# Patient Record
Sex: FEMALE | Race: WHITE | NOT HISPANIC OR LATINO | Employment: OTHER | URBAN - METROPOLITAN AREA
[De-identification: names, ages, dates, MRNs, and addresses within clinical notes are randomized per-mention and may not be internally consistent; named-entity substitution may affect disease eponyms.]

---

## 2017-02-09 ENCOUNTER — TELEPHONE (OUTPATIENT)
Dept: FAMILY MEDICINE CLINIC | Facility: CLINIC | Age: 77
End: 2017-02-09

## 2017-02-09 NOTE — TELEPHONE ENCOUNTER
Told the patient her vitamin D was 23 she needs to be on vitamin D told to go to the drugstore and  some vitamin D 2000 units take 1 a day repeat this vitamin D and about 6 months

## 2017-02-16 ENCOUNTER — TELEPHONE (OUTPATIENT)
Dept: FAMILY MEDICINE CLINIC | Facility: CLINIC | Age: 77
End: 2017-02-16

## 2017-02-16 NOTE — TELEPHONE ENCOUNTER
Selina,     Please call patient she states it's regarding something & did not say.    Contact #[phone].    Thank you.

## 2017-04-25 ENCOUNTER — OFFICE VISIT (OUTPATIENT)
Dept: INTERNAL MEDICINE | Facility: CLINIC | Age: 77
End: 2017-04-25

## 2017-04-25 VITALS
HEIGHT: 63 IN | HEART RATE: 68 BPM | SYSTOLIC BLOOD PRESSURE: 142 MMHG | WEIGHT: 191 LBS | TEMPERATURE: 98.8 F | BODY MASS INDEX: 33.84 KG/M2 | RESPIRATION RATE: 16 BRPM | DIASTOLIC BLOOD PRESSURE: 90 MMHG

## 2017-04-25 DIAGNOSIS — M51.36 DEGENERATION OF INTERVERTEBRAL DISC OF LUMBAR REGION: ICD-10-CM

## 2017-04-25 DIAGNOSIS — L40.9 PSORIASIS OF SCALP: ICD-10-CM

## 2017-04-25 DIAGNOSIS — E55.9 VITAMIN D DEFICIENCY: ICD-10-CM

## 2017-04-25 DIAGNOSIS — R41.3 MEMORY LOSS: ICD-10-CM

## 2017-04-25 DIAGNOSIS — E78.2 MIXED HYPERLIPIDEMIA: Primary | ICD-10-CM

## 2017-04-25 DIAGNOSIS — I10 ESSENTIAL HYPERTENSION: ICD-10-CM

## 2017-04-25 PROCEDURE — 99214 OFFICE O/P EST MOD 30 MIN: CPT | Performed by: FAMILY MEDICINE

## 2017-04-25 RX ORDER — TRAZODONE HYDROCHLORIDE 50 MG/1
50 TABLET ORAL NIGHTLY
Qty: 90 TABLET | Refills: 3 | Status: SHIPPED | OUTPATIENT
Start: 2017-04-25 | End: 2017-07-06

## 2017-04-25 RX ORDER — CALCIUM CARBONATE 200(500)MG
2 TABLET,CHEWABLE ORAL DAILY
COMMUNITY
End: 2017-07-06 | Stop reason: SDUPTHER

## 2017-04-25 RX ORDER — ACETAMINOPHEN 160 MG
2000 TABLET,DISINTEGRATING ORAL
COMMUNITY
End: 2017-07-06

## 2017-04-25 NOTE — PROGRESS NOTES
Subjective   Vikki Robledo is a 76 y.o. female.     Chief Complaint   Patient presents with   • Pain   • Leg Pain   • Vitamin D Deficiency   • Hyperlipidemia   • Memory Loss         History of Present Illness   Patient's transfer patient Dr. Meléndez who is retired.  She has a history of vitamin D deficiency was pretty severe.  She has been going through the trial is taking care of her mother who is .  She is .  She doesn't have children.  Otherwise she has 2 cats.  She has had some scalp psoriasis and sees dermatology.  She's had a weight problem.  Reviewed history of left leg pain and she was thought to have had a side effect from Zetia and she got off that from Dr. Haines cardiology.    We discussed at length memory loss and multiple first-degree relatives with dementia Alzheimer's type.  She didn't sleep very well.      The following portions of the patient's history were reviewed and updated as appropriate: allergies, current medications, past social history and problem list.    Review of Systems   Constitutional: Positive for fatigue.   HENT: Negative.    Eyes: Negative.    Respiratory: Negative.    Cardiovascular: Negative.    Gastrointestinal: Negative.    Endocrine: Negative.    Genitourinary: Negative.    Musculoskeletal: Positive for back pain and myalgias.   Skin: Negative.    Allergic/Immunologic: Negative.    Neurological: Negative.    Hematological: Negative.    Psychiatric/Behavioral: Positive for sleep disturbance.       Objective   Vitals:    17 1417   BP: 142/90   Pulse: 68   Resp: 16   Temp: 98.8 °F (37.1 °C)     Physical Exam   Constitutional: She is oriented to person, place, and time. She appears well-developed and well-nourished.   HENT:   Head: Normocephalic and atraumatic.   Right Ear: Tympanic membrane and external ear normal.   Left Ear: Tympanic membrane and external ear normal.   Nose: Nose normal.   Mouth/Throat: Oropharynx is clear and moist.   Eyes:  Conjunctivae and EOM are normal. Pupils are equal, round, and reactive to light.   Neck: Normal range of motion. Neck supple. No JVD present. No thyromegaly present.   Cardiovascular: Normal rate, regular rhythm, normal heart sounds and intact distal pulses.    Pulmonary/Chest: Effort normal and breath sounds normal.   Abdominal: Soft. Bowel sounds are normal.   Musculoskeletal:        Lumbar back: She exhibits decreased range of motion.   Lymphadenopathy:     She has no cervical adenopathy.   Neurological: She is alert and oriented to person, place, and time. No cranial nerve deficit. Coordination normal.   Skin: Skin is warm and dry. No rash noted.   Psychiatric: She has a normal mood and affect. Her behavior is normal. Judgment and thought content normal.   Vitals reviewed.      Assessment/Plan   Problem List Items Addressed This Visit        Cardiovascular and Mediastinum    Hyperlipidemia - Primary    Relevant Orders    Comprehensive Metabolic Panel    CBC & Differential    Lipid Panel With / Chol / HDL Ratio    Sedimentation Rate    Vitamin D 25 Hydroxy    Vitamin B12    Hypertension    Relevant Orders    Comprehensive Metabolic Panel    CBC & Differential    Lipid Panel With / Chol / HDL Ratio    Sedimentation Rate    Vitamin D 25 Hydroxy    Vitamin B12       Musculoskeletal and Integument    Degeneration of intervertebral disc of lumbar region    Relevant Orders    Comprehensive Metabolic Panel    CBC & Differential    Lipid Panel With / Chol / HDL Ratio    Sedimentation Rate    Vitamin D 25 Hydroxy    Vitamin B12      Other Visit Diagnoses     Psoriasis of scalp        Relevant Orders    Comprehensive Metabolic Panel    CBC & Differential    Lipid Panel With / Chol / HDL Ratio    Sedimentation Rate    Vitamin D 25 Hydroxy    Vitamin B12    Vitamin D deficiency        Relevant Orders    Comprehensive Metabolic Panel    CBC & Differential    Lipid Panel With / Chol / HDL Ratio    Sedimentation Rate    Vitamin  D 25 Hydroxy    Vitamin B12    Memory loss        Relevant Orders    Vitamin B12      Plan: Screening labs recheck vitamin D level.  Trial of trazodone 50 mg at bedtime #30 refill ×2.  Recheck in about 6 weeks.

## 2017-04-26 ENCOUNTER — TELEPHONE (OUTPATIENT)
Dept: INTERNAL MEDICINE | Facility: CLINIC | Age: 77
End: 2017-04-26

## 2017-04-26 LAB
25(OH)D3+25(OH)D2 SERPL-MCNC: 28.8 NG/ML (ref 30–100)
ALBUMIN SERPL-MCNC: 4.2 G/DL (ref 3.5–5.2)
ALBUMIN/GLOB SERPL: 1.7 G/DL
ALP SERPL-CCNC: 63 U/L (ref 39–117)
ALT SERPL-CCNC: 16 U/L (ref 1–33)
AST SERPL-CCNC: 22 U/L (ref 1–32)
BASOPHILS # BLD AUTO: 0.03 10*3/MM3 (ref 0–0.2)
BASOPHILS NFR BLD AUTO: 0.5 % (ref 0–1.5)
BILIRUB SERPL-MCNC: 0.9 MG/DL (ref 0.1–1.2)
BUN SERPL-MCNC: 18 MG/DL (ref 8–23)
BUN/CREAT SERPL: 23.7 (ref 7–25)
CALCIUM SERPL-MCNC: 9.5 MG/DL (ref 8.6–10.5)
CHLORIDE SERPL-SCNC: 99 MMOL/L (ref 98–107)
CHOLEST SERPL-MCNC: 227 MG/DL (ref 0–200)
CHOLEST/HDLC SERPL: 4.37 {RATIO}
CO2 SERPL-SCNC: 32.4 MMOL/L (ref 22–29)
CREAT SERPL-MCNC: 0.76 MG/DL (ref 0.57–1)
EOSINOPHIL # BLD AUTO: 0.04 10*3/MM3 (ref 0–0.7)
EOSINOPHIL NFR BLD AUTO: 0.6 % (ref 0.3–6.2)
ERYTHROCYTE [DISTWIDTH] IN BLOOD BY AUTOMATED COUNT: 13.8 % (ref 11.7–13)
ERYTHROCYTE [SEDIMENTATION RATE] IN BLOOD BY WESTERGREN METHOD: 5 MM/HR (ref 0–30)
GLOBULIN SER CALC-MCNC: 2.5 GM/DL
GLUCOSE SERPL-MCNC: 92 MG/DL (ref 65–99)
HCT VFR BLD AUTO: 47 % (ref 35.6–45.5)
HDLC SERPL-MCNC: 52 MG/DL (ref 40–60)
HGB BLD-MCNC: 16.2 G/DL (ref 11.9–15.5)
IMM GRANULOCYTES # BLD: 0 10*3/MM3 (ref 0–0.03)
IMM GRANULOCYTES NFR BLD: 0 % (ref 0–0.5)
LDLC SERPL CALC-MCNC: 136 MG/DL (ref 0–100)
LYMPHOCYTES # BLD AUTO: 2.51 10*3/MM3 (ref 0.9–4.8)
LYMPHOCYTES NFR BLD AUTO: 38 % (ref 19.6–45.3)
MCH RBC QN AUTO: 30.7 PG (ref 26.9–32)
MCHC RBC AUTO-ENTMCNC: 34.5 G/DL (ref 32.4–36.3)
MCV RBC AUTO: 89 FL (ref 80.5–98.2)
MONOCYTES # BLD AUTO: 0.57 10*3/MM3 (ref 0.2–1.2)
MONOCYTES NFR BLD AUTO: 8.6 % (ref 5–12)
NEUTROPHILS # BLD AUTO: 3.46 10*3/MM3 (ref 1.9–8.1)
NEUTROPHILS NFR BLD AUTO: 52.3 % (ref 42.7–76)
PLATELET # BLD AUTO: 158 10*3/MM3 (ref 140–500)
POTASSIUM SERPL-SCNC: 4 MMOL/L (ref 3.5–5.2)
PROT SERPL-MCNC: 6.7 G/DL (ref 6–8.5)
RBC # BLD AUTO: 5.28 10*6/MM3 (ref 3.9–5.2)
SODIUM SERPL-SCNC: 143 MMOL/L (ref 136–145)
TRIGL SERPL-MCNC: 195 MG/DL (ref 0–150)
VLDLC SERPL CALC-MCNC: 39 MG/DL (ref 5–40)
WBC # BLD AUTO: 6.61 10*3/MM3 (ref 4.5–10.7)

## 2017-07-06 ENCOUNTER — OFFICE VISIT (OUTPATIENT)
Dept: INTERNAL MEDICINE | Facility: CLINIC | Age: 77
End: 2017-07-06

## 2017-07-06 VITALS
OXYGEN SATURATION: 98 % | SYSTOLIC BLOOD PRESSURE: 138 MMHG | WEIGHT: 181 LBS | BODY MASS INDEX: 32.07 KG/M2 | HEART RATE: 56 BPM | DIASTOLIC BLOOD PRESSURE: 84 MMHG | HEIGHT: 63 IN

## 2017-07-06 DIAGNOSIS — E55.9 VITAMIN D DEFICIENCY: ICD-10-CM

## 2017-07-06 DIAGNOSIS — I10 ESSENTIAL HYPERTENSION: ICD-10-CM

## 2017-07-06 DIAGNOSIS — E78.2 MIXED HYPERLIPIDEMIA: Primary | ICD-10-CM

## 2017-07-06 DIAGNOSIS — F51.02 TRANSIENT INSOMNIA: ICD-10-CM

## 2017-07-06 PROCEDURE — 99214 OFFICE O/P EST MOD 30 MIN: CPT | Performed by: FAMILY MEDICINE

## 2017-07-06 RX ORDER — METHENAMINE, SODIUM PHOSPHATE, MONOBASIC, MONOHYDRATE, PHENYL SALICYLATE, METHYLENE BLUE, AND HYOSCYAMINE SULFATE 120; 40.8; 36; 10; .12 MG/1; MG/1; MG/1; MG/1; MG/1
CAPSULE ORAL
Refills: 0 | COMMUNITY
Start: 2017-06-04 | End: 2018-07-10 | Stop reason: SDUPTHER

## 2017-07-06 RX ORDER — CALCIUM CARBONATE 200(500)MG
1 TABLET,CHEWABLE ORAL
COMMUNITY
End: 2018-07-10

## 2017-07-06 RX ORDER — VALSARTAN AND HYDROCHLOROTHIAZIDE 160; 25 MG/1; MG/1
1 TABLET ORAL DAILY
Qty: 90 TABLET | Refills: 3 | Status: SHIPPED | OUTPATIENT
Start: 2017-07-06 | End: 2018-07-14 | Stop reason: SDUPTHER

## 2017-07-06 RX ORDER — MULTIVITAMIN
TABLET ORAL
COMMUNITY
End: 2019-08-22

## 2017-07-06 NOTE — PROGRESS NOTES
Subjective   Vikki Robledo is a 77 y.o. female.     Chief Complaint   Patient presents with   • Follow-up   Patient is here for follow-up of history of hyperlipidemia as well as psoriasis on the scalp hypertension transient insomnia and concern about family history of Alzheimer's disease as well as vitamin D deficiency.  History of Present Illness   Patient is here and is done well as far as losing weight and is improvement in her cholesterol numbers from testing done at [facility].  Cholesterol is 163 HDL 39 Aren shows 209 she's lost 61 pounds since she was in her 60s.  Her mom had Alzheimer's disease history.    She is treated for chronic scalp psoriasis as well.  She sees Dr. Costello dermatology.    The patient takes Zetia now about 3 or 4 times a week.  Otherwise Dee Dee of hypertension is reviewed.      The following portions of the patient's history were reviewed and updated as appropriate: allergies, current medications, past social history and problem list.    Review of Systems   Constitutional: Negative.    HENT: Negative.    Eyes: Negative.    Respiratory: Negative.    Cardiovascular: Negative.    Gastrointestinal: Negative.    Endocrine: Negative.    Genitourinary: Negative.    Musculoskeletal: Negative.    Skin: Negative.    Allergic/Immunologic: Negative.    Neurological: Negative.    Hematological: Negative.    Psychiatric/Behavioral: Negative.        Objective   Vitals:    07/06/17 1522   BP: 138/84   Pulse: 56   SpO2: 98%     Physical Exam   Constitutional: She is oriented to person, place, and time. She appears well-developed and well-nourished.   HENT:   Head: Normocephalic and atraumatic.   Right Ear: Tympanic membrane and external ear normal.   Left Ear: Tympanic membrane and external ear normal.   Nose: Nose normal.   Mouth/Throat: Oropharynx is clear and moist.   Eyes: Conjunctivae and EOM are normal. Pupils are equal, round, and reactive to light.   Neck: Normal range  of motion. Neck supple. No JVD present. No thyromegaly present.   Cardiovascular: Normal rate, regular rhythm, normal heart sounds and intact distal pulses.    Pulmonary/Chest: Effort normal and breath sounds normal.   Abdominal: Soft. Bowel sounds are normal.   Musculoskeletal:        Lumbar back: She exhibits decreased range of motion.   Lymphadenopathy:     She has no cervical adenopathy.   Neurological: She is alert and oriented to person, place, and time. No cranial nerve deficit. Coordination normal.   Skin: Skin is warm and dry. No rash noted.   Psychiatric: She has a normal mood and affect. Her behavior is normal. Judgment and thought content normal.   Vitals reviewed.      Assessment/Plan   Problem List Items Addressed This Visit        Cardiovascular and Mediastinum    Hyperlipidemia - Primary    Hypertension       Other    Transient insomnia      Other Visit Diagnoses     Vitamin D deficiency          Plan: Return visit in about 4 months with recheck of labs at that time including vitamin D.  Otherwise continue current treatment of hypertension.  She has some trazodone 50 mg at bedtime when necessary which she has not taken that she wants to research the medicine further.  I will advise probiotic in reference to her immune system and psoriasis

## 2017-12-10 ENCOUNTER — APPOINTMENT (OUTPATIENT)
Dept: CT IMAGING | Facility: HOSPITAL | Age: 77
End: 2017-12-10

## 2017-12-10 ENCOUNTER — HOSPITAL ENCOUNTER (EMERGENCY)
Facility: HOSPITAL | Age: 77
Discharge: HOME | End: 2017-12-10
Attending: EMERGENCY MEDICINE | Admitting: EMERGENCY MEDICINE

## 2017-12-10 VITALS
HEIGHT: 63 IN | RESPIRATION RATE: 18 BRPM | OXYGEN SATURATION: 98 % | WEIGHT: 180 LBS | HEART RATE: 65 BPM | SYSTOLIC BLOOD PRESSURE: 177 MMHG | DIASTOLIC BLOOD PRESSURE: 71 MMHG | BODY MASS INDEX: 31.89 KG/M2 | TEMPERATURE: 98.2 F

## 2017-12-10 DIAGNOSIS — R42 DIZZINESS: Primary | ICD-10-CM

## 2017-12-10 LAB
ALBUMIN SERPL-MCNC: 3.6 G/DL (ref 3.5–5.2)
ALBUMIN/GLOB SERPL: 1.1 G/DL
ALP SERPL-CCNC: 62 U/L (ref 39–117)
ALT SERPL W P-5'-P-CCNC: 16 U/L (ref 1–33)
ANION GAP SERPL CALCULATED.3IONS-SCNC: 9 MMOL/L
AST SERPL-CCNC: 22 U/L (ref 1–32)
BASOPHILS # BLD AUTO: 0.03 10*3/MM3 (ref 0–0.2)
BASOPHILS NFR BLD AUTO: 0.6 % (ref 0–1.5)
BILIRUB SERPL-MCNC: 0.8 MG/DL (ref 0.1–1.2)
BUN BLD-MCNC: 12 MG/DL (ref 8–23)
BUN/CREAT SERPL: 15.6 (ref 7–25)
CALCIUM SPEC-SCNC: 9.1 MG/DL (ref 8.6–10.5)
CHLORIDE SERPL-SCNC: 104 MMOL/L (ref 98–107)
CO2 SERPL-SCNC: 31 MMOL/L (ref 22–29)
CREAT BLD-MCNC: 0.77 MG/DL (ref 0.57–1)
DEPRECATED RDW RBC AUTO: 43.3 FL (ref 37–54)
EOSINOPHIL # BLD AUTO: 0.02 10*3/MM3 (ref 0–0.7)
EOSINOPHIL NFR BLD AUTO: 0.4 % (ref 0.3–6.2)
ERYTHROCYTE [DISTWIDTH] IN BLOOD BY AUTOMATED COUNT: 13.1 % (ref 11.7–13)
GFR SERPL CREATININE-BSD FRML MDRD: 73 ML/MIN/1.73
GLOBULIN UR ELPH-MCNC: 3.2 GM/DL
GLUCOSE BLD-MCNC: 100 MG/DL (ref 65–99)
HCT VFR BLD AUTO: 46.8 % (ref 35.6–45.5)
HGB BLD-MCNC: 15.3 G/DL (ref 11.9–15.5)
HOLD SPECIMEN: NORMAL
IMM GRANULOCYTES # BLD: 0 10*3/MM3 (ref 0–0.03)
IMM GRANULOCYTES NFR BLD: 0 % (ref 0–0.5)
LYMPHOCYTES # BLD AUTO: 1.65 10*3/MM3 (ref 0.9–4.8)
LYMPHOCYTES NFR BLD AUTO: 30.4 % (ref 19.6–45.3)
MCH RBC QN AUTO: 29.8 PG (ref 26.9–32)
MCHC RBC AUTO-ENTMCNC: 32.7 G/DL (ref 32.4–36.3)
MCV RBC AUTO: 91.1 FL (ref 80.5–98.2)
MONOCYTES # BLD AUTO: 0.34 10*3/MM3 (ref 0.2–1.2)
MONOCYTES NFR BLD AUTO: 6.3 % (ref 5–12)
NEUTROPHILS # BLD AUTO: 3.38 10*3/MM3 (ref 1.9–8.1)
NEUTROPHILS NFR BLD AUTO: 62.3 % (ref 42.7–76)
PLATELET # BLD AUTO: 135 10*3/MM3 (ref 140–500)
PMV BLD AUTO: 11.3 FL (ref 6–12)
POTASSIUM BLD-SCNC: 3.8 MMOL/L (ref 3.5–5.2)
PROT SERPL-MCNC: 6.8 G/DL (ref 6–8.5)
RBC # BLD AUTO: 5.14 10*6/MM3 (ref 3.9–5.2)
SODIUM BLD-SCNC: 144 MMOL/L (ref 136–145)
WBC NRBC COR # BLD: 5.42 10*3/MM3 (ref 4.5–10.7)
WHOLE BLOOD HOLD SPECIMEN: NORMAL

## 2017-12-10 PROCEDURE — 93010 ELECTROCARDIOGRAM REPORT: CPT | Performed by: INTERNAL MEDICINE

## 2017-12-10 PROCEDURE — 85025 COMPLETE CBC W/AUTO DIFF WBC: CPT | Performed by: EMERGENCY MEDICINE

## 2017-12-10 PROCEDURE — 80053 COMPREHEN METABOLIC PANEL: CPT | Performed by: EMERGENCY MEDICINE

## 2017-12-10 PROCEDURE — 99284 EMERGENCY DEPT VISIT MOD MDM: CPT

## 2017-12-10 PROCEDURE — 70450 CT HEAD/BRAIN W/O DYE: CPT

## 2017-12-10 PROCEDURE — 93005 ELECTROCARDIOGRAM TRACING: CPT | Performed by: EMERGENCY MEDICINE

## 2017-12-10 RX ORDER — SODIUM CHLORIDE 0.9 % (FLUSH) 0.9 %
10 SYRINGE (ML) INJECTION AS NEEDED
Status: DISCONTINUED | OUTPATIENT
Start: 2017-12-10 | End: 2017-12-10 | Stop reason: HOSPADM

## 2017-12-10 NOTE — ED PROVIDER NOTES
EMERGENCY DEPARTMENT ENCOUNTER    CHIEF COMPLAINT  Chief Complaint: generalized weakness    History given by: Pt  History limited by: N/A  Room Number: 14/14  PMD: Jewel Arteaga Jr., MD      HPI:  Pt is a 77 y.o. female who presents via EMS for generalized weakness with an unsteady gait that began last night. She began to feel lightheaded, and experienced difficulty ambulating around the house yesterday night due to her unsteadiness. Pt woke up this morning and was still unsteady on her feet, and decided to come to the ED. No LOC, a headache, nausea, vomiting, diarrhea, urinary symptoms, palpitations, or any numbness or tingling. Pt states she called (PCP) this morning, who told her to come to the ED to get checked out.     Duration:  Since last night   Onset: gradual  Timing: constant  Intensity/Severity: moderate  Progression: unchanged   Associated Symptoms: lightheadedness   Aggravating Factors: none  Alleviating Factors: none  Previous Episodes: No history of a similar episode.   Treatment before arrival: No treatment PTA.     PAST MEDICAL HISTORY  Active Ambulatory Problems     Diagnosis Date Noted   • Degeneration of intervertebral disc of lumbar region 03/24/2016   • Hyperlipidemia 03/24/2016   • Hypertension 03/24/2016   • Stenosis of carotid artery 03/24/2016   • Pain in joint 09/23/2016   • Carotid stenosis 09/23/2016   • Muscle spasm 09/23/2016   • Transient insomnia 09/23/2016     Resolved Ambulatory Problems     Diagnosis Date Noted   • No Resolved Ambulatory Problems     Past Medical History:   Diagnosis Date   • History of breast surgery    • Hypertension        PAST SURGICAL HISTORY  Past Surgical History:   Procedure Laterality Date   • BREAST SURGERY      aspriaton of cyst x3   • CHOLECYSTECTOMY  2007   • HERNIA REPAIR      x2   • TONSILLECTOMY     • UTERINE FIBROID EMBOLIZATION  1970       FAMILY HISTORY  Family History   Problem Relation Age of Onset   • Alzheimer's disease Other    •  Pancreatic cancer Other        SOCIAL HISTORY  Social History     Social History   • Marital status:      Spouse name: N/A   • Number of children: N/A   • Years of education: N/A     Occupational History   • Retired/teacher       Social History Main Topics   • Smoking status: Former Smoker     Types: Cigarettes   • Smokeless tobacco: Not on file   • Alcohol use Yes      Comment: One or more times monthly    • Drug use: Not on file   • Sexual activity: Not on file     Other Topics Concern   • Not on file     Social History Narrative       ALLERGIES  Amoxicillin; Penicillins; and Statins    REVIEW OF SYSTEMS  Review of Systems   Constitutional: Negative for fever.   HENT: Negative for sore throat.    Eyes: Negative.    Respiratory: Negative for cough and shortness of breath.    Cardiovascular: Negative for chest pain.   Gastrointestinal: Negative for abdominal pain, diarrhea and vomiting.   Genitourinary: Negative for dysuria.   Musculoskeletal: Positive for gait problem. Negative for neck pain.   Skin: Negative for rash.   Allergic/Immunologic: Negative.    Neurological: Positive for weakness (generalized ) and light-headedness. Negative for numbness and headaches.   Hematological: Negative.    Psychiatric/Behavioral: Negative.    All other systems reviewed and are negative.      PHYSICAL EXAM  ED Triage Vitals   Temp Heart Rate Resp BP SpO2   12/10/17 1120 12/10/17 1120 12/10/17 1120 12/10/17 1120 12/10/17 1120   98.2 °F (36.8 °C) 60 16 132/67 98 %      Temp src Heart Rate Source Patient Position BP Location FiO2 (%)   12/10/17 1120 12/10/17 1120 12/10/17 1120 12/10/17 1120 --   Tympanic Monitor Lying Right arm        Physical Exam   Constitutional: She is oriented to person, place, and time and well-developed, well-nourished, and in no distress. No distress.   HENT:   Head: Normocephalic and atraumatic.   Eyes: EOM are normal. Pupils are equal, round, and reactive to light.   Neck: Normal range of motion.  Neck supple.   Cardiovascular: Normal rate, regular rhythm and normal heart sounds.    Pulmonary/Chest: Effort normal and breath sounds normal. No respiratory distress.   Abdominal: Soft. There is no tenderness. There is no rebound and no guarding.   Musculoskeletal: Normal range of motion. She exhibits no edema.   Neurological: She is alert and oriented to person, place, and time. She has normal sensation and normal strength.   Skin: Skin is warm and dry. No rash noted.   Psychiatric: Mood and affect normal.   Nursing note and vitals reviewed.      LAB RESULTS  Lab Results (last 24 hours)     Procedure Component Value Units Date/Time    CBC & Differential [[id number]] Collected:  12/10/17 1210    Specimen:  Blood Updated:  12/10/17 1226    Narrative:       The following orders were created for panel order CBC & Differential.  Procedure                               Abnormality         Status                     ---------                               -----------         ------                     CBC Auto Differential[[id number]]        Abnormal            Final result                 Please view results for these tests on the individual orders.    Comprehensive Metabolic Panel [[id number]]  (Abnormal) Collected:  12/10/17 1210    Specimen:  Blood Updated:  12/10/17 1248     Glucose 100 (H) mg/dL      BUN 12 mg/dL      Creatinine 0.77 mg/dL      Sodium 144 mmol/L      Potassium 3.8 mmol/L      Chloride 104 mmol/L      CO2 31.0 (H) mmol/L      Calcium 9.1 mg/dL      Total Protein 6.8 g/dL      Albumin 3.60 g/dL      ALT (SGPT) 16 U/L      AST (SGOT) 22 U/L      Alkaline Phosphatase 62 U/L      Total Bilirubin 0.8 mg/dL      eGFR Non African Amer 73 mL/min/1.73      Globulin 3.2 gm/dL      A/G Ratio 1.1 g/dL      BUN/Creatinine Ratio 15.6     Anion Gap 9.0 mmol/L     Narrative:       The MDRD GFR formula is only valid for adults with stable renal function between ages 18 and 70.    CBC Auto Differential [[id number]]   (Abnormal) Collected:  12/10/17 1210    Specimen:  Blood Updated:  12/10/17 1226     WBC 5.42 10*3/mm3      RBC 5.14 10*6/mm3      Hemoglobin 15.3 g/dL      Hematocrit 46.8 (H) %      MCV 91.1 fL      MCH 29.8 pg      MCHC 32.7 g/dL      RDW 13.1 (H) %      RDW-SD 43.3 fl      MPV 11.3 fL      Platelets 135 (L) 10*3/mm3      Neutrophil % 62.3 %      Lymphocyte % 30.4 %      Monocyte % 6.3 %      Eosinophil % 0.4 %      Basophil % 0.6 %      Immature Grans % 0.0 %      Neutrophils, Absolute 3.38 10*3/mm3      Lymphocytes, Absolute 1.65 10*3/mm3      Monocytes, Absolute 0.34 10*3/mm3      Eosinophils, Absolute 0.02 10*3/mm3      Basophils, Absolute 0.03 10*3/mm3      Immature Grans, Absolute 0.00 10*3/mm3           I ordered the above labs and reviewed the results    RADIOLOGY  CT Head Without Contrast   Preliminary Result   Very minimal small vessel disease in the periventricular   white matter of the cerebral hemispheres. Otherwise normal head CT and   the etiology of the dizziness, off balance sensation, and weakness is   not established on this exam.       Radiation dose reduction techniques were utilized, including automated   exposure control and exposure modulation based on body size.                   I ordered the above noted radiological studies. Interpreted by radiologist. Reviewed by me in PACS.       PROCEDURES  Procedures  EKG           EKG time: 1210  Rhythm/Rate: NSR, 60 BPM   P waves and MI: normal  QRS, axis: normal   ST and T waves: normal     Interpreted Contemporaneously by me, independently viewed  Unchanged compared to prior 12/10/16      PROGRESS AND CONSULTS  ED Course     1154 Ordered blood work including CBC and CMP, EKG, and CT head for further evaluation. Ordered IVF for hydration.     1417 Rechecked pt who is resting comfortably. Advised pt the CT head and lab work up was negative, and plan to discharge home at this time. Pt understands and agrees with plan. All questions addressed.      MEDICAL DECISION MAKING  Results were reviewed/discussed with the patient and they were also made aware of online access. Pt also made aware that some labs, such as cultures, will not be resulted during ER visit and follow up with PMD is necessary.     MDM  Number of Diagnoses or Management Options     Amount and/or Complexity of Data Reviewed  Clinical lab tests: ordered and reviewed (Lab work up is negative. )  Tests in the radiology section of CPT®: reviewed and ordered (CT head- negative. )  Tests in the medicine section of CPT®: ordered and reviewed (See note. )  Independent visualization of images, tracings, or specimens: yes    Patient Progress  Patient progress: stable         DIAGNOSIS  Final diagnoses:   Dizziness       DISPOSITION  DISCHARGE    Patient discharged in stable condition.    Reviewed implications of results, diagnosis, meds, responsibility to follow up, warning signs and symptoms of possible worsening, potential complications and reasons to return to ER.     Patient/Family voiced understanding of above instructions.    Discussed plan for discharge, as there is no emergent indication for admission.  Pt/family is agreeable and understands need for follow up and repeat testing.  Pt is aware that discharge does not mean that nothing is wrong but it indicates no emergency is present that requires admission and they must continue care with follow-up as given below or physician of their choice.     FOLLOW-UP  Jewel Arteaga Jr., MD  [address] [address] [phone]    Schedule an appointment as soon as possible for a visit      [facility] Emergency Department  [address] [facility] [address] [phone]    If symptoms worsen         Medication List      Notice     No changes were made to your prescriptions during this visit.            Latest Documented Vital Signs:  As of 2:19 PM  BP- 177/71 HR- 58 Temp- 98.2 °F (36.8 °C) (Tympanic) O2  sat- 100%    --  Documentation assistance provided by asha Buitrago for Dr.William Brayden MD.  Information recorded by the scribe was done at my direction and has been verified and validated by me.     Joana Coon  12/10/17 1235          Joana Coon  12/10/17 1420       Yonas Owen MD  12/10/17 [id number]

## 2017-12-12 ENCOUNTER — TELEPHONE (OUTPATIENT)
Dept: SOCIAL WORK | Facility: HOSPITAL | Age: 77
End: 2017-12-12

## 2017-12-12 NOTE — TELEPHONE ENCOUNTER
ED f/u phone call: states that she feels better, and has f/u mike't w/ PCP tomorrow. No questions/concerns

## 2017-12-13 ENCOUNTER — OFFICE VISIT (OUTPATIENT)
Dept: INTERNAL MEDICINE | Facility: CLINIC | Age: 77
End: 2017-12-13

## 2017-12-13 VITALS
DIASTOLIC BLOOD PRESSURE: 72 MMHG | SYSTOLIC BLOOD PRESSURE: 136 MMHG | OXYGEN SATURATION: 94 % | TEMPERATURE: 97.8 F | BODY MASS INDEX: 32.59 KG/M2 | HEART RATE: 92 BPM | WEIGHT: 184 LBS

## 2017-12-13 DIAGNOSIS — L40.9 PSORIASIS OF SCALP: ICD-10-CM

## 2017-12-13 DIAGNOSIS — I10 ESSENTIAL HYPERTENSION: ICD-10-CM

## 2017-12-13 DIAGNOSIS — R42 VERTIGO: Primary | ICD-10-CM

## 2017-12-13 DIAGNOSIS — F51.01 PRIMARY INSOMNIA: ICD-10-CM

## 2017-12-13 DIAGNOSIS — E55.9 VITAMIN D DEFICIENCY: ICD-10-CM

## 2017-12-13 DIAGNOSIS — R68.2 DRY MOUTH: ICD-10-CM

## 2017-12-13 PROCEDURE — 99214 OFFICE O/P EST MOD 30 MIN: CPT | Performed by: FAMILY MEDICINE

## 2017-12-13 RX ORDER — ERGOCALCIFEROL 1.25 MG/1
50000 CAPSULE ORAL
Qty: 8 CAPSULE | Refills: 1 | Status: SHIPPED | OUTPATIENT
Start: 2017-12-13 | End: 2018-07-10

## 2017-12-13 RX ORDER — MECLIZINE HCL 12.5 MG/1
12.5 TABLET ORAL 3 TIMES DAILY PRN
Qty: 30 TABLET | Refills: 1 | Status: SHIPPED | OUTPATIENT
Start: 2017-12-13 | End: 2018-01-04 | Stop reason: SDUPTHER

## 2017-12-13 NOTE — PROGRESS NOTES
Subjective   Vikki Robledo is a 77 y.o. female.     Chief Complaint   Patient presents with   • Rash   • Dizziness   Emergency room follow-up.      History of Present Illness   Patient is here with the for ER follow-up with a history of transient vertigo which resolved.  CT of the head is within normal limits in labs look pretty normal as well.  She has had a history of vitamin D deficiency is reviewed with her.    Otherwise she will hyperlipidemia is reviewed as far as also monitoring blood pressure.  She has been hesitant to take medications.  She wants to do natural products.    She describes possibly using melatonin I told her that would be okay to drink some coffee in the daytime and a moderate level and take melatonin 3 mg at night if she desires.    She's had an intermittent dry mouth.    She has scalp psoriasis and would like to have a second opinion.  We'll get her set up for another dermatologist Dr. Ca.      The following portions of the patient's history were reviewed and updated as appropriate: allergies, current medications, past social history and problem list.    Review of Systems   Constitutional: Negative.    HENT: Negative.    Eyes: Negative.    Respiratory: Negative.    Cardiovascular: Negative.    Gastrointestinal: Negative.    Endocrine: Negative.    Genitourinary: Negative.    Musculoskeletal: Negative.    Skin: Positive for rash.   Allergic/Immunologic: Negative.    Neurological: Positive for dizziness.   Hematological: Negative.    Psychiatric/Behavioral: Negative.        Objective   Vitals:    12/13/17 0949   BP: 136/72   Pulse: 92   Temp: 97.8 °F (36.6 °C)   SpO2: 94%     Physical Exam   Constitutional: She is oriented to person, place, and time. She appears well-developed and well-nourished.   HENT:   Head: Normocephalic and atraumatic.   Right Ear: Tympanic membrane and external ear normal.   Left Ear: Tympanic membrane and external ear normal.   Nose: Nose normal.    Mouth/Throat: Oropharynx is clear and moist.   Eyes: Conjunctivae and EOM are normal. Pupils are equal, round, and reactive to light.   Neck: Normal range of motion. Neck supple. No JVD present. No thyromegaly present.   Cardiovascular: Normal rate, regular rhythm, normal heart sounds and intact distal pulses.    Pulmonary/Chest: Effort normal and breath sounds normal.   Abdominal: Soft. Bowel sounds are normal.   Musculoskeletal: Normal range of motion.   Lymphadenopathy:     She has no cervical adenopathy.   Neurological: She is alert and oriented to person, place, and time. No cranial nerve deficit. Coordination normal.   Skin: Skin is warm and dry. No rash noted.        Psychiatric: She has a normal mood and affect. Her behavior is normal. Judgment and thought content normal.   Vitals reviewed.      Assessment/Plan   Problem List Items Addressed This Visit        Cardiovascular and Mediastinum    Hypertension      Other Visit Diagnoses     Vertigo    -  Primary    Psoriasis of scalp        Relevant Orders    Ambulatory Referral to Dermatology    Primary insomnia        Dry mouth        Vitamin D deficiency          Plan: Return for wellness visit in May.  Referral to Dr. Bethel Ca for dermatology for the scalp.  Meclizine 2.5 3 times a day when necessary onset of dizziness.  Further workup for vertigo.  Returns.  Vitamin D 50,000 units weekly ×8 weeks.

## 2018-01-04 RX ORDER — MECLIZINE HCL 12.5 MG/1
TABLET ORAL
Qty: 30 TABLET | Refills: 1 | Status: SHIPPED | OUTPATIENT
Start: 2018-01-04 | End: 2019-09-09 | Stop reason: SDUPTHER

## 2018-03-02 ENCOUNTER — OFFICE VISIT (OUTPATIENT)
Dept: INTERNAL MEDICINE | Facility: CLINIC | Age: 78
End: 2018-03-02

## 2018-03-02 VITALS
BODY MASS INDEX: 32.24 KG/M2 | DIASTOLIC BLOOD PRESSURE: 64 MMHG | OXYGEN SATURATION: 98 % | TEMPERATURE: 98.4 F | SYSTOLIC BLOOD PRESSURE: 112 MMHG | HEART RATE: 92 BPM | WEIGHT: 182 LBS

## 2018-03-02 DIAGNOSIS — M79.605 BILATERAL LEG PAIN: ICD-10-CM

## 2018-03-02 DIAGNOSIS — M79.604 BILATERAL LEG PAIN: ICD-10-CM

## 2018-03-02 DIAGNOSIS — R10.31 RIGHT LOWER QUADRANT PAIN: Primary | ICD-10-CM

## 2018-03-02 DIAGNOSIS — I10 ESSENTIAL HYPERTENSION: ICD-10-CM

## 2018-03-02 DIAGNOSIS — R42 DIZZINESS: ICD-10-CM

## 2018-03-02 PROCEDURE — 99214 OFFICE O/P EST MOD 30 MIN: CPT | Performed by: FAMILY MEDICINE

## 2018-03-02 NOTE — PROGRESS NOTES
Subjective   Vikki Robledo is a 77 y.o. female.     Chief Complaint   Patient presents with   • Abdominal Pain   • GI Problem   • Hypertension     Dizziness    History of Present Illness   Vikki comes in with a number of issues.  She has periodic positional dizziness which is been going on for greater than 2 months now.  At one point she also had both legs aching after taking a nap this is preceded by eating a good deal of candy and chocolates.  She states she doesn't usually do this.  Her previous labs are reviewed.    Otherwise she's had a recurrent positional right side pain and has had a prior abdominal hernia repair.      The following portions of the patient's history were reviewed and updated as appropriate: allergies, current medications, past social history and problem list.    Review of Systems   Constitutional: Negative.    HENT: Negative.    Eyes: Negative.    Respiratory: Negative.    Cardiovascular: Negative.    Gastrointestinal: Positive for abdominal pain.   Endocrine: Negative.    Genitourinary: Negative.    Musculoskeletal: Negative.    Skin: Negative.    Allergic/Immunologic: Negative.    Neurological: Positive for dizziness.   Hematological: Negative.    Psychiatric/Behavioral: Negative.        Objective   Vitals:    03/02/18 1448   BP: 112/64   Pulse: 92   Temp: 98.4 °F (36.9 °C)   SpO2: 98%     Physical Exam   Constitutional: She is oriented to person, place, and time. She appears well-developed and well-nourished.   HENT:   Head: Normocephalic and atraumatic.   Right Ear: Tympanic membrane and external ear normal.   Left Ear: Tympanic membrane and external ear normal.   Nose: Nose normal.   Mouth/Throat: Oropharynx is clear and moist.   Eyes: Conjunctivae and EOM are normal. Pupils are equal, round, and reactive to light.   Neck: Normal range of motion. Neck supple. No JVD present. No thyromegaly present.   Cardiovascular: Normal rate, regular rhythm, normal heart sounds and intact  distal pulses.    Pulmonary/Chest: Effort normal and breath sounds normal.   Abdominal: Soft. Bowel sounds are normal.       Musculoskeletal: Normal range of motion.   Lymphadenopathy:     She has no cervical adenopathy.   Neurological: She is alert and oriented to person, place, and time. No cranial nerve deficit. Coordination normal.   Skin: Skin is warm and dry. No rash noted.   Psychiatric: She has a normal mood and affect. Her behavior is normal. Judgment and thought content normal.   Vitals reviewed.      Assessment/Plan   Problem List Items Addressed This Visit        Cardiovascular and Mediastinum    Hypertension      Other Visit Diagnoses     Right lower quadrant pain    -  Primary    Relevant Orders    Ambulatory Referral to General Surgery    Dizziness        Relevant Orders    Ambulatory Referral to ENT (Otolaryngology)    Bilateral leg pain            Plan: Referral back to general surgery Dr. Gonsalez and also referral to ENT advanced allergy Dr. Rios for vertigo evaluation.  We'll see her back in May.  Continue current treatment for hypertension.

## 2018-04-30 ENCOUNTER — OFFICE VISIT (OUTPATIENT)
Dept: INTERNAL MEDICINE | Facility: CLINIC | Age: 78
End: 2018-04-30

## 2018-04-30 VITALS
DIASTOLIC BLOOD PRESSURE: 64 MMHG | BODY MASS INDEX: 32.77 KG/M2 | TEMPERATURE: 98.5 F | OXYGEN SATURATION: 98 % | WEIGHT: 185 LBS | SYSTOLIC BLOOD PRESSURE: 118 MMHG | HEART RATE: 73 BPM

## 2018-04-30 DIAGNOSIS — T31.10 BURN (ANY DEGREE) INVOLVING 10-19% OF BODY SURFACE: Primary | ICD-10-CM

## 2018-04-30 DIAGNOSIS — E78.2 MIXED HYPERLIPIDEMIA: ICD-10-CM

## 2018-04-30 DIAGNOSIS — I10 ESSENTIAL HYPERTENSION: ICD-10-CM

## 2018-04-30 PROCEDURE — 99213 OFFICE O/P EST LOW 20 MIN: CPT | Performed by: FAMILY MEDICINE
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
## 2018-07-10 ENCOUNTER — OFFICE VISIT (OUTPATIENT)
Dept: INTERNAL MEDICINE | Facility: CLINIC | Age: 78
End: 2018-07-10

## 2018-07-10 VITALS
OXYGEN SATURATION: 97 % | BODY MASS INDEX: 32.24 KG/M2 | TEMPERATURE: 97.8 F | WEIGHT: 182 LBS | SYSTOLIC BLOOD PRESSURE: 132 MMHG | DIASTOLIC BLOOD PRESSURE: 64 MMHG | HEART RATE: 85 BPM

## 2018-07-10 DIAGNOSIS — E55.9 VITAMIN D DEFICIENCY: ICD-10-CM

## 2018-07-10 DIAGNOSIS — L40.9 PSORIASIS: ICD-10-CM

## 2018-07-10 DIAGNOSIS — M15.2 BOUCHARD'S NODE: ICD-10-CM

## 2018-07-10 DIAGNOSIS — L40.50 PSORIATIC ARTHRITIS (HCC): ICD-10-CM

## 2018-07-10 DIAGNOSIS — E78.2 MIXED HYPERLIPIDEMIA: ICD-10-CM

## 2018-07-10 DIAGNOSIS — Z00.00 MEDICARE ANNUAL WELLNESS VISIT, SUBSEQUENT: Primary | ICD-10-CM

## 2018-07-10 DIAGNOSIS — I10 ESSENTIAL HYPERTENSION: ICD-10-CM

## 2018-07-10 PROCEDURE — 99214 OFFICE O/P EST MOD 30 MIN: CPT | Performed by: FAMILY MEDICINE

## 2018-07-10 PROCEDURE — G0439 PPPS, SUBSEQ VISIT: HCPCS | Performed by: FAMILY MEDICINE

## 2018-07-10 RX ORDER — TRIAMCINOLONE ACETONIDE 1 MG/G
CREAM TOPICAL
COMMUNITY
Start: 2018-06-29 | End: 2019-02-11

## 2018-07-10 RX ORDER — TRIAMCINOLONE ACETONIDE 1 MG/G
CREAM TOPICAL
Refills: 0 | COMMUNITY
Start: 2018-06-29 | End: 2018-07-10 | Stop reason: SDUPTHER

## 2018-07-10 RX ORDER — CICLOPIROX 1 G/100ML
SHAMPOO TOPICAL
Refills: 0 | COMMUNITY
Start: 2018-07-06

## 2018-07-10 NOTE — PROGRESS NOTES
Subjective   Vikki Robledo is a 78 y.o. female.     Chief Complaint   Patient presents with   • Annual Exam   • Osteoarthritis   • Hyperlipidemia   • Hypertension         History of Present Illness   Delightful lady here with history of diagnosed psoriasis treated by Dr. Ca dermatology and also Medicare wellness visit.  She goes to the cholesterol clinic as noted been pretty stable in cholesterol.  She has developed arthritis in her hands and she has a Ludy's node fourth finger right hand the question is whether or not she has psoriatic arthritis.  We discussed seeing a rheumatologist.    Previously she had right upper quadrant discomfort and saw Dr. Perry Gonsalez surgery who did a CT of the abdomen could not find an exact reason for this.    She has had vitamin D deficiency in the past.      The following portions of the patient's history were reviewed and updated as appropriate: allergies, current medications, past social history and problem list.    Review of Systems   Constitutional: Negative.    HENT: Negative.    Eyes: Negative.    Respiratory: Negative.    Cardiovascular: Negative.    Gastrointestinal: Negative.    Endocrine: Negative.    Genitourinary: Negative.    Musculoskeletal: Positive for arthralgias.   Skin: Positive for rash.   Allergic/Immunologic: Negative.    Neurological: Negative.    Hematological: Negative.    Psychiatric/Behavioral: Negative.        Objective   Vitals:    07/10/18 1341   BP: 132/64   Pulse: 85   Temp: 97.8 °F (36.6 °C)   SpO2: 97%     Physical Exam   Constitutional: She is oriented to person, place, and time. She appears well-developed.   HENT:   Head: Normocephalic.   Right Ear: External ear normal.   Left Ear: External ear normal.   Mouth/Throat: Oropharynx is clear and moist.   Eyes: Pupils are equal, round, and reactive to light.   Neck: Normal range of motion. Neck supple.   Cardiovascular: Normal rate, regular rhythm and normal heart sounds.     Pulmonary/Chest: Effort normal and breath sounds normal.   Abdominal: Soft. Bowel sounds are normal.   Musculoskeletal: Normal range of motion.        Hands:  Neurological: She is alert and oriented to person, place, and time.   Psychiatric: She has a normal mood and affect.   Vitals reviewed.      Assessment/Plan   Problem List Items Addressed This Visit        Cardiovascular and Mediastinum    Hyperlipidemia    Relevant Orders    CBC & Differential    Comprehensive Metabolic Panel    Vitamin D 25 Hydroxy    Lipid Panel With / Chol / HDL Ratio    T4, Free    TSH    T3, Free    Sedimentation Rate    Rheumatoid Factor    ANTHONY    Hypertension    Relevant Orders    CBC & Differential    Comprehensive Metabolic Panel    Vitamin D 25 Hydroxy    Lipid Panel With / Chol / HDL Ratio    T4, Free    TSH    T3, Free    Sedimentation Rate    Rheumatoid Factor    ANTHONY       Musculoskeletal and Integument    Psoriasis    Relevant Medications    ciclopirox (LOPROX) 1 % shampoo    triamcinolone (KENALOG) 0.1 % cream    Other Relevant Orders    Ambulatory Referral to Rheumatology    CBC & Differential    Comprehensive Metabolic Panel    Vitamin D 25 Hydroxy    Lipid Panel With / Chol / HDL Ratio    T4, Free    TSH    T3, Free    Sedimentation Rate    Rheumatoid Factor    ANTHONY    Ludy's node    Relevant Orders    CBC & Differential    Comprehensive Metabolic Panel    Vitamin D 25 Hydroxy    Lipid Panel With / Chol / HDL Ratio    T4, Free    TSH    T3, Free    Sedimentation Rate    Rheumatoid Factor    ANTHONY       Other    Psoriatic arthritis (CMS/HCC)    Relevant Orders    Ambulatory Referral to Rheumatology    CBC & Differential    Comprehensive Metabolic Panel    Vitamin D 25 Hydroxy    Lipid Panel With / Chol / HDL Ratio    T4, Free    TSH    T3, Free    Sedimentation Rate    Rheumatoid Factor    ANTHONY    Medicare annual wellness visit, subsequent - Primary      Other Visit Diagnoses     Vitamin D deficiency        Relevant Orders     CBC & Differential    Comprehensive Metabolic Panel    Vitamin D 25 Hydroxy    Lipid Panel With / Chol / HDL Ratio    T4, Free    TSH    T3, Free    Sedimentation Rate    Rheumatoid Factor    ANTHONY      Plan: Screening labs return in 6 months referral to rheumatology Dr. Williamson.  Meds remain the same.

## 2018-07-10 NOTE — PATIENT INSTRUCTIONS
Medicare Wellness  Personal Prevention Plan of Service     Date of Office Visit:  07/10/2018  Encounter Provider:  Jewel Arteaga Jr., MD  Place of Service:  [facility] INTERNAL MED[facility]INE  Patient Name: Vikki Robledo  :  1940    As part of the Medicare Wellness portion of your visit today, we are providing you with this personalized preventive plan of services (PPPS). This plan is based upon recommendations of the [place] Preventive Services Task Force (USPSTF) and the Advisory Committee on Immunization Practices (ACIP).    This lists the preventive care services that should be considered, and provides dates of when you are due. Items listed as completed are up-to-date and do not require any further intervention.    Health Maintenance   Topic Date Due   • ZOSTER VACCINE (1 of 2) 1990   • MEDICARE ANNUAL WELLNESS  2016   • LIPID PANEL  2018   • PNEUMOCOCCAL VACCINES (65+ LOW/MEDIUM RISK) (1 of 2 - PCV13) 2018 (Originally 2005)   • INFLUENZA VACCINE  2018   • TDAP/TD VACCINES (2 - Td) 2028       No orders of the defined types were placed in this encounter.      No Follow-up on file.

## 2018-07-10 NOTE — PROGRESS NOTES
QUICK REFERENCE INFORMATION:  The ABCs of the Annual Wellness Visit    Subsequent Medicare Wellness Visit    HEALTH RISK ASSESSMENT    1940    Recent Hospitalizations:  No hospitalization(s) within the last year..        Current Medical Providers:  Patient Care Team:  Jewel Arteaga Jr., MD as PCP - General (Family Medicine)  Kirsten Reynoso MD as Consulting Physician (Dermatology)        Smoking Status:  History   Smoking Status   • Former Smoker   • Types: Cigarettes   Smokeless Tobacco   • Not on file       Alcohol Consumption:  History   Alcohol Use   • Yes     Comment: One or more times monthly        Depression Screen:   PHQ-2/PHQ-9 Depression Screening 7/10/2018   Little interest or pleasure in doing things 0   Feeling down, depressed, or hopeless 0   Trouble falling or staying asleep, or sleeping too much -   Feeling tired or having little energy -   Poor appetite or overeating -   Feeling bad about yourself - or that you are a failure or have let yourself or your family down -   Trouble concentrating on things, such as reading the newspaper or watching television -   Moving or speaking so slowly that other people could have noticed. Or the opposite - being so fidgety or restless that you have been moving around a lot more than usual -   Thoughts that you would be better off dead, or of hurting yourself in some way -   Total Score 0   If you checked off any problems, how difficult have these problems made it for you to do your work, take care of things at home, or get along with other people? -       Health Habits and Functional and Cognitive Screening:  Functional & Cognitive Status 7/10/2018   Do you have difficulty preparing food and eating? No   Do you have difficulty bathing yourself, getting dressed or grooming yourself? No   Do you have difficulty using the toilet? No   Do you have difficulty moving around from place to place? No   Do you have trouble with steps or getting out of a bed or a chair?  No   In the past year have you fallen or experienced a near fall? Yes   Current Diet Well Balanced Diet   Dental Exam Up to date   Eye Exam Up to date   Exercise (times per week) 6 times per week   Current Exercise Activities Include Walking   Do you need help using the phone?  No   Are you deaf or do you have serious difficulty hearing?  No   Do you need help with transportation? No   Do you need help shopping? No   Do you need help preparing meals?  No   Do you need help with housework?  No   Do you need help with laundry? No   Do you need help taking your medications? No   Do you need help managing money? No   Do you ever drive or ride in a car without wearing a seat belt? No   Have you felt unusual stress, anger or loneliness in the last month? No   Who do you live with? Alone   If you need help, do you have trouble finding someone available to you? No   Have you been bothered in the last four weeks by sexual problems? No   Do you have difficulty concentrating, remembering or making decisions? No           Does the patient have evidence of cognitive impairment? No    Aspirin use counseling: Does not need ASA (and currently is not on it)      Recent Lab Results:  CMP:  Lab Results   Component Value Date    GLU 92 04/25/2017    BUN 12 12/10/2017    CREATININE 0.77 12/10/2017    EGFRIFNONA 73 12/10/2017    EGFRIFAFRI 90 04/25/2017    BCR 15.6 12/10/2017     12/10/2017    K 3.8 12/10/2017    CO2 31.0 (H) 12/10/2017    CALCIUM 9.1 12/10/2017    PROTENTOTREF 6.7 04/25/2017    ALBUMIN 3.60 12/10/2017    LABGLOBREF 2.5 04/25/2017    LABIL2 1.1 12/10/2017    BILITOT 0.8 12/10/2017    ALKPHOS 62 12/10/2017    AST 22 12/10/2017    ALT 16 12/10/2017     Lipid Panel:  Lab Results   Component Value Date    TRIG 195 (H) 04/25/2017    HDL 52 04/25/2017    VLDL 39 04/25/2017    LDLHDL 2.41 09/26/2016     HbA1c:       Visual Acuity:  No exam data present    Age-appropriate Screening Schedule:  Refer to the list below for  future screening recommendations based on patient's age, sex and/or medical conditions. Orders for these recommended tests are listed in the plan section. The patient has been provided with a written plan.    Health Maintenance   Topic Date Due   • ZOSTER VACCINE (1 of 2) 06/11/1990   • LIPID PANEL  04/25/2018   • PNEUMOCOCCAL VACCINES (65+ LOW/MEDIUM RISK) (1 of 2 - PCV13) 09/12/2018 (Originally 6/11/2005)   • INFLUENZA VACCINE  08/01/2018   • TDAP/TD VACCINES (2 - Td) 04/22/2028        Subjective   History of Present Illness    Vikki Robledo is a 78 y.o. female who presents for an Subsequent Wellness Visit.    The following portions of the patient's history were reviewed and updated as appropriate: allergies, current medications, past family history, past medical history, past social history, past surgical history and problem list.    Outpatient Medications Prior to Visit   Medication Sig Dispense Refill   • clobetasol (TEMOVATE) 0.05 % external solution APPLY TO SCALP ONCE TO TWICE A DAY IF NEEDED  0   • valsartan-hydrochlorothiazide (DIOVAN-HCT) 160-25 MG per tablet Take 1 tablet by mouth Daily. 90 tablet 3   • ZETIA 10 MG tablet Take 10 mg by mouth. 1 tablet 3 x's a week  0   • calcium carbonate (TUMS) 500 MG chewable tablet Chew 1 tablet.     • meclizine (ANTIVERT) 12.5 MG tablet take 1 tablet by mouth three times a day if needed for dizziness 30 tablet 1   • Multiple Vitamin (MULTI VITAMIN DAILY) tablet Take  by mouth.     • silver sulfadiazine (SILVADENE, SSD) 1 % cream Apply  topically 2 (Two) Times a Day. 400 g 5   • uribel (URO-MP) 118 MG capsule capsule   0   • vitamin D (ERGOCALCIFEROL) 12301 units capsule capsule Take 1 capsule by mouth Every 7 (Seven) Days. 8 capsule 1     No facility-administered medications prior to visit.        Patient Active Problem List   Diagnosis   • Degeneration of intervertebral disc of lumbar region   • Hyperlipidemia   • Hypertension   • Stenosis of carotid  artery   • Pain in joint   • Carotid stenosis   • Muscle spasm   • Transient insomnia       Advance Care Planning:  has NO advance directive - information provided to the patient today    Identification of Risk Factors:  Risk factors include: cardiovascular risk and inadequate social support.    Review of Systems    Compared to one year ago, the patient feels her physical health is the same.  Compared to one year ago, the patient feels her mental health is the same.    Objective     Physical Exam    Vitals:    07/10/18 1341   BP: 132/64   BP Location: Left arm   Patient Position: Sitting   Cuff Size: Large Adult   Pulse: 85   Temp: 97.8 °F (36.6 °C)   TempSrc: Tympanic   SpO2: 97%   Weight: 82.6 kg (182 lb)   PainSc: 0-No pain       Patient's Body mass index is 32.24 kg/m². BMI is above normal parameters. Recommendations include: no follow-up required.      Assessment/Plan   Patient Self-Management and Personalized Health Advice  The patient has been provided with information about: prevention of cardiac or vascular disease and designing advance directives and preventive services including:   · Counseling for cardiovascular disease risk reduction.    Visit Diagnoses:  No diagnosis found.    No orders of the defined types were placed in this encounter.      Outpatient Encounter Prescriptions as of 7/10/2018   Medication Sig Dispense Refill   • ciclopirox (LOPROX) 1 % shampoo Shampoo into scalp and let sit on for 5 minutes 3 times per week.  0   • clobetasol (TEMOVATE) 0.05 % external solution APPLY TO SCALP ONCE TO TWICE A DAY IF NEEDED  0   • triamcinolone (KENALOG) 0.1 % cream Apply  topically.     • valsartan-hydrochlorothiazide (DIOVAN-HCT) 160-25 MG per tablet Take 1 tablet by mouth Daily. 90 tablet 3   • ZETIA 10 MG tablet Take 10 mg by mouth. 1 tablet 3 x's a week  0   • [DISCONTINUED] calcium carbonate (TUMS) 500 MG chewable tablet Chew 1 tablet.     • [DISCONTINUED] triamcinolone (KENALOG) 0.1 % cream apply  to affected area twice a day  0   • meclizine (ANTIVERT) 12.5 MG tablet take 1 tablet by mouth three times a day if needed for dizziness 30 tablet 1   • Multiple Vitamin (MULTI VITAMIN DAILY) tablet Take  by mouth.     • [DISCONTINUED] silver sulfadiazine (SILVADENE, SSD) 1 % cream Apply  topically 2 (Two) Times a Day. 400 g 5   • [DISCONTINUED] uribel (URO-MP) 118 MG capsule capsule   0   • [DISCONTINUED] vitamin D (ERGOCALCIFEROL) 17874 units capsule capsule Take 1 capsule by mouth Every 7 (Seven) Days. 8 capsule 1     No facility-administered encounter medications on file as of 7/10/2018.        Reviewed use of high risk medication in the elderly: not applicable  Reviewed for potential of harmful drug interactions in the elderly: not applicable    Follow Up:  No Follow-up on file.     An After Visit Summary and PPPS with all of these plans were given to the patient.

## 2018-07-11 LAB
25(OH)D3+25(OH)D2 SERPL-MCNC: 26.3 NG/ML (ref 30–100)
ALBUMIN SERPL-MCNC: 4.2 G/DL (ref 3.5–5.2)
ALBUMIN/GLOB SERPL: 2 G/DL
ALP SERPL-CCNC: 57 U/L (ref 39–117)
ALT SERPL-CCNC: 17 U/L (ref 1–33)
ANA SER QL: NEGATIVE
AST SERPL-CCNC: 24 U/L (ref 1–32)
BASOPHILS # BLD AUTO: 0.03 10*3/MM3 (ref 0–0.2)
BASOPHILS NFR BLD AUTO: 0.5 % (ref 0–1.5)
BILIRUB SERPL-MCNC: 1.2 MG/DL (ref 0.1–1.2)
BUN SERPL-MCNC: 13 MG/DL (ref 8–23)
BUN/CREAT SERPL: 17.1 (ref 7–25)
CALCIUM SERPL-MCNC: 9.4 MG/DL (ref 8.6–10.5)
CHLORIDE SERPL-SCNC: 101 MMOL/L (ref 98–107)
CHOLEST SERPL-MCNC: 180 MG/DL (ref 0–200)
CHOLEST/HDLC SERPL: 3.05 {RATIO}
CO2 SERPL-SCNC: 28.1 MMOL/L (ref 22–29)
CREAT SERPL-MCNC: 0.76 MG/DL (ref 0.57–1)
EOSINOPHIL # BLD AUTO: 0.07 10*3/MM3 (ref 0–0.7)
EOSINOPHIL NFR BLD AUTO: 1.3 % (ref 0.3–6.2)
ERYTHROCYTE [DISTWIDTH] IN BLOOD BY AUTOMATED COUNT: 13.6 % (ref 11.7–13)
ERYTHROCYTE [SEDIMENTATION RATE] IN BLOOD BY WESTERGREN METHOD: 3 MM/HR (ref 0–30)
GLOBULIN SER CALC-MCNC: 2.1 GM/DL
GLUCOSE SERPL-MCNC: 86 MG/DL (ref 65–99)
HCT VFR BLD AUTO: 46.9 % (ref 35.6–45.5)
HDLC SERPL-MCNC: 59 MG/DL (ref 40–60)
HGB BLD-MCNC: 15.7 G/DL (ref 11.9–15.5)
IMM GRANULOCYTES # BLD: 0.01 10*3/MM3 (ref 0–0.03)
IMM GRANULOCYTES NFR BLD: 0.2 % (ref 0–0.5)
LDLC SERPL CALC-MCNC: 106 MG/DL (ref 0–100)
LYMPHOCYTES # BLD AUTO: 2.04 10*3/MM3 (ref 0.9–4.8)
LYMPHOCYTES NFR BLD AUTO: 36.5 % (ref 19.6–45.3)
MCH RBC QN AUTO: 30.2 PG (ref 26.9–32)
MCHC RBC AUTO-ENTMCNC: 33.5 G/DL (ref 32.4–36.3)
MCV RBC AUTO: 90.2 FL (ref 80.5–98.2)
MONOCYTES # BLD AUTO: 0.34 10*3/MM3 (ref 0.2–1.2)
MONOCYTES NFR BLD AUTO: 6.1 % (ref 5–12)
NEUTROPHILS # BLD AUTO: 3.11 10*3/MM3 (ref 1.9–8.1)
NEUTROPHILS NFR BLD AUTO: 55.6 % (ref 42.7–76)
PLATELET # BLD AUTO: 160 10*3/MM3 (ref 140–500)
POTASSIUM SERPL-SCNC: 3.9 MMOL/L (ref 3.5–5.2)
PROT SERPL-MCNC: 6.3 G/DL (ref 6–8.5)
RBC # BLD AUTO: 5.2 10*6/MM3 (ref 3.9–5.2)
RHEUMATOID FACT SERPL-ACNC: <10 IU/ML (ref 0–13.9)
SODIUM SERPL-SCNC: 144 MMOL/L (ref 136–145)
T3FREE SERPL-MCNC: 2.7 PG/ML (ref 2–4.4)
T4 FREE SERPL-MCNC: 1.13 NG/DL (ref 0.93–1.7)
TRIGL SERPL-MCNC: 73 MG/DL (ref 0–150)
TSH SERPL DL<=0.005 MIU/L-ACNC: 0.86 MIU/ML (ref 0.27–4.2)
VLDLC SERPL CALC-MCNC: 14.6 MG/DL (ref 5–40)
WBC # BLD AUTO: 5.59 10*3/MM3 (ref 4.5–10.7)

## 2018-07-16 RX ORDER — VALSARTAN AND HYDROCHLOROTHIAZIDE 160; 25 MG/1; MG/1
TABLET ORAL
Qty: 90 TABLET | Refills: 0 | Status: SHIPPED | OUTPATIENT
Start: 2018-07-16 | End: 2018-10-16 | Stop reason: SDUPTHER

## 2018-07-17 ENCOUNTER — TELEPHONE (OUTPATIENT)
Dept: INTERNAL MEDICINE | Facility: CLINIC | Age: 78
End: 2018-07-17

## 2018-07-17 NOTE — TELEPHONE ENCOUNTER
Pt would like a copy of her labs with a note explaining her results, I did tell her that her Vit D was low and to start taking Vit D3 1000 units daily

## 2018-10-16 RX ORDER — VALSARTAN AND HYDROCHLOROTHIAZIDE 160; 25 MG/1; MG/1
TABLET ORAL
Qty: 90 TABLET | Refills: 0 | Status: SHIPPED | OUTPATIENT
Start: 2018-10-16 | End: 2018-12-29 | Stop reason: SDUPTHER

## 2018-12-03 ENCOUNTER — OFFICE VISIT (OUTPATIENT)
Dept: INTERNAL MEDICINE | Facility: CLINIC | Age: 78
End: 2018-12-03

## 2018-12-03 VITALS
HEART RATE: 58 BPM | SYSTOLIC BLOOD PRESSURE: 126 MMHG | DIASTOLIC BLOOD PRESSURE: 75 MMHG | RESPIRATION RATE: 16 BRPM | OXYGEN SATURATION: 96 % | WEIGHT: 179.6 LBS | TEMPERATURE: 97.9 F | HEIGHT: 63 IN | BODY MASS INDEX: 31.82 KG/M2

## 2018-12-03 DIAGNOSIS — R53.83 FATIGUE, UNSPECIFIED TYPE: Primary | ICD-10-CM

## 2018-12-03 DIAGNOSIS — E55.9 HYPOVITAMINOSIS D: ICD-10-CM

## 2018-12-03 DIAGNOSIS — I65.23 BILATERAL CAROTID ARTERY STENOSIS: ICD-10-CM
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
## 2018-12-03 PROCEDURE — 99213 OFFICE O/P EST LOW 20 MIN: CPT | Performed by: NURSE PRACTITIONER

## 2018-12-03 NOTE — PROGRESS NOTES
"Subjective   Vikki Robledo is a 78 y.o. female.   Chief Complaint   Patient presents with   • Fall     would like bloodwork and to discuss her fall       Patient presents for evaluation of fall.  This is a 78-year-old female with a history of carotid stenosis, psoriasis, degenerative disc disease, hypertension, hyperlipidemia, osteoarthritis.  She states that one week ago, she was carrying a small object and stumbled and fell in her kitchen.  She states that she forward and hit her lip and right hip.  She states that she did not hit her head in the fall.  She states that she did not lose consciousness before or after the fall, did not experience any dizziness precipitating the fall, and is confused as to why she fell.  She does have a history of multiple falls due to stumbling and losing her balance.  She states that she fell approximately 2 months ago, at which time she stumbled into a steel door and broke her nose which required a small surgical repair.  She states that she has experienced no dizziness, loss of consciousness, chest pain or discomfort, or shortness of breath.  She does follow with DENA Reich with cardiology, for hyperlipidemia and carotid artery stenosis.  She has an appointment with Ruth Chamberlain on 12/19/18.  She states that she has not been checking her blood pressure at home routinely, but does state that she has a personal and family history of low blood pressure.  She denies dizziness when changing positions or moving her head.  She states \"I would like to get labs drawn to see if there is a reason why I am falling.\"  She does have a history of low vitamin D, but states that she has not been taking supplements.  She denies any loss of sensation in her feet or legs.  She denies development of any other new issues at this time.         The following portions of the patient's history were reviewed and updated as appropriate: allergies, current medications, past family history, past " "medical history, past social history, past surgical history and problem list.    Review of Systems   Constitutional: Positive for fatigue. Negative for activity change, chills, fever, unexpected weight gain and unexpected weight loss.   HENT: Negative for congestion, hearing loss, postnasal drip, sinus pressure, sneezing, sore throat and tinnitus.    Eyes: Negative for photophobia, pain and visual disturbance.   Respiratory: Negative for cough, chest tightness, shortness of breath and wheezing.    Cardiovascular: Negative for chest pain, palpitations and leg swelling.   Gastrointestinal: Negative for abdominal distention, abdominal pain, constipation, diarrhea, nausea and vomiting.   Endocrine: Negative for polydipsia, polyphagia and polyuria.   Genitourinary: Negative for dysuria, frequency, hematuria and urgency.   Musculoskeletal: Positive for arthralgias (  Mild pain to right hip, she states this is resolving).   Neurological: Negative for dizziness, weakness, numbness and headache.   All other systems reviewed and are negative.      Objective    /75 (BP Location: Left arm, Patient Position: Sitting, Cuff Size: Small Adult)   Pulse 58   Temp 97.9 °F (36.6 °C) (Oral)   Resp 16   Ht 160 cm (63\")   Wt 81.5 kg (179 lb 9.6 oz)   SpO2 96%   BMI 31.81 kg/m²     Physical Exam   Constitutional: She is oriented to person, place, and time. She appears well-developed and well-nourished. No distress.   HENT:   Head: Normocephalic and atraumatic.   Right Ear: External ear normal.   Left Ear: External ear normal.   Nose: Nose normal.   Mouth/Throat: Oropharynx is clear and moist. No oropharyngeal exudate.   Eyes: Conjunctivae and EOM are normal. Pupils are equal, round, and reactive to light. Right eye exhibits no discharge. Left eye exhibits no discharge.   Neck: Normal range of motion. Neck supple.   Cardiovascular: Normal rate, regular rhythm, normal heart sounds and intact distal pulses. Exam reveals no gallop " and no friction rub.   No murmur heard.  Pulmonary/Chest: Effort normal and breath sounds normal. No stridor. No respiratory distress. She has no wheezes. She has no rales. She exhibits no tenderness.   Lungs are CTA bilaterally   Abdominal: Soft. Bowel sounds are normal. She exhibits no distension. There is no tenderness.   Musculoskeletal: Normal range of motion. She exhibits no edema, tenderness or deformity.   Lymphadenopathy:     She has no cervical adenopathy.   Neurological: She is alert and oriented to person, place, and time.   Skin: Skin is warm and dry. Capillary refill takes less than 2 seconds. She is not diaphoretic.   Psychiatric: She has a normal mood and affect. Her behavior is normal. Judgment and thought content normal.   Nursing note and vitals reviewed.        Assessment/Plan   Vikki Jessica was seen today for fall.    Diagnoses and all orders for this visit:    Fatigue, unspecified type  -     CBC & Differential  -     Comprehensive metabolic panel  -     Vitamin D 25 hydroxy  -     Magnesium  -     T4, Free  -     TSH  -     Duplex Carotid Ultrasound CAR; Future    Fall, initial encounter  -     CBC & Differential  -     Comprehensive metabolic panel  -     Vitamin D 25 hydroxy  -     Magnesium  -     T4, Free  -     TSH  -     Duplex Carotid Ultrasound CAR; Future    Hypovitaminosis D  -     Vitamin D 25 hydroxy    Bilateral carotid artery stenosis  -     Duplex Carotid Ultrasound CAR; Future    - We will evaluate labs including CBC, CMP, vitamin D, magnesium, free T4 and TSH.  She does follow up with cardiology on 12/19/18, as well.     - Asked patient to continually monitor her blood pressure, once daily, to evaluate for overly low or high BP.  Her hypertension is well-controlled in the office today at 126/75.  I have asked her to record these readings in a log and present them and her follow-up with Dr. Arteaga on 1/10/19.  We will order duplex carotid ultrasound bilaterally to evaluate for  worsening carotid artery stenosis, as she does have a history of this.    - Follow-up if symptoms persist or worsen.  Will call patient with the results of her labs.  Follow-up with cardiology on 12/19/18, and with Dr. Arteaga on 1/10/19.

## 2018-12-04 LAB
25(OH)D3+25(OH)D2 SERPL-MCNC: 27 NG/ML (ref 30–100)
ALBUMIN SERPL-MCNC: 4 G/DL (ref 3.5–5.2)
ALBUMIN/GLOB SERPL: 1.7 G/DL
ALP SERPL-CCNC: 66 U/L (ref 39–117)
ALT SERPL-CCNC: 15 U/L (ref 1–33)
AST SERPL-CCNC: 24 U/L (ref 1–32)
BASOPHILS # BLD AUTO: 0.04 10*3/MM3 (ref 0–0.2)
BASOPHILS NFR BLD AUTO: 0.8 % (ref 0–1.5)
BILIRUB SERPL-MCNC: 1 MG/DL (ref 0.1–1.2)
BUN SERPL-MCNC: 17 MG/DL (ref 8–23)
BUN/CREAT SERPL: 23.3 (ref 7–25)
CALCIUM SERPL-MCNC: 9.4 MG/DL (ref 8.6–10.5)
CHLORIDE SERPL-SCNC: 100 MMOL/L (ref 98–107)
CO2 SERPL-SCNC: 28.6 MMOL/L (ref 22–29)
CREAT SERPL-MCNC: 0.73 MG/DL (ref 0.57–1)
EOSINOPHIL # BLD AUTO: 0.04 10*3/MM3 (ref 0–0.7)
EOSINOPHIL NFR BLD AUTO: 0.8 % (ref 0.3–6.2)
ERYTHROCYTE [DISTWIDTH] IN BLOOD BY AUTOMATED COUNT: 13.3 % (ref 11.7–13)
GLOBULIN SER CALC-MCNC: 2.3 GM/DL
GLUCOSE SERPL-MCNC: 93 MG/DL (ref 65–99)
HCT VFR BLD AUTO: 45.1 % (ref 35.6–45.5)
HGB BLD-MCNC: 15.5 G/DL (ref 11.9–15.5)
IMM GRANULOCYTES # BLD: 0.01 10*3/MM3 (ref 0–0.03)
IMM GRANULOCYTES NFR BLD: 0.2 % (ref 0–0.5)
LYMPHOCYTES # BLD AUTO: 2.03 10*3/MM3 (ref 0.9–4.8)
LYMPHOCYTES NFR BLD AUTO: 41.3 % (ref 19.6–45.3)
MAGNESIUM SERPL-MCNC: 2.2 MG/DL (ref 1.6–2.4)
MCH RBC QN AUTO: 30.2 PG (ref 26.9–32)
MCHC RBC AUTO-ENTMCNC: 34.4 G/DL (ref 32.4–36.3)
MCV RBC AUTO: 87.7 FL (ref 80.5–98.2)
MONOCYTES # BLD AUTO: 0.44 10*3/MM3 (ref 0.2–1.2)
MONOCYTES NFR BLD AUTO: 8.9 % (ref 5–12)
NEUTROPHILS # BLD AUTO: 2.37 10*3/MM3 (ref 1.9–8.1)
NEUTROPHILS NFR BLD AUTO: 48.2 % (ref 42.7–76)
PLATELET # BLD AUTO: 158 10*3/MM3 (ref 140–500)
POTASSIUM SERPL-SCNC: 4.2 MMOL/L (ref 3.5–5.2)
PROT SERPL-MCNC: 6.3 G/DL (ref 6–8.5)
RBC # BLD AUTO: 5.14 10*6/MM3 (ref 3.9–5.2)
SODIUM SERPL-SCNC: 141 MMOL/L (ref 136–145)
T4 FREE SERPL-MCNC: 1.31 NG/DL (ref 0.93–1.7)
TSH SERPL DL<=0.005 MIU/L-ACNC: 0.87 MIU/ML (ref 0.27–4.2)
WBC # BLD AUTO: 4.92 10*3/MM3 (ref 4.5–10.7)

## 2018-12-04 NOTE — PROGRESS NOTES
Please let patient know that her labs came back wonderful, except for a low vitamin D. I would like her to start taking 2,000 units of over the counter Vitamin D3 daily again. Hemoglobin was normal, thyroid labs were normal, kidney and liver function normal, fasting blood sugar normal, potassium and sodium normal. I entered the order for her to get the ultrasound of the carotids to re-check them, as well. Please let us know if she has any concerns or questions.

## 2018-12-12 ENCOUNTER — HOSPITAL ENCOUNTER (OUTPATIENT)
Dept: CARDIOLOGY | Facility: HOSPITAL | Age: 78
Discharge: HOME | End: 2018-12-12
Admitting: NURSE PRACTITIONER
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
## 2018-12-12 DIAGNOSIS — R53.83 FATIGUE, UNSPECIFIED TYPE: ICD-10-CM

## 2018-12-12 DIAGNOSIS — I65.23 BILATERAL CAROTID ARTERY STENOSIS: ICD-10-CM

## 2018-12-12 LAB
BH CV XLRA MEAS LEFT CCA RATIO VEL: 102 CM/SEC
BH CV XLRA MEAS LEFT DIST CCA EDV: 24.6 CM/SEC
BH CV XLRA MEAS LEFT DIST CCA PSV: 102 CM/SEC
BH CV XLRA MEAS LEFT DIST ICA EDV: -23.6 CM/SEC
BH CV XLRA MEAS LEFT DIST ICA PSV: -75.5 CM/SEC
BH CV XLRA MEAS LEFT ICA RATIO VEL: 105 CM/SEC
BH CV XLRA MEAS LEFT ICA/CCA RATIO: 1
BH CV XLRA MEAS LEFT MID ICA EDV: -29.3 CM/SEC
BH CV XLRA MEAS LEFT MID ICA PSV: -98.5 CM/SEC
BH CV XLRA MEAS LEFT PROX CCA EDV: -20.4 CM/SEC
BH CV XLRA MEAS LEFT PROX CCA PSV: -112 CM/SEC
BH CV XLRA MEAS LEFT PROX ECA EDV: -14.1 CM/SEC
BH CV XLRA MEAS LEFT PROX ECA PSV: -118 CM/SEC
BH CV XLRA MEAS LEFT PROX ICA EDV: 24.6 CM/SEC
BH CV XLRA MEAS LEFT PROX ICA PSV: 105 CM/SEC
BH CV XLRA MEAS LEFT PROX SCLA PSV: 139 CM/SEC
BH CV XLRA MEAS LEFT VERTEBRAL A EDV: 10.6 CM/SEC
BH CV XLRA MEAS LEFT VERTEBRAL A PSV: 55 CM/SEC
BH CV XLRA MEAS RIGHT CCA RATIO VEL: 94.4 CM/SEC
BH CV XLRA MEAS RIGHT DIST CCA EDV: 22.3 CM/SEC
BH CV XLRA MEAS RIGHT DIST CCA PSV: 94.4 CM/SEC
BH CV XLRA MEAS RIGHT DIST ICA EDV: -17.3 CM/SEC
BH CV XLRA MEAS RIGHT DIST ICA PSV: -81 CM/SEC
BH CV XLRA MEAS RIGHT ICA RATIO VEL: -90.9 CM/SEC
BH CV XLRA MEAS RIGHT ICA/CCA RATIO: -0.96
BH CV XLRA MEAS RIGHT MID ICA EDV: -19.3 CM/SEC
BH CV XLRA MEAS RIGHT MID ICA PSV: -70.7 CM/SEC
BH CV XLRA MEAS RIGHT PROX CCA EDV: 7 CM/SEC
BH CV XLRA MEAS RIGHT PROX CCA PSV: 68.6 CM/SEC
BH CV XLRA MEAS RIGHT PROX ECA EDV: -14.1 CM/SEC
BH CV XLRA MEAS RIGHT PROX ECA PSV: -116 CM/SEC
BH CV XLRA MEAS RIGHT PROX ICA EDV: -24.6 CM/SEC
BH CV XLRA MEAS RIGHT PROX ICA PSV: -90.9 CM/SEC
BH CV XLRA MEAS RIGHT PROX SCLA PSV: 201 CM/SEC
BH CV XLRA MEAS RIGHT VERTEBRAL A EDV: -8.3 CM/SEC
BH CV XLRA MEAS RIGHT VERTEBRAL A PSV: -48.3 CM/SEC
LEFT ARM BP: NORMAL MMHG
RIGHT ARM BP: NORMAL MMHG

## 2018-12-12 PROCEDURE — 93880 EXTRACRANIAL BILAT STUDY: CPT

## 2018-12-13 NOTE — PROGRESS NOTES
Please call patient and let her know that her carotid doppler showed mild stenosis of the bilateral carotid arteries. Please make sure that she keeps her follow-up with cardiology on 12/19.

## 2018-12-26 ENCOUNTER — TELEPHONE (OUTPATIENT)
Dept: INTERNAL MEDICINE | Facility: CLINIC | Age: 78
End: 2018-12-26

## 2018-12-26 NOTE — TELEPHONE ENCOUNTER
Left messages on both mobile and home numbers, asked her to call back. Let me know when she calls and I will speak with her and answer any questions that she has.

## 2018-12-26 NOTE — TELEPHONE ENCOUNTER
Pt called and said that she saw you and you ordered a Carotid Duplex. And she was supposed to follow up with a cholesterol clinic and that appointment had to be canceled. Pt would like for you to call her to discuss this.

## 2018-12-27 ENCOUNTER — TELEPHONE (OUTPATIENT)
Dept: INTERNAL MEDICINE | Facility: CLINIC | Age: 78
End: 2018-12-27

## 2018-12-27 NOTE — TELEPHONE ENCOUNTER
Called patient and spoke with her about her carotid ultrasound.  Informed her that it showed mild bilateral carotid artery stenosis.  She states that she has an appointment with the cholesterol clinic to see the nurse practitioner in one month.

## 2018-12-31 RX ORDER — VALSARTAN AND HYDROCHLOROTHIAZIDE 160; 25 MG/1; MG/1
TABLET ORAL
Qty: 90 TABLET | Refills: 1 | Status: SHIPPED | OUTPATIENT
Start: 2018-12-31 | End: 2019-08-27 | Stop reason: SDUPTHER

## 2019-01-21 ENCOUNTER — HOSPITAL ENCOUNTER (OUTPATIENT)
Dept: GENERAL RADIOLOGY | Facility: HOSPITAL | Age: 79
Discharge: HOME | End: 2019-01-21
Admitting: NURSE PRACTITIONER

## 2019-01-21 ENCOUNTER — OFFICE VISIT (OUTPATIENT)
Dept: INTERNAL MEDICINE | Facility: CLINIC | Age: 79
End: 2019-01-21

## 2019-01-21 ENCOUNTER — HOSPITAL ENCOUNTER (OUTPATIENT)
Dept: GENERAL RADIOLOGY | Facility: HOSPITAL | Age: 79
Discharge: HOME | End: 2019-01-21

## 2019-01-21 VITALS
DIASTOLIC BLOOD PRESSURE: 75 MMHG | WEIGHT: 178.4 LBS | SYSTOLIC BLOOD PRESSURE: 141 MMHG | TEMPERATURE: 97.7 F | OXYGEN SATURATION: 95 % | BODY MASS INDEX: 31.61 KG/M2 | RESPIRATION RATE: 16 BRPM | HEART RATE: 67 BPM | HEIGHT: 63 IN
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
## 2019-01-21 DIAGNOSIS — R07.81 RIB PAIN ON RIGHT SIDE: ICD-10-CM

## 2019-01-21 PROCEDURE — 99213 OFFICE O/P EST LOW 20 MIN: CPT | Performed by: NURSE PRACTITIONER

## 2019-01-21 PROCEDURE — 71101 X-RAY EXAM UNILAT RIBS/CHEST: CPT

## 2019-01-21 PROCEDURE — 74019 RADEX ABDOMEN 2 VIEWS: CPT

## 2019-01-21 NOTE — PROGRESS NOTES
"Subjective   Vikki Robledo is a 78 y.o. female.   Chief Complaint   Patient presents with   • Follow-up     fall       Patient presents for evaluation following a fall.  This is 78-year-old female patient of Dr. orlando with a history of hyperlipidemia and carotid stenosis.  She states that she sustained a mechanical fall in her home 1 week ago after tripping over a table leg in the doorway.  She denies any loss of consciousness following the fall, but does state that she has bruising to her right forehead, and right flank.  She states that she had no headache following the fall, visual changes, loss of consciousness, shortness of breath, or chest discomfort.  She does state that she has bruising to the right rib cage and flank, and has some lingering pain in the right rib cage.  She feels that she may have some pain in the right upper quadrant of the abdomen, but feels that this may be related to the rib cage.  She has had no blood or mucus in her stools.  She states that the bruising to her forehead is healing up, as well as the bruising to her rib cage.  She states that there are certain positions in which her rib cage does not hurt at all, but when she makes \"twisting\" motions, the pain worsens.  She denies shortness of breath or chest pain today.  She denies development of any other new issues today.         The following portions of the patient's history were reviewed and updated as appropriate: allergies, current medications, past family history, past medical history, past social history, past surgical history and problem list.    Review of Systems   Constitutional: Negative for activity change, chills, fatigue, fever, unexpected weight gain and unexpected weight loss.   HENT: Negative for congestion, hearing loss, postnasal drip, sinus pressure, sneezing, sore throat and tinnitus.    Eyes: Negative for photophobia, pain and visual disturbance.   Respiratory: Negative for cough, chest tightness, " "shortness of breath and wheezing.    Cardiovascular: Negative for chest pain, palpitations and leg swelling.   Gastrointestinal: Positive for abdominal pain (  Right upper quadrant). Negative for abdominal distention, constipation, diarrhea, nausea and vomiting.   Endocrine: Negative for polydipsia, polyphagia and polyuria.   Genitourinary: Negative for dysuria, frequency, hematuria and urgency.   Musculoskeletal: Positive for arthralgias (  Rib pain  , Right).   Skin: Positive for bruise (  Right forearm, right rib cage).   Neurological: Negative for dizziness, facial asymmetry, weakness, light-headedness, numbness, headache, memory problem and confusion.   All other systems reviewed and are negative.      Objective    /75 (BP Location: Left arm, Patient Position: Sitting, Cuff Size: Large Adult)   Pulse 67   Temp 97.7 °F (36.5 °C) (Oral)   Resp 16   Ht 160 cm (63\")   Wt 80.9 kg (178 lb 6.4 oz)   SpO2 95%   BMI 31.60 kg/m²     Physical Exam   Constitutional: She is oriented to person, place, and time. She appears well-developed and well-nourished. No distress.   HENT:   Head: Normocephalic and atraumatic.   Right Ear: External ear normal.   Left Ear: External ear normal.   Nose: Nose normal.   Mouth/Throat: Oropharynx is clear and moist. No oropharyngeal exudate.   Eyes: Conjunctivae and EOM are normal. Pupils are equal, round, and reactive to light. Right eye exhibits no discharge. Left eye exhibits no discharge.   Neck: Normal range of motion. Neck supple. No JVD present. No tracheal deviation present. No thyromegaly present.   Cardiovascular: Normal rate, regular rhythm, normal heart sounds and intact distal pulses. Exam reveals no gallop and no friction rub.   No murmur heard.  Pulmonary/Chest: Effort normal and breath sounds normal. No stridor. No respiratory distress. She has no wheezes. She has no rales. She exhibits no tenderness.   Lungs are CTA bilaterally   Abdominal: Soft. Normal appearance " and bowel sounds are normal. She exhibits no distension and no mass. There is no hepatosplenomegaly. There is no tenderness. There is no rigidity, no rebound, no guarding, no CVA tenderness, no tenderness at McBurney's point and negative Morales's sign. No hernia.   Bowel sounds active ×4 quadrants.  No pain to light or deep palpation ×4 quadrants   Musculoskeletal: Normal range of motion. She exhibits tenderness. She exhibits no edema or deformity.   Tenderness to palpation of right rib cage.  Bruising scattered throughout right ribs, purple.  No obvious deformity noted.   Lymphadenopathy:     She has no cervical adenopathy.   Neurological: She is alert and oriented to person, place, and time.   Skin: Skin is warm and dry. Capillary refill takes less than 2 seconds. She is not diaphoretic.   Purple bruising throughout right rib cage.  Yellow to purple bruising above right eyebrow and to right forehead and temple   Psychiatric: She has a normal mood and affect. Her behavior is normal. Judgment and thought content normal.   Nursing note and vitals reviewed.        Assessment/Plan   Vikki Jessica was seen today for follow-up.    Diagnoses and all orders for this visit:    Fall, initial encounter  -     CBC & Differential  -     XR abdomen 3 vw  -     XR ribs right w pa chest  -     Comprehensive metabolic panel    Rib pain on right side  -     CBC & Differential  -     XR abdomen 3 vw  -     XR ribs right w pa chest  -     Comprehensive metabolic panel    - We will obtain an x-ray of the abdomen as well as the right ribs to evaluate for abnormality or fracture.  Also will draw CBC and CMP.  She does not take any blood thinners.  This is the second fall recently.  Spoke with patient about a physical therapy consult for strengthening and evaluation of gait.  She defers this at this time, stating that she would like to hold off.  Educated her on the importance of this consult, but she was selected for at this time.  She is  using a quad cane to ambulate home.  Educated her on falls prevention and safety.    - Follow-up if symptoms persist or worsen.  We will call patient with the results of her labs and x-rays.

## 2019-01-22 LAB
ALBUMIN SERPL-MCNC: 4.4 G/DL (ref 3.5–5.2)
ALBUMIN/GLOB SERPL: 1.6 G/DL
ALP SERPL-CCNC: 68 U/L (ref 39–117)
ALT SERPL-CCNC: 20 U/L (ref 1–33)
AST SERPL-CCNC: 25 U/L (ref 1–32)
BASOPHILS # BLD AUTO: 0.03 10*3/MM3 (ref 0–0.2)
BASOPHILS NFR BLD AUTO: 0.4 % (ref 0–1.5)
BILIRUB SERPL-MCNC: 0.9 MG/DL (ref 0.1–1.2)
BUN SERPL-MCNC: 15 MG/DL (ref 8–23)
BUN/CREAT SERPL: 19 (ref 7–25)
CALCIUM SERPL-MCNC: 9.6 MG/DL (ref 8.6–10.5)
CHLORIDE SERPL-SCNC: 100 MMOL/L (ref 98–107)
CO2 SERPL-SCNC: 30.3 MMOL/L (ref 22–29)
CREAT SERPL-MCNC: 0.79 MG/DL (ref 0.57–1)
EOSINOPHIL # BLD AUTO: 0.05 10*3/MM3 (ref 0–0.7)
EOSINOPHIL NFR BLD AUTO: 0.7 % (ref 0.3–6.2)
ERYTHROCYTE [DISTWIDTH] IN BLOOD BY AUTOMATED COUNT: 13.9 % (ref 11.7–13)
GLOBULIN SER CALC-MCNC: 2.7 GM/DL
GLUCOSE SERPL-MCNC: 98 MG/DL (ref 65–99)
HCT VFR BLD AUTO: 49.4 % (ref 35.6–45.5)
HGB BLD-MCNC: 16.4 G/DL (ref 11.9–15.5)
IMM GRANULOCYTES # BLD AUTO: 0.02 10*3/MM3 (ref 0–0.03)
IMM GRANULOCYTES NFR BLD AUTO: 0.3 % (ref 0–0.5)
LYMPHOCYTES # BLD AUTO: 2.58 10*3/MM3 (ref 0.9–4.8)
LYMPHOCYTES NFR BLD AUTO: 33.9 % (ref 19.6–45.3)
MCH RBC QN AUTO: 30.4 PG (ref 26.9–32)
MCHC RBC AUTO-ENTMCNC: 33.2 G/DL (ref 32.4–36.3)
MCV RBC AUTO: 91.5 FL (ref 80.5–98.2)
MONOCYTES # BLD AUTO: 0.46 10*3/MM3 (ref 0.2–1.2)
MONOCYTES NFR BLD AUTO: 6.1 % (ref 5–12)
NEUTROPHILS # BLD AUTO: 4.46 10*3/MM3 (ref 1.9–8.1)
NEUTROPHILS NFR BLD AUTO: 58.6 % (ref 42.7–76)
PLATELET # BLD AUTO: 187 10*3/MM3 (ref 140–500)
POTASSIUM SERPL-SCNC: 3.8 MMOL/L (ref 3.5–5.2)
PROT SERPL-MCNC: 7.1 G/DL (ref 6–8.5)
RBC # BLD AUTO: 5.4 10*6/MM3 (ref 3.9–5.2)
SODIUM SERPL-SCNC: 142 MMOL/L (ref 136–145)
WBC # BLD AUTO: 7.6 10*3/MM3 (ref 4.5–10.7)

## 2019-01-22 NOTE — PROGRESS NOTES
Please let patient know that her labs came back stable except for some signs of dehydration. Please be sure the drink plenty of water. Her metabolic panel looked good including kidney and liver function tests. I am waiting on the results of her XRAYS.

## 2019-01-22 NOTE — PROGRESS NOTES
Please let patient know that her x-ray of the chest and ribs showed that she likely has a hairline fracture of the anterior lateral 10th rib. Her lungs appear clear, and the abdominal x-ray showed some stool in her colon but no acute problems. Her rib fracture is not displaced.

## 2019-02-11 ENCOUNTER — OFFICE VISIT (OUTPATIENT)
Dept: INTERNAL MEDICINE | Facility: CLINIC | Age: 79
End: 2019-02-11

## 2019-02-11 VITALS
SYSTOLIC BLOOD PRESSURE: 98 MMHG | HEART RATE: 98 BPM | BODY MASS INDEX: 31 KG/M2 | OXYGEN SATURATION: 96 % | TEMPERATURE: 98 F | DIASTOLIC BLOOD PRESSURE: 62 MMHG | WEIGHT: 175 LBS

## 2019-02-11 DIAGNOSIS — I10 ESSENTIAL HYPERTENSION: Primary | ICD-10-CM

## 2019-02-11 DIAGNOSIS — R73.09 ELEVATED GLUCOSE: ICD-10-CM

## 2019-02-11 DIAGNOSIS — I65.23 BILATERAL CAROTID ARTERY STENOSIS: ICD-10-CM

## 2019-02-11 DIAGNOSIS — E78.2 MIXED HYPERLIPIDEMIA: ICD-10-CM

## 2019-02-11 DIAGNOSIS — L40.50 PSORIATIC ARTHRITIS (HCC): ICD-10-CM

## 2019-02-11 PROCEDURE — 99214 OFFICE O/P EST MOD 30 MIN: CPT | Performed by: FAMILY MEDICINE

## 2019-02-11 NOTE — PROGRESS NOTES
Subjective   Vikki Robledo is a 78 y.o. female.     Chief Complaint   Patient presents with   • Hyperlipidemia   • Hypertension   • Rash         History of Present Illness   Patient is delightful lady with a history of psoriasis treated by Dr. Ca also some degree of degenerative disc disease lumbar spine and evidence of osteoarthritis.  She has a history of some carotid stenosis as well as hypertension hyperlipidemia.  She's been intolerant to statins and is on Zetia for hypercholesterolemia or repeat labs today.  She had polycythemia on recent labs done by Lori.    We discussed fall risk and she is fallen 2 times the past year.  Review labs and consider referral for balance training.      The following portions of the patient's history were reviewed and updated as appropriate: allergies, current medications, past social history and problem list.    Review of Systems   Constitutional: Negative.    HENT: Negative.    Eyes: Negative.    Respiratory: Negative.    Cardiovascular: Negative.    Gastrointestinal: Negative.    Endocrine: Negative.    Genitourinary: Negative.    Musculoskeletal: Negative.    Skin: Positive for rash.   Allergic/Immunologic: Negative.    Neurological: Negative.    Hematological: Negative.    Psychiatric/Behavioral: Negative.        Objective   Vitals:    02/11/19 1322   BP: 98/62   Pulse: 98   Temp: 98 °F (36.7 °C)   SpO2: 96%     Physical Exam   Constitutional: She is oriented to person, place, and time. She appears well-developed and well-nourished.   HENT:   Head: Normocephalic and atraumatic.   Right Ear: Tympanic membrane and external ear normal.   Left Ear: Tympanic membrane and external ear normal.   Nose: Nose normal.   Mouth/Throat: Oropharynx is clear and moist.   Eyes: Conjunctivae and EOM are normal. Pupils are equal, round, and reactive to light.   Neck: Normal range of motion. Neck supple. No JVD present. No thyromegaly present.   Cardiovascular: Normal rate,  regular rhythm, normal heart sounds and intact distal pulses.   Pulmonary/Chest: Effort normal and breath sounds normal.   Abdominal: Soft. Bowel sounds are normal.   Musculoskeletal:        Lumbar back: She exhibits decreased range of motion.   Lymphadenopathy:     She has no cervical adenopathy.   Neurological: She is alert and oriented to person, place, and time. No cranial nerve deficit. Coordination normal.   Skin: Skin is warm and dry. No rash noted.   Psychiatric: She has a normal mood and affect. Her behavior is normal. Judgment and thought content normal.   Vitals reviewed.      Assessment/Plan   Problem List Items Addressed This Visit        Cardiovascular and Mediastinum    Carotid stenosis    Relevant Orders    CBC & Differential    Lipid Panel With / Chol / HDL Ratio    Hemoglobin A1c    TSH    T4, Free    Urinalysis With Microscopic If Indicated (No Culture) - Urine, Clean Catch    Basic Metabolic Panel    Sedimentation Rate    Hyperlipidemia    Relevant Orders    CBC & Differential    Lipid Panel With / Chol / HDL Ratio    Hemoglobin A1c    TSH    T4, Free    Urinalysis With Microscopic If Indicated (No Culture) - Urine, Clean Catch    Basic Metabolic Panel    Sedimentation Rate    Hypertension - Primary    Relevant Orders    CBC & Differential    Lipid Panel With / Chol / HDL Ratio    Hemoglobin A1c    TSH    T4, Free    Urinalysis With Microscopic If Indicated (No Culture) - Urine, Clean Catch    Basic Metabolic Panel    Sedimentation Rate       Musculoskeletal and Integument    Psoriatic arthritis (CMS/HCC)    Relevant Orders    CBC & Differential    Lipid Panel With / Chol / HDL Ratio    Hemoglobin A1c    TSH    T4, Free    Urinalysis With Microscopic If Indicated (No Culture) - Urine, Clean Catch    Basic Metabolic Panel    Sedimentation Rate      Other Visit Diagnoses     Elevated glucose         Relevant Orders    Hemoglobin A1c      Labs pending will also get a sedimentation rate will see her  back in about 6 months for Medicare wellness.  Consider referral for balance retraining if need be.  She is fallen 2 times within the past year both of which seem circumstantial.

## 2019-02-12 LAB
APPEARANCE UR: ABNORMAL
BACTERIA #/AREA URNS HPF: ABNORMAL /[HPF]
BASOPHILS # BLD AUTO: 0 X10E3/UL (ref 0–0.2)
BASOPHILS NFR BLD AUTO: 1 %
BILIRUB UR QL STRIP: NEGATIVE
BUN SERPL-MCNC: 14 MG/DL (ref 8–27)
BUN/CREAT SERPL: 15 (ref 12–28)
CALCIUM SERPL-MCNC: 9.5 MG/DL (ref 8.7–10.3)
CASTS URNS MICRO: ABNORMAL
CASTS URNS QL MICRO: PRESENT /LPF
CHLORIDE SERPL-SCNC: 100 MMOL/L (ref 96–106)
CHOLEST SERPL-MCNC: 196 MG/DL (ref 100–199)
CHOLEST/HDLC SERPL: 3.2 RATIO (ref 0–4.4)
CO2 SERPL-SCNC: 28 MMOL/L (ref 20–29)
COLOR UR: YELLOW
CREAT SERPL-MCNC: 0.92 MG/DL (ref 0.57–1)
CRYSTALS URNS MICRO: ABNORMAL
EOSINOPHIL # BLD AUTO: 0 X10E3/UL (ref 0–0.4)
EOSINOPHIL NFR BLD AUTO: 0 %
EPI CELLS #/AREA URNS HPF: ABNORMAL /HPF
ERYTHROCYTE [DISTWIDTH] IN BLOOD BY AUTOMATED COUNT: 13.8 % (ref 12.3–15.4)
ERYTHROCYTE [SEDIMENTATION RATE] IN BLOOD BY WESTERGREN METHOD: 2 MM/HR (ref 0–40)
GLUCOSE SERPL-MCNC: 101 MG/DL (ref 65–99)
GLUCOSE UR QL: NEGATIVE
HBA1C MFR BLD: 5.3 % (ref 4.8–5.6)
HCT VFR BLD AUTO: 48.6 % (ref 34–46.6)
HDLC SERPL-MCNC: 61 MG/DL
HGB BLD-MCNC: 16.6 G/DL (ref 11.1–15.9)
HGB UR QL STRIP: NEGATIVE
IMM GRANULOCYTES # BLD AUTO: 0 X10E3/UL (ref 0–0.1)
IMM GRANULOCYTES NFR BLD AUTO: 0 %
KETONES UR QL STRIP: ABNORMAL
LDLC SERPL CALC-MCNC: 113 MG/DL (ref 0–99)
LEUKOCYTE ESTERASE UR QL STRIP: ABNORMAL
LYMPHOCYTES # BLD AUTO: 2 X10E3/UL (ref 0.7–3.1)
LYMPHOCYTES NFR BLD AUTO: 24 %
MCH RBC QN AUTO: 30.2 PG (ref 26.6–33)
MCHC RBC AUTO-ENTMCNC: 34.2 G/DL (ref 31.5–35.7)
MCV RBC AUTO: 88 FL (ref 79–97)
MICRO URNS: ABNORMAL
MONOCYTES # BLD AUTO: 0.5 X10E3/UL (ref 0.1–0.9)
MONOCYTES NFR BLD AUTO: 6 %
MUCOUS THREADS URNS QL MICRO: PRESENT
NEUTROPHILS # BLD AUTO: 5.8 X10E3/UL (ref 1.4–7)
NEUTROPHILS NFR BLD AUTO: 69 %
NITRITE UR QL STRIP: NEGATIVE
PH UR STRIP: 5.5 [PH] (ref 5–7.5)
PLATELET # BLD AUTO: 199 X10E3/UL (ref 150–379)
POTASSIUM SERPL-SCNC: 3.7 MMOL/L (ref 3.5–5.2)
PROT UR QL STRIP: NEGATIVE
RBC # BLD AUTO: 5.5 X10E6/UL (ref 3.77–5.28)
RBC #/AREA URNS HPF: ABNORMAL /HPF
SODIUM SERPL-SCNC: 144 MMOL/L (ref 134–144)
SP GR UR: 1.02 (ref 1–1.03)
T4 FREE SERPL-MCNC: 1.42 NG/DL (ref 0.82–1.77)
TRIGL SERPL-MCNC: 111 MG/DL (ref 0–149)
TSH SERPL DL<=0.005 MIU/L-ACNC: 0.69 UIU/ML (ref 0.45–4.5)
UNIDENT CRYS URNS QL MICRO: PRESENT
UROBILINOGEN UR STRIP-MCNC: 1 MG/DL (ref 0.2–1)
VLDLC SERPL CALC-MCNC: 22 MG/DL (ref 5–40)
WBC # BLD AUTO: 8.3 X10E3/UL (ref 3.4–10.8)
WBC #/AREA URNS HPF: ABNORMAL /HPF

## 2019-08-22 ENCOUNTER — OFFICE VISIT (OUTPATIENT)
Dept: INTERNAL MEDICINE | Facility: CLINIC | Age: 79
End: 2019-08-22

## 2019-08-22 VITALS
OXYGEN SATURATION: 96 % | HEART RATE: 58 BPM | TEMPERATURE: 97.9 F | WEIGHT: 176.4 LBS | BODY MASS INDEX: 31.25 KG/M2 | HEIGHT: 63 IN | DIASTOLIC BLOOD PRESSURE: 70 MMHG | RESPIRATION RATE: 16 BRPM | SYSTOLIC BLOOD PRESSURE: 129 MMHG

## 2019-08-22 DIAGNOSIS — Z00.00 MEDICARE ANNUAL WELLNESS VISIT, SUBSEQUENT: Primary | ICD-10-CM

## 2019-08-22 DIAGNOSIS — Z00.00 HEALTHCARE MAINTENANCE: ICD-10-CM

## 2019-08-22 DIAGNOSIS — I10 ESSENTIAL HYPERTENSION: ICD-10-CM

## 2019-08-22 DIAGNOSIS — F51.02 TRANSIENT INSOMNIA: ICD-10-CM

## 2019-08-22 DIAGNOSIS — E78.2 MIXED HYPERLIPIDEMIA: ICD-10-CM

## 2019-08-22 DIAGNOSIS — Z91.81 RISK FOR FALLS: ICD-10-CM

## 2019-08-22 DIAGNOSIS — R26.89 IMBALANCE: ICD-10-CM

## 2019-08-22 DIAGNOSIS — E55.9 HYPOVITAMINOSIS D: ICD-10-CM

## 2019-08-22 LAB
25(OH)D3+25(OH)D2 SERPL-MCNC: 32.2 NG/ML (ref 30–100)
ALBUMIN SERPL-MCNC: 4.4 G/DL (ref 3.5–5.2)
ALBUMIN/GLOB SERPL: 2 G/DL
ALP SERPL-CCNC: 66 U/L (ref 39–117)
ALT SERPL-CCNC: 22 U/L (ref 1–33)
APPEARANCE UR: CLEAR
AST SERPL-CCNC: 25 U/L (ref 1–32)
BACTERIA #/AREA URNS HPF: NORMAL /HPF
BASOPHILS # BLD AUTO: 0.05 10*3/MM3 (ref 0–0.2)
BASOPHILS NFR BLD AUTO: 0.8 % (ref 0–1.5)
BILIRUB SERPL-MCNC: 1.1 MG/DL (ref 0.2–1.2)
BILIRUB UR QL STRIP: NEGATIVE
BUN SERPL-MCNC: 20 MG/DL (ref 8–23)
BUN/CREAT SERPL: 23.3 (ref 7–25)
CALCIUM SERPL-MCNC: 9.1 MG/DL (ref 8.6–10.5)
CASTS URNS MICRO: NORMAL
CHLORIDE SERPL-SCNC: 101 MMOL/L (ref 98–107)
CHOLEST SERPL-MCNC: 173 MG/DL (ref 0–200)
CO2 SERPL-SCNC: 31.7 MMOL/L (ref 22–29)
COLOR UR: (no result)
CREAT SERPL-MCNC: 0.86 MG/DL (ref 0.57–1)
CRP SERPL-MCNC: 0.1 MG/DL (ref 0–0.5)
EOSINOPHIL # BLD AUTO: 0.05 10*3/MM3 (ref 0–0.4)
EOSINOPHIL NFR BLD AUTO: 0.8 % (ref 0.3–6.2)
EPI CELLS #/AREA URNS HPF: NORMAL /HPF
ERYTHROCYTE [DISTWIDTH] IN BLOOD BY AUTOMATED COUNT: 13 % (ref 12.3–15.4)
GLOBULIN SER CALC-MCNC: 2.2 GM/DL
GLUCOSE SERPL-MCNC: 93 MG/DL (ref 65–99)
GLUCOSE UR QL: NEGATIVE
HCT VFR BLD AUTO: 48.1 % (ref 34–46.6)
HDLC SERPL-MCNC: 55 MG/DL (ref 40–60)
HGB BLD-MCNC: 16.2 G/DL (ref 12–15.9)
HGB UR QL STRIP: NEGATIVE
IMM GRANULOCYTES # BLD AUTO: 0.02 10*3/MM3 (ref 0–0.05)
IMM GRANULOCYTES NFR BLD AUTO: 0.3 % (ref 0–0.5)
KETONES UR QL STRIP: NEGATIVE
LDLC SERPL CALC-MCNC: 99 MG/DL (ref 0–100)
LEUKOCYTE ESTERASE UR QL STRIP: (no result)
LYMPHOCYTES # BLD AUTO: 2.62 10*3/MM3 (ref 0.7–3.1)
LYMPHOCYTES NFR BLD AUTO: 41.8 % (ref 19.6–45.3)
MCH RBC QN AUTO: 30.4 PG (ref 26.6–33)
MCHC RBC AUTO-ENTMCNC: 33.7 G/DL (ref 31.5–35.7)
MCV RBC AUTO: 90.2 FL (ref 79–97)
MONOCYTES # BLD AUTO: 0.47 10*3/MM3 (ref 0.1–0.9)
MONOCYTES NFR BLD AUTO: 7.5 % (ref 5–12)
NEUTROPHILS # BLD AUTO: 3.06 10*3/MM3 (ref 1.7–7)
NEUTROPHILS NFR BLD AUTO: 48.8 % (ref 42.7–76)
NITRITE UR QL STRIP: NEGATIVE
NRBC BLD AUTO-RTO: 0.2 /100 WBC (ref 0–0.2)
PH UR STRIP: 5.5 [PH] (ref 5–8)
PLATELET # BLD AUTO: 165 10*3/MM3 (ref 140–450)
POTASSIUM SERPL-SCNC: 3.7 MMOL/L (ref 3.5–5.2)
PROT SERPL-MCNC: 6.6 G/DL (ref 6–8.5)
PROT UR QL STRIP: NEGATIVE
RBC # BLD AUTO: 5.33 10*6/MM3 (ref 3.77–5.28)
RBC #/AREA URNS HPF: NORMAL /HPF
SODIUM SERPL-SCNC: 143 MMOL/L (ref 136–145)
SP GR UR: 1.02 (ref 1–1.03)
TRIGL SERPL-MCNC: 97 MG/DL (ref 0–150)
UROBILINOGEN UR STRIP-MCNC: (no result) MG/DL
VLDLC SERPL CALC-MCNC: 19.4 MG/DL
WBC # BLD AUTO: 6.27 10*3/MM3 (ref 3.4–10.8)
WBC #/AREA URNS HPF: NORMAL /HPF

## 2019-08-22 PROCEDURE — G0439 PPPS, SUBSEQ VISIT: HCPCS | Performed by: NURSE PRACTITIONER

## 2019-08-22 RX ORDER — SILVER SULFADIAZINE 1 %
CREAM (GRAM) TOPICAL
Refills: 3 | COMMUNITY
Start: 2019-07-15 | End: 2020-02-28

## 2019-08-22 RX ORDER — TRETINOIN 0.1 MG/G
GEL TOPICAL
Refills: 2 | COMMUNITY
Start: 2019-07-15 | End: 2021-03-29

## 2019-08-22 RX ORDER — DAKIN'S SOLUTION 0.125% (QUARTER STRENGTH) 0.12 %
SOLUTION MISCELLANEOUS
Refills: 2 | COMMUNITY
Start: 2019-07-16 | End: 2020-02-28

## 2019-08-22 RX ORDER — MULTIVIT-MINS NO.7/FOLIC ACID 1 MG
1 CAPSULE ORAL DAILY
Refills: 4 | COMMUNITY
Start: 2019-07-26 | End: 2022-10-11

## 2019-08-22 RX ORDER — DESONIDE 0.5 MG/G
OINTMENT TOPICAL
Refills: 1 | COMMUNITY
Start: 2019-08-12 | End: 2020-02-28

## 2019-08-22 NOTE — PATIENT INSTRUCTIONS
Please get the shingrex vaccine at your pharmacy.     Medicare Wellness  Personal Prevention Plan of Service     Date of Office Visit:  2019  Encounter Provider:  DENA Reveles  Place of Service:  [facility]  Patient Name: Vikki Robledo  :  1940    As part of the Medicare Wellness portion of your visit today, we are providing you with this personalized preventive plan of services (PPPS). This plan is based upon recommendations of the [place] Preventive Services Task Force (USPSTF) and the Advisory Committee on Immunization Practices (ACIP).    This lists the preventive care services that should be considered, and provides dates of when you are due. Items listed as completed are up-to-date and do not require any further intervention.    Health Maintenance   Topic Date Due   • ZOSTER VACCINE (1 of 2) 1990   • MEDICARE ANNUAL WELLNESS  07/10/2019   • INFLUENZA VACCINE  2019   • PNEUMOCOCCAL VACCINES (65+ LOW/MEDIUM RISK) (2 of 2 - PCV13) 2020 (Originally 2016)   • LIPID PANEL  2020   • TDAP/TD VACCINES (2 - Td) 2028       No orders of the defined types were placed in this encounter.      No Follow-up on file.

## 2019-08-22 NOTE — PROGRESS NOTES
The ABCs of the Annual Wellness Visit  Subsequent Medicare Wellness Visit    Chief Complaint   Patient presents with   • Medicare Wellness-subsequent     Pt presents here today for a medicare wellness visit.       Subjective   History of Present Illness:  Vikki Robledo is a 79 y.o. female who presents for a Subsequent Medicare Wellness Visit.    HEALTH RISK ASSESSMENT    Recent Hospitalizations:  No hospitalization(s) within the last year.    Current Medical Providers:  Patient Care Team:  Jewel Arteaga Jr., MD as PCP - General (Family Medicine)  Jewel Arteaga Jr., MD as PCP - Claims Attributed  Kirsten Reynoso MD as Consulting Physician (Dermatology)    Smoking Status:  Social History     Tobacco Use   Smoking Status Former Smoker   • Types: Cigarettes       Alcohol Consumption:  Social History     Substance and Sexual Activity   Alcohol Use Yes    Comment: One or more times monthly        Depression Screen:   PHQ-2/PHQ-9 Depression Screening 8/22/2019   Little interest or pleasure in doing things 0   Feeling down, depressed, or hopeless 0   Trouble falling or staying asleep, or sleeping too much -   Feeling tired or having little energy -   Poor appetite or overeating -   Feeling bad about yourself - or that you are a failure or have let yourself or your family down -   Trouble concentrating on things, such as reading the newspaper or watching television -   Moving or speaking so slowly that other people could have noticed. Or the opposite - being so fidgety or restless that you have been moving around a lot more than usual -   Thoughts that you would be better off dead, or of hurting yourself in some way -   Total Score 0   If you checked off any problems, how difficult have these problems made it for you to do your work, take care of things at home, or get along with other people? -       Fall Risk Screen:  STEADI Fall Risk Assessment was completed, and patient is at LOW risk for falls.Assessment  completed on:8/22/2019    Health Habits and Functional and Cognitive Screening:  Functional & Cognitive Status 8/22/2019   Do you have difficulty preparing food and eating? No   Do you have difficulty bathing yourself, getting dressed or grooming yourself? No   Do you have difficulty using the toilet? No   Do you have difficulty moving around from place to place? No   Do you have trouble with steps or getting out of a bed or a chair? No   Current Diet Well Balanced Diet   Dental Exam Up to date   Eye Exam Up to date   Exercise (times per week) 3 times per week   Current Exercise Activities Include Walking   Do you need help using the phone?  No   Are you deaf or do you have serious difficulty hearing?  No   Do you need help with transportation? No   Do you need help shopping? No   Do you need help preparing meals?  No   Do you need help with housework?  No   Do you need help with laundry? No   Do you need help taking your medications? No   Do you need help managing money? No   Do you ever drive or ride in a car without wearing a seat belt? No   Have you felt unusual stress, anger or loneliness in the last month? No   Who do you live with? Alone   If you need help, do you have trouble finding someone available to you? No   Have you been bothered in the last four weeks by sexual problems? No   Do you have difficulty concentrating, remembering or making decisions? No         Does the patient have evidence of cognitive impairment? No    Asprin use counseling:Does not need ASA (and currently is not on it)    Age-appropriate Screening Schedule:  Refer to the list below for future screening recommendations based on patient's age, sex and/or medical conditions. Orders for these recommended tests are listed in the plan section. The patient has been provided with a written plan.    Health Maintenance   Topic Date Due   • ZOSTER VACCINE (1 of 2) 06/11/1990   • INFLUENZA VACCINE  08/01/2019   • PNEUMOCOCCAL VACCINES (65+  LOW/MEDIUM RISK) (2 of 2 - PCV13) 02/19/2020 (Originally 12/30/2016)   • LIPID PANEL  02/11/2020   • TDAP/TD VACCINES (2 - Td) 04/22/2028          The following portions of the patient's history were reviewed and updated as appropriate: allergies, current medications, past family history, past medical history, past social history, past surgical history and problem list.    Outpatient Medications Prior to Visit   Medication Sig Dispense Refill   • ciclopirox (LOPROX) 1 % shampoo Shampoo into scalp and let sit on for 5 minutes 3 times per week.  0   • desonide (DESOWEN) 0.05 % ointment APPLY SPARINGLY BID TO FACE  1   • H-CHLOR 12 0.125 % topical solution USE AS A COMPRESS. APPLY COOL TO COLD FOR 20 MINUTES TID  2   • meclizine (ANTIVERT) 12.5 MG tablet take 1 tablet by mouth three times a day if needed for dizziness 30 tablet 1   • Multiple Vitamins-Minerals (V-C FORTE) capsule Take 1 capsule by mouth Daily.  4   • mupirocin (BACTROBAN) 2 % ointment APPLY AA BID  3   • SSD 1 % cream APPLY SPARINGLY QID TO THE HAND  3   • tretinoin (RETIN-A) 0.01 % gel APPLY SPARINGLY HS TO THE FACE  2   • valsartan-hydrochlorothiazide (DIOVAN-HCT) 160-25 MG per tablet take 1 tablet by mouth once daily 90 tablet 1   • ZETIA 10 MG tablet Take 10 mg by mouth. 1 tablet 3 x's a week  0   • Multiple Vitamin (MULTI VITAMIN DAILY) tablet Take  by mouth.       No facility-administered medications prior to visit.        Patient Active Problem List   Diagnosis   • Degeneration of intervertebral disc of lumbar region   • Hyperlipidemia   • Hypertension   • Stenosis of carotid artery   • Pain in joint   • Carotid stenosis   • Muscle spasm   • Transient insomnia   • Psoriatic arthritis (CMS/[facility])   • Psoriasis   • Ludy's node   • Medicare annual wellness visit, subsequent       Advanced Care Planning:  Patient has an advance directive - a copy has not been provided. Have asked the patient to send this to us to add to record    Review of Systems  "  Constitutional: Negative for activity change, chills, fatigue, fever and unexpected weight change.   HENT: Negative for congestion, hearing loss, postnasal drip, sinus pressure, sinus pain, sneezing, sore throat and tinnitus.    Eyes: Negative for photophobia, pain and visual disturbance.   Respiratory: Negative for cough, chest tightness, shortness of breath and wheezing.    Cardiovascular: Negative for chest pain, palpitations and leg swelling.   Gastrointestinal: Negative for abdominal distention, abdominal pain, constipation, diarrhea, nausea and vomiting.   Endocrine: Negative for polydipsia, polyphagia and polyuria.   Genitourinary: Negative for dysuria, frequency, hematuria and urgency.   Neurological: Negative for dizziness, weakness, numbness and headaches.   All other systems reviewed and are negative.      Compared to one year ago, the patient feels her physical health is worse.  Compared to one year ago, the patient feels her mental health is better.    Reviewed chart for potential of high risk medication in the elderly: yes  Reviewed chart for potential of harmful drug interactions in the elderly:yes    Objective         Vitals:    08/22/19 1025   BP: 129/70   BP Location: Left arm   Patient Position: Sitting   Cuff Size: Adult   Pulse: 58   Resp: 16   Temp: 97.9 °F (36.6 °C)   TempSrc: Oral   SpO2: 96%   Weight: 80 kg (176 lb 6.4 oz)   Height: 160 cm (63\")       Body mass index is 31.25 kg/m².  Discussed the patient's BMI with her. The BMI is in the acceptable range.    Physical Exam   Constitutional: She is oriented to person, place, and time. She appears well-developed and well-nourished. No distress.   HENT:   Head: Normocephalic and atraumatic.   Right Ear: External ear normal.   Left Ear: External ear normal.   Nose: Nose normal.   Mouth/Throat: Oropharynx is clear and moist.   Eyes: Pupils are equal, round, and reactive to light.   Neck: Normal range of motion. Neck supple.   No carotid bruits " auscultated   Cardiovascular: Normal rate, regular rhythm, normal heart sounds and intact distal pulses. Exam reveals no gallop and no friction rub.   No murmur heard.  No peripheral edema.  Posterior tib and pedal pulses 2+ and equal bilaterally.   Pulmonary/Chest: Effort normal and breath sounds normal. No stridor. No respiratory distress. She has no wheezes. She has no rales. She exhibits no tenderness.   Lungs are CTA bilaterally   Abdominal: Soft. Bowel sounds are normal. She exhibits no distension. There is no tenderness.   Bowel sounds active x4 quadrants   Musculoskeletal: Normal range of motion.   Neurological: She is alert and oriented to person, place, and time.   Skin: Skin is warm and dry. Capillary refill takes less than 2 seconds. She is not diaphoretic.   Psychiatric: She has a normal mood and affect. Her behavior is normal. Judgment and thought content normal.   Nursing note and vitals reviewed.            Assessment/Plan   Medicare Risks and Personalized Health Plan  CMS Preventative Services Quick Reference  Advance Directive Discussion  Cardiovascular risk  Dementia/Memory   Fall Risk  Immunizations Discussed/Encouraged (specific immunizations; Shingrix )    The above risks/problems have been discussed with the patient.  Pertinent information has been shared with the patient in the After Visit Summary.  Follow up plans and orders are seen below in the Assessment/Plan Section.    Diagnoses and all orders for this visit:    1. Medicare annual wellness visit, subsequent (Primary)    2. Hypovitaminosis D  -     Vitamin D 25 Hydroxy    3. Healthcare maintenance    4. Mixed hyperlipidemia  -     Lipid panel  -     C-reactive protein    5. Essential hypertension  -     CBC & Differential  -     Comprehensive metabolic panel  -     Urinalysis With Microscopic - Urine, Clean Catch  -     C-reactive protein    6. Transient insomnia    7. Imbalance  -     Ambulatory Referral to Physical Therapy    8. Risk  for falls  -     Ambulatory Referral to Physical Therapy      Follow Up:  No Follow-up on file.     An After Visit Summary and PPPS were given to the patient.     -Hypovitaminosis D: Currently taking 2000 units of vitamin D3 daily.  We will check a vitamin D level today.    Healthcare maintenance: We discussed the Shingrix vaccine and she will pursue this at her pharmacy.    Hyperlipidemia: We will check a lipid panel today.  Currently on Zetia 10 mg daily.  She is following with the cholesterol clinic, and needs her labs done today so that she can present them for follow-up next week.    Hypertension: Well controlled today at 129/70.  Continue valsartan-HCTZ 160-25 mg daily and periodic home monitoring.    Transient insomnia: This is stable but she wonders whether she could start taking melatonin for nights that she does not sleep very well.  She is going to start melatonin 3 to 5 mg nightly as needed for sleep.    Imbalance: She requests a referral to physical therapy as she feels that her balance is off lately and she is at increased risk for falls.  Referral is entered for PT.    She would like her C-reactive protein level checked today.  She states that she has struggled with psoriasis in the past and wonders whether this level is elevated.  Patient is educated that this is a nonspecific marker of inflammation, but she would like to check regardless.    We will contact patient with the results of her labs and any further recommendations.  Follow-up PRN and in 6 months with PCP, Dr. orlando for routine health maintenance and follow-up on chronic conditions.

## 2019-08-23 NOTE — PROGRESS NOTES
Please notify patient that her blood count looks stable.  Metabolic panel looks stable as well with no abnormalities in kidney function or liver function.  Cholesterol looks great.  Vitamin D is normal but I would continue her current supplement.  C-reactive protein was normal.  Urine looks clear.

## 2019-08-27 RX ORDER — VALSARTAN AND HYDROCHLOROTHIAZIDE 160; 25 MG/1; MG/1
TABLET ORAL
Qty: 90 TABLET | Refills: 1 | Status: SHIPPED | OUTPATIENT
Start: 2019-08-27 | End: 2019-12-08 | Stop reason: SDUPTHER

## 2019-09-09 RX ORDER — MECLIZINE HCL 12.5 MG/1
12.5 TABLET ORAL 3 TIMES DAILY PRN
Qty: 30 TABLET | Refills: 1 | Status: SHIPPED | OUTPATIENT
Start: 2019-09-09 | End: 2022-07-14

## 2019-12-03 ENCOUNTER — HOSPITAL ENCOUNTER (EMERGENCY)
Facility: HOSPITAL | Age: 79
Discharge: HOME | End: 2019-12-03
Attending: EMERGENCY MEDICINE | Admitting: EMERGENCY MEDICINE

## 2019-12-03 VITALS
DIASTOLIC BLOOD PRESSURE: 75 MMHG | HEART RATE: 64 BPM | SYSTOLIC BLOOD PRESSURE: 152 MMHG | TEMPERATURE: 98.2 F | HEIGHT: 63 IN | RESPIRATION RATE: 16 BRPM | BODY MASS INDEX: 31.77 KG/M2 | OXYGEN SATURATION: 99 % | WEIGHT: 179.3 LBS

## 2019-12-03 DIAGNOSIS — M79.605 PAIN IN BOTH LOWER EXTREMITIES: Primary | ICD-10-CM

## 2019-12-03 DIAGNOSIS — M79.604 PAIN IN BOTH LOWER EXTREMITIES: Primary | ICD-10-CM

## 2019-12-03 PROCEDURE — 99283 EMERGENCY DEPT VISIT LOW MDM: CPT

## 2019-12-03 NOTE — ED TRIAGE NOTES
"Pt states she was having \"problems with her legs and couldn't deal with it by myself\" - states episodes 2 times tonight, but couldn't explain what happened.   "

## 2019-12-03 NOTE — ED PROVIDER NOTES
EMERGENCY DEPARTMENT ENCOUNTER    Room Number:  HALC/C  Date of encounter:  12/3/2019  PCP: Jewel Arteaga Jr., MD  Historian: Patient      HPI:  Chief Complaint: Bilateral leg pain  A complete HPI/ROS/PMH/PSH/SH/FH are unobtainable due to: Nothing    Context: Vikki Robledo is a 79 y.o. female who presents to the ED c/o bilateral lower leg pain that began last night.  She said the pain was on the anterior aspect of her lower legs, she went took a Tylenol and it got better, but then it came back later and she was scared.    She had no other symptoms, no chest pain or shortness of breath, she is been feeling well otherwise, and on arrival by EMS she has no pain at all.      PAST MEDICAL HISTORY  Active Ambulatory Problems     Diagnosis Date Noted   • Degeneration of intervertebral disc of lumbar region 03/24/2016   • Hyperlipidemia 03/24/2016   • Hypertension 03/24/2016   • Stenosis of carotid artery 03/24/2016   • Pain in joint 09/23/2016   • Carotid stenosis 09/23/2016   • Muscle spasm 09/23/2016   • Transient insomnia 09/23/2016   • Psoriatic arthritis (CMS/[facility]) 07/10/2018   • Psoriasis 07/10/2018   • Ludy's node 07/10/2018   • Medicare annual wellness visit, subsequent 07/10/2018     Resolved Ambulatory Problems     Diagnosis Date Noted   • No Resolved Ambulatory Problems     Past Medical History:   Diagnosis Date   • History of breast surgery    • Hypertension          PAST SURGICAL HISTORY  Past Surgical History:   Procedure Laterality Date   • BREAST SURGERY      aspriaton of cyst x3   • CHOLECYSTECTOMY  2007   • HERNIA REPAIR      x2   • TONSILLECTOMY     • UTERINE FIBROID EMBOLIZATION  1970         FAMILY HISTORY  Family History   Problem Relation Age of Onset   • Alzheimer's disease Other    • Pancreatic cancer Other          SOCIAL HISTORY  Social History     Socioeconomic History   • Marital status:      Spouse name: Not on file   • Number of children: Not on file   • Years of  education: Not on file   • Highest education level: Not on file   Occupational History   • Occupation: Retired/teacher    Tobacco Use   • Smoking status: Former Smoker     Types: Cigarettes   Substance and Sexual Activity   • Alcohol use: Yes     Comment: One or more times monthly          ALLERGIES  Amoxicillin; Penicillins; and Statins        REVIEW OF SYSTEMS  Review of Systems     All systems reviewed and negative except for those discussed in HPI.       PHYSICAL EXAM    I have reviewed the triage vital signs and nursing notes.    ED Triage Vitals   Temp Heart Rate Resp BP SpO2   12/03/19 0358 12/03/19 0358 12/03/19 0358 12/03/19 0426 12/03/19 0358   98.2 °F (36.8 °C) 64 16 152/75 99 %      Temp src Heart Rate Source Patient Position BP Location FiO2 (%)   12/03/19 0358 12/03/19 0358 12/03/19 0426 12/03/19 0426 --   Tympanic Monitor Lying Right arm        Physical Exam  GENERAL: Very pleasant no distress  HENT: nares patent  EYES: no scleral icterus  CV: regular rhythm, regular rate  RESPIRATORY: normal effort  ABDOMEN: soft  MUSCULOSKELETAL: no deformity, there is no swelling, erythema or tenderness palpation of the lower extremities or calves.  The skin color is normal, the extremities are warm and distal pulses are intact and symmetric.  NEURO: alert, moves all extremities, follows commands  SKIN: warm, dry        LAB RESULTS  No results found for this or any previous visit (from the past 24 hour(s)).    Ordered the above labs and independently reviewed the results.        RADIOLOGY  No Radiology Exams Resulted Within Past 24 Hours    I ordered the above noted radiological studies. Reviewed by me and discussed with radiologist.  See dictation for official radiology interpretation.      PROCEDURES    Procedures      MEDICATIONS GIVEN IN ER    Medications - No data to display      PROGRESS, DATA ANALYSIS, CONSULTS, AND MEDICAL DECISION MAKING    All labs have been independently reviewed by me.  All radiology  studies have been reviewed by me and discussed with radiologist dictating the report.   EKG's independently viewed and interpreted by me.  Discussion below represents my analysis of pertinent findings related to patient's condition, differential diagnosis, treatment plan and final disposition.        ED Course as of Dec 03 0723   Tue Dec 03, 2019   0723 Gave the patient some reassurance, but no further evaluation is indicated at this time.  [DP]      ED Course User Index  [DP] Jesús Armando MD           AS OF 7:23 AM VITALS:    BP - 152/75  HR - 64  TEMP - 98.2 °F (36.8 °C) (Tympanic)  02 SATS - 99%        DIAGNOSIS  Final diagnoses:   Pain in both lower extremities         DISPOSITION  Discharge           Jesús Armando MD  12/03/19 4634

## 2019-12-03 NOTE — ED NOTES
"Pt states she took \"something like Tylenol\" prior to arrival. Pt denies any leg pain currently.       Gaby Gonzalez RN  12/03/19 0432    "

## 2019-12-03 NOTE — ED NOTES
Pt to triage via ems for c/o bilateral leg pain that woke her up - ems states pt is a&ox4, but pt is foggy about events.      Karen Loomis RN  12/03/19 0354

## 2019-12-09 RX ORDER — VALSARTAN AND HYDROCHLOROTHIAZIDE 160; 25 MG/1; MG/1
TABLET ORAL
Qty: 90 TABLET | Refills: 1 | Status: SHIPPED | OUTPATIENT
Start: 2019-12-09 | End: 2020-02-28 | Stop reason: SDUPTHER

## 2020-02-28 ENCOUNTER — OFFICE VISIT (OUTPATIENT)
Dept: INTERNAL MEDICINE | Facility: CLINIC | Age: 80
End: 2020-02-28

## 2020-02-28 VITALS
WEIGHT: 170 LBS | BODY MASS INDEX: 30.11 KG/M2 | TEMPERATURE: 97.7 F | SYSTOLIC BLOOD PRESSURE: 116 MMHG | HEART RATE: 72 BPM | DIASTOLIC BLOOD PRESSURE: 68 MMHG | OXYGEN SATURATION: 97 %

## 2020-02-28 DIAGNOSIS — E78.2 MIXED HYPERLIPIDEMIA: ICD-10-CM

## 2020-02-28 DIAGNOSIS — E55.9 VITAMIN D DEFICIENCY: ICD-10-CM

## 2020-02-28 DIAGNOSIS — M20.42 HAMMERTOES OF BOTH FEET: ICD-10-CM

## 2020-02-28 DIAGNOSIS — I10 ESSENTIAL HYPERTENSION: ICD-10-CM

## 2020-02-28 DIAGNOSIS — M20.41 HAMMERTOES OF BOTH FEET: ICD-10-CM

## 2020-02-28 DIAGNOSIS — R26.89 BALANCE DISORDER: ICD-10-CM
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
## 2020-02-28 DIAGNOSIS — I49.3 PVC'S (PREMATURE VENTRICULAR CONTRACTIONS): ICD-10-CM

## 2020-02-28 DIAGNOSIS — R41.3 MEMORY CHANGES: Primary | ICD-10-CM

## 2020-02-28 PROCEDURE — 93000 ELECTROCARDIOGRAM COMPLETE: CPT | Performed by: FAMILY MEDICINE

## 2020-02-28 PROCEDURE — G0009 ADMIN PNEUMOCOCCAL VACCINE: HCPCS | Performed by: FAMILY MEDICINE

## 2020-02-28 PROCEDURE — 90732 PPSV23 VACC 2 YRS+ SUBQ/IM: CPT | Performed by: FAMILY MEDICINE

## 2020-02-28 PROCEDURE — 99214 OFFICE O/P EST MOD 30 MIN: CPT | Performed by: FAMILY MEDICINE

## 2020-02-28 RX ORDER — VALSARTAN AND HYDROCHLOROTHIAZIDE 160; 25 MG/1; MG/1
1 TABLET ORAL DAILY
Qty: 90 TABLET | Refills: 3 | Status: SHIPPED | OUTPATIENT
Start: 2020-02-28 | End: 2020-03-03 | Stop reason: SDUPTHER

## 2020-02-28 RX ORDER — FLUOCINOLONE ACETONIDE 0.1 MG/ML
SOLUTION TOPICAL
COMMUNITY
Start: 2020-01-24

## 2020-02-28 NOTE — PROGRESS NOTES
Subjective   Vikki Robledo is a 79 y.o. female.     Chief Complaint   Patient presents with   • Hypertension   • Hyperlipidemia   Memory change, concerned about memory loss, extra heartbeats, residual from fall requiring nasal surgery and further rehab.      History of Present Illness   Very pleasant lady is concerned about dementia ever since a [facility] screening.  She was very terrified of the test and did a better job drawing a clock and fail part of the Mini-Mental Status exam.  She has mother and an uncle with a history of Alzheimer's type dementia.    Otherwise she experienced extrasystoles PVCs perhaps and has seen Dr. Haines in the past.  We will get an EKG today.    Treatment of hypertension hyperlipidemia reviewed.    She has hammertoes on the left foot primarily we will follow-up with podiatry after she does see neurology.    Labs were drawn today in reference to hyperlipidemia hypertension and also extrasystoles.  Also get B12 and folate in reference to memory.    She has history of fall requiring ambulance trip to [facility] and subsequent repair of nasal fracture.      The following portions of the patient's history were reviewed and updated as appropriate: allergies, current medications, past social history and problem list.    Review of Systems    Objective   Vitals:    02/28/20 1329   BP: 116/68   Pulse: 72   Temp: 97.7 °F (36.5 °C)   SpO2: 97%     Physical Exam   Constitutional: She is oriented to person, place, and time. She appears well-developed and well-nourished.   HENT:   Head: Normocephalic and atraumatic.   Right Ear: Tympanic membrane and external ear normal.   Left Ear: Tympanic membrane and external ear normal.   Nose: Nose normal.   Mouth/Throat: Oropharynx is clear and moist.   Eyes: Pupils are equal, round, and reactive to light. Conjunctivae and EOM are normal.   Neck: Normal range of motion. Neck supple. No JVD present. No thyromegaly present.    Cardiovascular: Normal rate, regular rhythm, normal heart sounds and intact distal pulses.  Extrasystoles are present.   Pulmonary/Chest: Effort normal and breath sounds normal.   Abdominal: Soft. Bowel sounds are normal.   Musculoskeletal: Normal range of motion.        Lymphadenopathy:     She has no cervical adenopathy.   Neurological: She is alert and oriented to person, place, and time. No cranial nerve deficit. Coordination normal.   Skin: Skin is warm and dry. No rash noted.   Psychiatric: She has a normal mood and affect. Her behavior is normal. Judgment and thought content normal. Cognition and memory are impaired.   Vitals reviewed.      Assessment/Plan   Problem List Items Addressed This Visit        Cardiovascular and Mediastinum    PVC's (premature ventricular contractions)    Relevant Orders    CBC & Differential    Comprehensive Metabolic Panel    TSH    T4, Free    T3, Free    Lipid Panel With / Chol / HDL Ratio    Vitamin B12    Urinalysis With Microscopic If Indicated (No Culture) - Urine, Clean Catch    Folate    Vitamin D 25 Hydroxy    Ambulatory Referral to Cardiology    Hyperlipidemia    Relevant Orders    CBC & Differential    Comprehensive Metabolic Panel    TSH    T4, Free    T3, Free    Lipid Panel With / Chol / HDL Ratio    Vitamin B12    Urinalysis With Microscopic If Indicated (No Culture) - Urine, Clean Catch    Folate    Vitamin D 25 Hydroxy    Hypertension    Relevant Medications    valsartan-hydrochlorothiazide (DIOVAN-HCT) 160-25 MG per tablet    Other Relevant Orders    CBC & Differential    Comprehensive Metabolic Panel    TSH    T4, Free    T3, Free    Lipid Panel With / Chol / HDL Ratio    Vitamin B12    Urinalysis With Microscopic If Indicated (No Culture) - Urine, Clean Catch    Folate    Vitamin D 25 Hydroxy       Digestive    Vitamin D deficiency    Relevant Orders    CBC & Differential    Comprehensive Metabolic Panel    TSH    T4, Free    T3, Free    Lipid Panel With /  Chol / HDL Ratio    Vitamin B12    Urinalysis With Microscopic If Indicated (No Culture) - Urine, Clean Catch    Folate    Vitamin D 25 Hydroxy       Musculoskeletal and Integument    Hammertoes of both feet    Relevant Orders    CBC & Differential    Comprehensive Metabolic Panel    TSH    T4, Free    T3, Free    Lipid Panel With / Chol / HDL Ratio    Vitamin B12    Urinalysis With Microscopic If Indicated (No Culture) - Urine, Clean Catch    Folate    Vitamin D 25 Hydroxy       Other    Balance disorder    Relevant Orders    CBC & Differential    Comprehensive Metabolic Panel    TSH    T4, Free    T3, Free    Lipid Panel With / Chol / HDL Ratio    Vitamin B12    Urinalysis With Microscopic If Indicated (No Culture) - Urine, Clean Catch    Folate    Vitamin D 25 Hydroxy    Ambulatory Referral to Physical Therapy Evaluate and treat, Vestibular, Neuro; Full weight bearing      Other Visit Diagnoses     Memory changes    -  Primary    Relevant Orders    Ambulatory Referral to Neurology    CBC & Differential    Comprehensive Metabolic Panel    TSH    T4, Free    T3, Free    Lipid Panel With / Chol / HDL Ratio    Vitamin B12    Urinalysis With Microscopic If Indicated (No Culture) - Urine, Clean Catch    Folate    Vitamin D 25 Hydroxy    Fall, sequela        Relevant Orders    Ambulatory Referral to Physical Therapy Evaluate and treat, Vestibular, Neuro; Full weight bearing      Plan: Continue current medications    Referral to cardiology for extrasystoles.    Labs concerning memory hyperlipidemia extrasystoles.    Referral to neurology with history of memory change.    Back to physical therapy at [facility] where she was going before for balance retraining.    Recheck in 4 months.      Pneumovax 23 booster

## 2020-02-28 NOTE — PROGRESS NOTES
Procedure     ECG 12 Lead  Date/Time: 2/28/2020 3:25 PM  Performed by: Jewel Arteaga Jr., MD  Authorized by: Jewel Arteaga Jr., MD   Comparison: compared with previous ECG from 10/12/2017  Similar to previous ECG  Rhythm: sinus bradycardia  Rate: normal  Conduction: conduction normal  T Waves: T waves normal  QRS axis: normal  Other: no other findings    Clinical impression: non-specific ECG

## 2020-02-29 LAB
25(OH)D3+25(OH)D2 SERPL-MCNC: 27.1 NG/ML (ref 30–100)
ALBUMIN SERPL-MCNC: 4.2 G/DL (ref 3.5–5.2)
ALBUMIN/GLOB SERPL: 1.8 G/DL
ALP SERPL-CCNC: 61 U/L (ref 39–117)
ALT SERPL-CCNC: 14 U/L (ref 1–33)
AST SERPL-CCNC: 23 U/L (ref 1–32)
BASOPHILS # BLD AUTO: 0.06 10*3/MM3 (ref 0–0.2)
BASOPHILS NFR BLD AUTO: 1 % (ref 0–1.5)
BILIRUB SERPL-MCNC: 1.2 MG/DL (ref 0.2–1.2)
BUN SERPL-MCNC: 16 MG/DL (ref 8–23)
BUN/CREAT SERPL: 19.5 (ref 7–25)
CALCIUM SERPL-MCNC: 9.1 MG/DL (ref 8.6–10.5)
CHLORIDE SERPL-SCNC: 98 MMOL/L (ref 98–107)
CHOLEST SERPL-MCNC: 176 MG/DL (ref 0–200)
CHOLEST/HDLC SERPL: 2.75 {RATIO}
CO2 SERPL-SCNC: 28.3 MMOL/L (ref 22–29)
CREAT SERPL-MCNC: 0.82 MG/DL (ref 0.57–1)
EOSINOPHIL # BLD AUTO: 0.02 10*3/MM3 (ref 0–0.4)
EOSINOPHIL NFR BLD AUTO: 0.3 % (ref 0.3–6.2)
ERYTHROCYTE [DISTWIDTH] IN BLOOD BY AUTOMATED COUNT: 12.6 % (ref 12.3–15.4)
FOLATE SERPL-MCNC: >20 NG/ML (ref 4.78–24.2)
GLOBULIN SER CALC-MCNC: 2.4 GM/DL
GLUCOSE SERPL-MCNC: 87 MG/DL (ref 65–99)
HCT VFR BLD AUTO: 47.3 % (ref 34–46.6)
HDLC SERPL-MCNC: 64 MG/DL (ref 40–60)
HGB BLD-MCNC: 15.9 G/DL (ref 12–15.9)
IMM GRANULOCYTES # BLD AUTO: 0.02 10*3/MM3 (ref 0–0.05)
IMM GRANULOCYTES NFR BLD AUTO: 0.3 % (ref 0–0.5)
LDLC SERPL CALC-MCNC: 96 MG/DL (ref 0–100)
LYMPHOCYTES # BLD AUTO: 2.22 10*3/MM3 (ref 0.7–3.1)
LYMPHOCYTES NFR BLD AUTO: 36.3 % (ref 19.6–45.3)
MCH RBC QN AUTO: 29.5 PG (ref 26.6–33)
MCHC RBC AUTO-ENTMCNC: 33.6 G/DL (ref 31.5–35.7)
MCV RBC AUTO: 87.8 FL (ref 79–97)
MONOCYTES # BLD AUTO: 0.46 10*3/MM3 (ref 0.1–0.9)
MONOCYTES NFR BLD AUTO: 7.5 % (ref 5–12)
NEUTROPHILS # BLD AUTO: 3.33 10*3/MM3 (ref 1.7–7)
NEUTROPHILS NFR BLD AUTO: 54.6 % (ref 42.7–76)
NRBC BLD AUTO-RTO: 0 /100 WBC (ref 0–0.2)
PLATELET # BLD AUTO: 171 10*3/MM3 (ref 140–450)
POTASSIUM SERPL-SCNC: 3.6 MMOL/L (ref 3.5–5.2)
PROT SERPL-MCNC: 6.6 G/DL (ref 6–8.5)
RBC # BLD AUTO: 5.39 10*6/MM3 (ref 3.77–5.28)
SODIUM SERPL-SCNC: 142 MMOL/L (ref 136–145)
T3FREE SERPL-MCNC: 2.7 PG/ML (ref 2–4.4)
T4 FREE SERPL-MCNC: 1.38 NG/DL (ref 0.93–1.7)
TRIGL SERPL-MCNC: 78 MG/DL (ref 0–150)
TSH SERPL DL<=0.005 MIU/L-ACNC: 0.56 UIU/ML (ref 0.27–4.2)
UNABLE TO VOID: NORMAL
VIT B12 SERPL-MCNC: 590 PG/ML (ref 211–946)
VLDLC SERPL CALC-MCNC: 15.6 MG/DL
WBC # BLD AUTO: 6.11 10*3/MM3 (ref 3.4–10.8)

## 2020-03-03 RX ORDER — VALSARTAN AND HYDROCHLOROTHIAZIDE 160; 25 MG/1; MG/1
1 TABLET ORAL DAILY
Qty: 90 TABLET | Refills: 1 | Status: SHIPPED | OUTPATIENT
Start: 2020-03-03 | End: 2020-08-10

## 2020-06-23 ENCOUNTER — OFFICE VISIT (OUTPATIENT)
Dept: NEUROLOGY | Facility: CLINIC | Age: 80
End: 2020-06-23

## 2020-06-23 VITALS
DIASTOLIC BLOOD PRESSURE: 78 MMHG | BODY MASS INDEX: 29.95 KG/M2 | HEART RATE: 71 BPM | HEIGHT: 63 IN | OXYGEN SATURATION: 97 % | SYSTOLIC BLOOD PRESSURE: 136 MMHG | WEIGHT: 169 LBS

## 2020-06-23 DIAGNOSIS — R41.3 MEMORY LOSS: Primary | ICD-10-CM

## 2020-06-23 PROCEDURE — 99204 OFFICE O/P NEW MOD 45 MIN: CPT | Performed by: PSYCHIATRY & NEUROLOGY

## 2020-06-23 NOTE — PROGRESS NOTES
Subjective:     Patient ID: Vikki Robledo is a 80 y.o. female.    The patient is an 80-year-old both handed female with history of hypertension and hyperlipidemia who presents as a new patient for the evaluation of memory loss.  I reviewed the patient's records.  I reviewed the referring physician's note from February 20, 2020.  He reports that she was having concerns about her memory.  She was concerned about dementia since she had gotten some sort of screening test and did poorly on the Mini-Mental status exam.  It also reports that her mother and uncle had Alzheimer's.  It also reports that the patient has a history of PVCs.  She had a fall and fractured her nose and was referred to physical therapy.  I reviewed the patient's labs.  Her B12 was 590 and her TSH was normal.  She had a head CT in 2017 that was essentially normal.    Patient has been concerned about her memory.  Here by herself today.  Not sure for how long.  Mom had AD.  2 maternal aunts had AD as well.  And a maternal uncle.  4/7 children had it.  No one was diagnosed before the age of 65.  Patient has anxiety when she has to do a lot in a short period of time and has a hard time prioritizing.  Patient does not think that she has problems with forgetting.  Will say the reverse of what she is attempting to say.  Forgets where she puts things and misplaces things.  But has done this since she was a child.  Still drives.  No accidents, tickets, near misses.  Has not gotten lost.  Doesn't drive much.  Lives by self in a house.  Still cooks for self.  Has some trouble keeping up with meds.  Now uses a pill box.  No major changes in personality.  No impulsivity.  No tremor.  Mood is mostly hopefully.  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  Has nocturia 1-2 per night.  Not sure if she snores.  Not refreshed in the am.  No falls in the past 6 months.   Has left the stove on a few times after her mother passed away (that was 5 years ago).  Has not forgotten to pay bills. No hallucinations.  No urinary incontinence.   Got her masters.  Likes reading, magazines, books, flowers, socializing with friends.  Friends have not voiced concerns about her memory.  Not sure of the last book that she read.  Tends to dramatize and worry about things.  Some difficulty recalling names.    Alcohol? 0-2 glasses of wine a month  Smoking? Quite smoking 25-30 years ago  Loss of consciousness/head trauma? no  Seizures/strokes/CNS infections? no  Appetite/Weight change? Some purposeful weight loss.  Dry eyes/mouth? Sometimes dry mouth, but not now.  Drinking more water helped.  Swallowing difficulties? no  POA: no  Living will: no            The following portions of the patient's history were reviewed and updated as appropriate: allergies, current medications, past family history, past medical history, past social history, past surgical history and problem list.    Review of Systems   Constitutional: Negative for activity change, appetite change and fatigue.   HENT: Negative for ear pain, tinnitus and trouble swallowing.    Eyes: Negative for photophobia, pain and visual disturbance.   Respiratory: Negative for cough, chest tightness and shortness of breath.    Cardiovascular: Negative for chest pain, palpitations and leg swelling.   Gastrointestinal: Negative for abdominal pain, nausea and vomiting.   Endocrine: Negative for cold intolerance, heat intolerance and polydipsia.   Musculoskeletal: Positive for gait problem. Negative for back pain and neck pain.   Skin: Negative for color change, rash and wound.   Allergic/Immunologic: Negative for environmental allergies, food allergies and immunocompromised state.   Neurological: Positive for dizziness and numbness. Negative for tremors, seizures, syncope, facial asymmetry, speech difficulty, weakness, light-headedness and headaches.    Hematological: Negative for adenopathy. Does not bruise/bleed easily.   Psychiatric/Behavioral: Positive for confusion and decreased concentration. Negative for agitation, behavioral problems, dysphoric mood, hallucinations, self-injury, sleep disturbance and suicidal ideas. The patient is not nervous/anxious and is not hyperactive.     I reviewed the ROS documented by the MA.  All other systems negative.      Objective:    Neurologic Exam    Physical Exam   **The patient is wearing a mask**  Constitutional:  Vital signs reviewed.  No apparent distress.  Well groomed.  Eyes:  No injection, no icterus.    Respiratory:  Normal effort.  Clear to auscultation bilaterally.  Cardiovascular:  Regular rate with an irregular rhythm.  No murmurs.  No carotid bruits. Symmetric radial pulses.  Musculoskeletal: Normal station.  Gait steady.  Normal arm swing.  Patient able to walk on heels and toes.  Some trouble with tandem gait.  Romberg negative.  Muscle tone and bulk normal in the bilateral upper and lower extremities.  Strength is 5/5 in the bilateral upper and lower extremities proximally and distally unless otherwise specified in the neurological exam.  Skin:  No rashes.  Warm, dry, and intact.  Psychiatric:  Good mood.  Normal affect.    Neurologic:  Mental status-  The patient is alert and oriented to person, place and time. Attention/concentration is within normal limits.  Speech is fluent without dysarthria.  The patient is able to name, repeat and follow complex commands without difficulty.  The patient scored a 22 out of 30 on the [place].  Cranial nerves- Pupils equally round and reactive to light with intact accomodation.  Visual fields intact.  Extraocular movements intact.  Facial sensation intact.   Hearing intact to finger-rub bilaterally.  SCM and trapezius are 5/5 bilaterally.    Motor-  See musculoskeletal above.  No tremor.  Reflexes- 2+ in the bilateral biceps, brachioradialis, patellar and achilles.  Toes  down-going bilaterally.  Sensation- Intact to pinprick and vibration in bilateral upper and lower extremities symmetrically.  Coordination- Intact to finger tapping and heel knee shin bilaterally.   Gait- See musculoskeletal exam above.     Assessment/Plan:    The patient is an 80-year-old both handed female with a history of hypertension hyperlipidemia who presents today for the evaluation of memory loss.    1.  Mild cognitive impairment-The patient's [place] score would put her in the mild cognitive impairment category.  I would recommend further evaluation with neuropsychological testing to see how much this might be related to a mood disorder.  The patient declined at this time.  It is possible that she may have untreated sleep apnea which also could be contributing however she declined a home sleep study as well.  The patient was given memory recommendations.  We can follow her back up in 6 months and repeat testing.  We discussed that approximately 15% of patients older the age of 60 years old have mild cognitive impairment and it could potentially worsen over time and progress to dementia.         Problems Addressed this Visit     None      Visit Diagnoses     Memory loss    -  Primary

## 2020-06-23 NOTE — PATIENT INSTRUCTIONS
**Eat a high fiber diet    **Exercise regularly (physically and mentally)    **Floss daily    **Consider eating yogurt regularly    **Consider drinking green tea    **Limit soda and alcohol

## 2020-06-29 ENCOUNTER — OFFICE VISIT (OUTPATIENT)
Dept: INTERNAL MEDICINE | Facility: CLINIC | Age: 80
End: 2020-06-29

## 2020-06-29 VITALS
HEIGHT: 63 IN | HEART RATE: 56 BPM | DIASTOLIC BLOOD PRESSURE: 82 MMHG | SYSTOLIC BLOOD PRESSURE: 110 MMHG | OXYGEN SATURATION: 98 % | WEIGHT: 166 LBS | BODY MASS INDEX: 29.41 KG/M2

## 2020-06-29 DIAGNOSIS — L40.50 PSORIATIC ARTHRITIS (HCC): ICD-10-CM

## 2020-06-29 DIAGNOSIS — E78.2 MIXED HYPERLIPIDEMIA: ICD-10-CM

## 2020-06-29 DIAGNOSIS — I10 ESSENTIAL HYPERTENSION: Primary | ICD-10-CM

## 2020-06-29 DIAGNOSIS — L40.9 PSORIASIS: ICD-10-CM

## 2020-06-29 DIAGNOSIS — J30.1 NON-SEASONAL ALLERGIC RHINITIS DUE TO POLLEN: ICD-10-CM

## 2020-06-29 DIAGNOSIS — G31.84 MILD COGNITIVE IMPAIRMENT: ICD-10-CM

## 2020-06-29 PROCEDURE — 99213 OFFICE O/P EST LOW 20 MIN: CPT | Performed by: FAMILY MEDICINE

## 2020-06-29 NOTE — PROGRESS NOTES
Subjective   Vikki Robledo is a 80 y.o. female.     Chief Complaint   Patient presents with   • Hyperlipidemia     4 mo follow up   • Hypertension         History of Present Illness   Delightful lady who is done a good job with following her cholesterol the cholesterol clinic.  She is intolerant to statins her cholesterol is currently 189 HDL 54 triglycerides 116  as of May 18.  Blood pressure is been relatively low at 110/82 at this time with heart rate of 56.  She has had a 30-day heart monitor with Dr. Haines not returning any abnormal rhythms so far but final results are pending.    She has routine follow-up with Dr. Kj Ca dermatology for evidence of psoriasis of the scalp.      The following portions of the patient's history were reviewed and updated as appropriate: allergies, current medications, past social history and problem list.    Review of Systems   Constitutional: Negative.    HENT: Negative.    Eyes: Negative.    Respiratory: Negative.    Cardiovascular: Negative.    Gastrointestinal: Negative.    Endocrine: Negative.    Genitourinary: Negative.    Musculoskeletal: Negative.    Skin: Positive for rash.   Allergic/Immunologic: Negative.    Neurological: Negative.    Hematological: Negative.    Psychiatric/Behavioral: Negative.        Objective   Vitals:    06/29/20 1158   BP: 110/82   Pulse: 56   SpO2: 98%     Physical Exam   Constitutional: She is oriented to person, place, and time. She appears well-developed and well-nourished.   HENT:   Head: Normocephalic and atraumatic.   Right Ear: Tympanic membrane and external ear normal.   Left Ear: Tympanic membrane and external ear normal.   Nose: Nose normal.   Mouth/Throat: Oropharynx is clear and moist.   Eyes: Pupils are equal, round, and reactive to light. Conjunctivae and EOM are normal.   Neck: Normal range of motion. Neck supple. No JVD present. No thyromegaly present.   Cardiovascular: Normal rate, regular rhythm, normal  heart sounds and intact distal pulses.   Pulmonary/Chest: Effort normal and breath sounds normal.   Abdominal: Soft. Bowel sounds are normal.   Musculoskeletal: Normal range of motion.   Lymphadenopathy:     She has no cervical adenopathy.   Neurological: She is alert and oriented to person, place, and time. No cranial nerve deficit. Coordination normal.   Skin: Skin is warm and dry. No rash noted.   Psychiatric: She has a normal mood and affect. Her behavior is normal. Judgment and thought content normal.   Vitals reviewed.      Assessment/Plan   Problem List Items Addressed This Visit        Cardiovascular and Mediastinum    Hyperlipidemia    Hypertension - Primary       Musculoskeletal and Integument    Psoriasis      Patient is done remarkably well with weight loss controlling her cholesterol.  And changes which time see her back in 4 months.  Follow-up with dermatology.  Follow-up with cardiology regarding ectopic beats.

## 2020-08-10 RX ORDER — VALSARTAN AND HYDROCHLOROTHIAZIDE 160; 25 MG/1; MG/1
TABLET ORAL
Qty: 90 TABLET | Refills: 1 | Status: SHIPPED | OUTPATIENT
Start: 2020-08-10 | End: 2021-02-05

## 2020-08-26 ENCOUNTER — TELEPHONE (OUTPATIENT)
Dept: INTERNAL MEDICINE | Facility: CLINIC | Age: 80
End: 2020-08-26

## 2020-08-26 NOTE — TELEPHONE ENCOUNTER
Caller: Vikki Dye    Relationship: Self    Best call back number: [phone]    What medication are you requesting: NOT SPECIFIC    What are your current symptoms: POST NASAL DRIP, COUGH     How long have you been experiencing symptoms: UNSURE    Have you had these symptoms before:    [] Yes  [x] No    Have you been treated for these symptoms before:   [] Yes  [x] No    If a prescription is needed, what is your preferred pharmacy and phone number:    [id number] [place] [address] AT [facility] - [phone]  - [phone]   [phone]    Additional notes: PT IS PART OF A STUDY AT [facility] AND RECEIVING A COVID TEST

## 2020-08-27 ENCOUNTER — OFFICE VISIT (OUTPATIENT)
Dept: INTERNAL MEDICINE | Facility: CLINIC | Age: 80
End: 2020-08-27

## 2020-08-27 VITALS — TEMPERATURE: 98.1 F

## 2020-08-27 DIAGNOSIS — J34.89 RHINORRHEA: ICD-10-CM

## 2020-08-27 DIAGNOSIS — R09.82 POSTNASAL DRIP: Primary | ICD-10-CM

## 2020-08-27 PROCEDURE — 99442 PR PHYS/QHP TELEPHONE EVALUATION 11-20 MIN: CPT | Performed by: NURSE PRACTITIONER

## 2020-08-27 RX ORDER — AZELASTINE 1 MG/ML
2 SPRAY, METERED NASAL 2 TIMES DAILY
Qty: 30 ML | Refills: 3 | Status: SHIPPED | OUTPATIENT
Start: 2020-08-27 | End: 2020-09-24 | Stop reason: SDUPTHER

## 2020-08-27 NOTE — PATIENT INSTRUCTIONS
Allergic Rhinitis, Adult  Allergic rhinitis is an allergic reaction that affects the mucous membrane inside the nose. It causes sneezing, a runny or stuffy nose, and the feeling of mucus going down the back of the throat (postnasal drip). Allergic rhinitis can be mild to severe.  There are two types of allergic rhinitis:  · Seasonal. This type is also called hay fever. It happens only during certain seasons.  · Perennial. This type can happen at any time of the year.  What are the causes?  This condition happens when the body's defense system (immune system) responds to certain harmless substances called allergens as though they were germs.   Seasonal allergic rhinitis is triggered by pollen, which can come from grasses, trees, and weeds. Perennial allergic rhinitis may be caused by:  · House dust mites.  · Pet dander.  · Mold spores.  What are the signs or symptoms?  Symptoms of this condition include:  · Sneezing.  · Runny or stuffy nose (nasal congestion).  · Postnasal drip.  · Itchy nose.  · Tearing of the eyes.  · Trouble sleeping.  · Daytime sleepiness.  How is this diagnosed?  This condition may be diagnosed based on:  · Your medical history.  · A physical exam.  · Tests to check for related conditions, such as:  ? Asthma.  ? Pink eye.  ? Ear infection.  ? Upper respiratory infection.  · Tests to find out which allergens trigger your symptoms. These may include skin or blood tests.  How is this treated?  There is no cure for this condition, but treatment can help control symptoms. Treatment may include:  · Taking medicines that block allergy symptoms, such as antihistamines. Medicine may be given as a shot, nasal spray, or pill.  · Avoiding the allergen.  · Desensitization. This treatment involves getting ongoing shots until your body becomes less sensitive to the allergen. This treatment may be done if other treatments do not help.  · If taking medicine and avoiding the allergen does not work, new, stronger  medicines may be prescribed.  Follow these instructions at home:  · Find out what you are allergic to. Common allergens include smoke, dust, and pollen.  · Avoid the things you are allergic to. These are some things you can do to help avoid allergens:  ? Replace carpet with wood, tile, or vinyl lisa. Carpet can trap dander and dust.  ? Do not smoke. Do not allow smoking in your home.  ? Change your heating and air conditioning filter at least once a month.  ? During allergy season:  § Keep windows closed as much as possible.  § Plan outdoor activities when pollen counts are lowest. This is usually during the evening hours.  § When coming indoors, change clothing and shower before sitting on furniture or bedding.  · Take over-the-counter and prescription medicines only as told by your health care provider.  · Keep all follow-up visits as told by your health care provider. This is important.  Contact a health care provider if:  · You have a fever.  · You develop a persistent cough.  · You make whistling sounds when you breathe (you wheeze).  · Your symptoms interfere with your normal daily activities.  Get help right away if:  · You have shortness of breath.  Summary  · This condition can be managed by taking medicines as directed and avoiding allergens.  · Contact your health care provider if you develop a persistent cough or fever.  · During allergy season, keep windows closed as much as possible.  This information is not intended to replace advice given to you by your health care provider. Make sure you discuss any questions you have with your health care provider.  Document Released: 09/12/2002 Document Revised: 11/30/2018 Document Reviewed: 01/25/2018  Elsevier Patient Education © 2020 [employer]

## 2020-08-27 NOTE — PROGRESS NOTES
"Subjective   Vikki Robledo is a 80 y.o. female.   Chief Complaint   Patient presents with   • Nasal Congestion     postnasal drip, rhinorrhea x 2 months       Patient presents via telephone for evaluation of postnasal drip, rhinorrhea.  This is an 80-year-old female patient of Dr. orlando with medical history including lumbar degenerative disc disease, hyperlipidemia, hypertension.  She endorses mild dry cough, postnasal drip, rhinorrhea for the past 1 to 2 months.  This is not progressively worsening but is not alleviating.  The cough is extremely rare and she states that it happens 1-2 times every 3 days.  No production of sputum from the cough.  Denies shortness of breath, chest discomfort, fever or chills.  No known exposure to COVID-19.  She has been taking Airborne supplement over-the-counter since symptoms began.  She exercises daily, walking and denies shortness of breath.  Temp today at home is 98.1.  She occasionally gets postnasal drip and a \"stuffy nose\" with rhinorrhea, clear drainage from her nose.  She is scheduled for COVID-19 test through a study that the [facility] is conducting, this is scheduled for 9/10/2020.  She denies development of any other new issues today.       The following portions of the patient's history were reviewed and updated as appropriate: allergies, current medications, past family history, past medical history, past social history, past surgical history and problem list.    Review of Systems   Constitutional: Negative for activity change, chills, fatigue, fever, unexpected weight gain and unexpected weight loss.   HENT: Positive for congestion, postnasal drip and rhinorrhea. Negative for hearing loss, sinus pressure, sneezing, sore throat, swollen glands and tinnitus.    Eyes: Negative for photophobia, pain and visual disturbance.   Respiratory: Positive for cough. Negative for chest tightness, shortness of breath and wheezing.    Cardiovascular: Negative " for chest pain, palpitations and leg swelling.   Gastrointestinal: Negative for abdominal distention, abdominal pain, constipation, diarrhea, nausea and vomiting.   Endocrine: Negative for polydipsia, polyphagia and polyuria.   Genitourinary: Negative for dysuria, frequency, hematuria and urgency.   Neurological: Negative for dizziness, weakness, numbness and headache.   All other systems reviewed and are negative.      Objective    Vitals:    08/27/20 1118   Temp: 98.1 °F (36.7 °C)       Physical Exam   Constitutional: She is oriented to person, place, and time.   Neurological: She is oriented to person, place, and time.   Psychiatric: Thought content normal.     Current outpatient and discharge medications have been reconciled for the patient.  Reviewed by: DENA Reveles      Assessment/Plan   Vikki Jessica was seen today for nasal congestion.    Diagnoses and all orders for this visit:    Postnasal drip  -     azelastine (ASTELIN) 0.1 % nasal spray; 2 sprays into the nostril(s) as directed by provider 2 (Two) Times a Day. Use in each nostril as directed    Rhinorrhea  -     azelastine (ASTELIN) 0.1 % nasal spray; 2 sprays into the nostril(s) as directed by provider 2 (Two) Times a Day. Use in each nostril as directed    -Symptoms are likely related to seasonal allergies.  She is getting tested for COVID-19 through a study that the [facility] is conducting on 9/10/2020 patient will communicate these results with our office.  I provided Astelin nasal spray which should help with the rhinorrhea and postnasal drip.    -She will follow-up PRN if symptoms persist or worsen and routinely with PCP, Dr. Arteaga.    This visit was conducted via telephone therefore no physical exam was performed.    This visit has been rescheduled as a phone visit to comply with patient safety concerns in accordance with CDC recommendations. Total time of discussion was 14 minutes.

## 2020-09-24 DIAGNOSIS — R09.82 POSTNASAL DRIP: ICD-10-CM

## 2020-09-24 DIAGNOSIS — J34.89 RHINORRHEA: ICD-10-CM

## 2020-09-24 RX ORDER — AZELASTINE 1 MG/ML
2 SPRAY, METERED NASAL 2 TIMES DAILY
Qty: 30 ML | Refills: 3 | Status: SHIPPED | OUTPATIENT
Start: 2020-09-24 | End: 2022-10-11

## 2020-10-29 ENCOUNTER — OFFICE VISIT (OUTPATIENT)
Dept: INTERNAL MEDICINE | Facility: CLINIC | Age: 80
End: 2020-10-29

## 2020-10-29 VITALS
SYSTOLIC BLOOD PRESSURE: 136 MMHG | OXYGEN SATURATION: 99 % | HEART RATE: 59 BPM | DIASTOLIC BLOOD PRESSURE: 64 MMHG | TEMPERATURE: 97.6 F | WEIGHT: 166 LBS | HEIGHT: 63 IN | BODY MASS INDEX: 29.41 KG/M2

## 2020-10-29 DIAGNOSIS — L40.50 PSORIATIC ARTHRITIS (HCC): ICD-10-CM

## 2020-10-29 DIAGNOSIS — I10 ESSENTIAL HYPERTENSION: ICD-10-CM

## 2020-10-29 DIAGNOSIS — E78.2 MIXED HYPERLIPIDEMIA: ICD-10-CM

## 2020-10-29 DIAGNOSIS — L40.9 PSORIASIS: Primary | ICD-10-CM

## 2020-10-29 DIAGNOSIS — N30.00 ACUTE CYSTITIS WITHOUT HEMATURIA: ICD-10-CM

## 2020-10-29 PROCEDURE — 99214 OFFICE O/P EST MOD 30 MIN: CPT | Performed by: FAMILY MEDICINE

## 2020-10-29 RX ORDER — ERGOCALCIFEROL 1.25 MG/1
50000 CAPSULE ORAL
Qty: 8 CAPSULE | Refills: 1 | Status: SHIPPED | OUTPATIENT
Start: 2020-10-29 | End: 2021-02-15

## 2020-10-29 NOTE — PROGRESS NOTES
Subjective   Vikki Robledo is a 80 y.o. female.     Chief Complaint   Patient presents with   • Hyperlipidemia     follow up, discuss flu vaccine and urine   • Hypertension         History of Present Illness   Very pleasant lady who has been cautious concerning the current pandemic.  She has a history of psoriasis and psoriatic arthritis but is now on immune suppressants at this time.    We discussed the active management of hypertension hyperlipidemia.  She is currently on Zetia 10 mg daily plus valsartan hydrochlorothiazide 160/25.    We will recheck a urinalysis with history of recurrent UTI and also get a culture if needed.  She had some Uribel for her previous prescription.    I advised vitamin D supplementation for low vitamin D in the past and we will try giving her a prescription vitamin D 50,000 units weekly for at least 8 weeks.    Otherwise recheck in about 5 months.      The following portions of the patient's history were reviewed and updated as appropriate: allergies, current medications, past social history and problem list.    Review of Systems   Constitutional: Negative.    HENT: Negative.    Eyes: Negative.    Respiratory: Negative.    Cardiovascular: Negative.    Gastrointestinal: Negative.    Endocrine: Negative.    Genitourinary: Negative.    Musculoskeletal: Positive for arthralgias.   Skin: Negative.    Allergic/Immunologic: Negative.    Neurological: Negative.    Hematological: Negative.    Psychiatric/Behavioral: Negative.        Objective   Vitals:    10/29/20 1528   BP: 136/64   Pulse: 59   Temp: 97.6 °F (36.4 °C)   SpO2: 99%     Physical Exam  Vitals signs reviewed.   Constitutional:       Appearance: She is well-developed.   HENT:      Head: Normocephalic and atraumatic.      Right Ear: Tympanic membrane and external ear normal.      Left Ear: Tympanic membrane and external ear normal.   Eyes:      Conjunctiva/sclera: Conjunctivae normal.      Pupils: Pupils are equal, round,  and reactive to light.   Neck:      Musculoskeletal: Normal range of motion and neck supple.      Thyroid: No thyromegaly.      Vascular: No JVD.   Cardiovascular:      Rate and Rhythm: Normal rate and regular rhythm.      Heart sounds: Normal heart sounds.   Pulmonary:      Effort: Pulmonary effort is normal.      Breath sounds: Normal breath sounds.   Abdominal:      General: Bowel sounds are normal.      Palpations: Abdomen is soft.   Musculoskeletal:      Lumbar back: She exhibits decreased range of motion.   Lymphadenopathy:      Cervical: No cervical adenopathy.   Skin:     General: Skin is warm and dry.      Findings: No rash.   Neurological:      Mental Status: She is alert and oriented to person, place, and time.      Cranial Nerves: No cranial nerve deficit.      Coordination: Coordination normal.   Psychiatric:         Behavior: Behavior normal.         Thought Content: Thought content normal.         Judgment: Judgment normal.         Assessment/Plan   Problems Addressed this Visit        Cardiovascular and Mediastinum    Hyperlipidemia    Hypertension       Musculoskeletal and Integument    Psoriatic arthritis (CMS/HCC)    Psoriasis - Primary      Other Visit Diagnoses     Acute cystitis without hematuria        Relevant Orders    Urinalysis With Culture If Indicated -      Diagnoses       Codes Comments    Psoriasis    -  Primary ICD-10-CM: L40.9  ICD-9-CM: 696.1     Psoriatic arthritis (CMS/HCC)     ICD-10-CM: L40.50  ICD-9-CM: 696.0     Mixed hyperlipidemia     ICD-10-CM: E78.2  ICD-9-CM: 272.2     Essential hypertension     ICD-10-CM: I10  ICD-9-CM: 401.9     Acute cystitis without hematuria     ICD-10-CM: N30.00  ICD-9-CM: 595.0

## 2020-10-30 LAB
APPEARANCE UR: CLEAR
BACTERIA #/AREA URNS HPF: NORMAL /HPF
BILIRUB UR QL STRIP: NEGATIVE
COLOR UR: YELLOW
EPI CELLS #/AREA URNS HPF: NORMAL /HPF (ref 0–10)
GLUCOSE UR QL: NEGATIVE
HGB UR QL STRIP: NEGATIVE
KETONES UR QL STRIP: NEGATIVE
LEUKOCYTE ESTERASE UR QL STRIP: NEGATIVE
MICRO URNS: NORMAL
MICRO URNS: NORMAL
MUCOUS THREADS URNS QL MICRO: PRESENT /HPF
NITRITE UR QL STRIP: NEGATIVE
PH UR STRIP: 5.5 [PH] (ref 5–7.5)
PROT UR QL STRIP: NEGATIVE
RBC #/AREA URNS HPF: NORMAL /HPF (ref 0–2)
SP GR UR: 1.01 (ref 1–1.03)
URINALYSIS REFLEX: NORMAL
UROBILINOGEN UR STRIP-MCNC: 0.2 MG/DL (ref 0.2–1)
WBC #/AREA URNS HPF: NORMAL /HPF (ref 0–5)

## 2020-11-03 ENCOUNTER — TELEPHONE (OUTPATIENT)
Dept: INTERNAL MEDICINE | Facility: CLINIC | Age: 80
End: 2020-11-03

## 2020-11-03 NOTE — TELEPHONE ENCOUNTER
Caller: Vikki Dye    Relationship: Self    Best call back number: [phone]    What test was performed: URINALYSIS    When was the test performed: Friday 10/230    Where was the test performed: [facility]    Additional notes: PT SAW RESULTS ON [facility] [employer], BUT DOES NOT UNDERSTAND. PLEASE CALL TO INTERPRET RESULTS FOR PT.

## 2020-11-17 ENCOUNTER — TRANSCRIBE ORDERS (OUTPATIENT)
Dept: ADMINISTRATIVE | Facility: HOSPITAL | Age: 80
End: 2020-11-17

## 2020-11-17 DIAGNOSIS — M54.12 BRACHIAL NEURITIS: Primary | ICD-10-CM

## 2021-02-05 RX ORDER — VALSARTAN AND HYDROCHLOROTHIAZIDE 160; 25 MG/1; MG/1
TABLET ORAL
Qty: 90 TABLET | Refills: 1 | Status: SHIPPED | OUTPATIENT
Start: 2021-02-05 | End: 2021-08-02

## 2021-02-15 RX ORDER — ERGOCALCIFEROL 1.25 MG/1
CAPSULE ORAL
Qty: 8 CAPSULE | Refills: 1 | Status: SHIPPED | OUTPATIENT
Start: 2021-02-15 | End: 2021-03-29 | Stop reason: SDUPTHER

## 2021-03-02 DIAGNOSIS — Z23 IMMUNIZATION DUE: ICD-10-CM

## 2021-03-29 ENCOUNTER — OFFICE VISIT (OUTPATIENT)
Dept: INTERNAL MEDICINE | Facility: CLINIC | Age: 81
End: 2021-03-29

## 2021-03-29 VITALS
WEIGHT: 166 LBS | BODY MASS INDEX: 29.41 KG/M2 | SYSTOLIC BLOOD PRESSURE: 124 MMHG | DIASTOLIC BLOOD PRESSURE: 74 MMHG | OXYGEN SATURATION: 98 % | HEART RATE: 57 BPM | TEMPERATURE: 94.8 F

## 2021-03-29 DIAGNOSIS — L40.50 PSORIATIC ARTHRITIS (HCC): ICD-10-CM

## 2021-03-29 DIAGNOSIS — Z00.00 MEDICARE ANNUAL WELLNESS VISIT, SUBSEQUENT: ICD-10-CM

## 2021-03-29 DIAGNOSIS — G31.84 MILD COGNITIVE IMPAIRMENT: ICD-10-CM

## 2021-03-29 DIAGNOSIS — I10 ESSENTIAL HYPERTENSION: ICD-10-CM

## 2021-03-29 DIAGNOSIS — E55.9 VITAMIN D DEFICIENCY: ICD-10-CM

## 2021-03-29 DIAGNOSIS — E78.2 MIXED HYPERLIPIDEMIA: Primary | ICD-10-CM

## 2021-03-29 PROCEDURE — G0439 PPPS, SUBSEQ VISIT: HCPCS | Performed by: FAMILY MEDICINE

## 2021-03-29 PROCEDURE — 1159F MED LIST DOCD IN RCRD: CPT | Performed by: FAMILY MEDICINE

## 2021-03-29 PROCEDURE — 99214 OFFICE O/P EST MOD 30 MIN: CPT | Performed by: FAMILY MEDICINE

## 2021-03-29 PROCEDURE — 1170F FXNL STATUS ASSESSED: CPT | Performed by: FAMILY MEDICINE

## 2021-03-29 PROCEDURE — 1126F AMNT PAIN NOTED NONE PRSNT: CPT | Performed by: FAMILY MEDICINE

## 2021-03-29 RX ORDER — TRETINOIN 1 MG/G
CREAM TOPICAL
COMMUNITY
Start: 2021-03-10 | End: 2023-01-13

## 2021-03-29 RX ORDER — LANOLIN ALCOHOL/MO/W.PET/CERES
1000 CREAM (GRAM) TOPICAL DAILY
Qty: 90 TABLET | Refills: 3 | Status: SHIPPED | OUTPATIENT
Start: 2021-03-29

## 2021-03-29 RX ORDER — METHENAMINE, SODIUM PHOSPHATE, MONOBASIC, MONOHYDRATE, PHENYL SALICYLATE, METHYLENE BLUE, AND HYOSCYAMINE SULFATE 120; 40.8; 36; 10; .12 MG/1; MG/1; MG/1; MG/1; MG/1
1 CAPSULE ORAL 3 TIMES DAILY
COMMUNITY
Start: 2021-01-27 | End: 2023-04-05

## 2021-03-29 RX ORDER — ERGOCALCIFEROL 1.25 MG/1
50000 CAPSULE ORAL
Qty: 15 CAPSULE | Refills: 3 | Status: SHIPPED | OUTPATIENT
Start: 2021-03-29 | End: 2023-01-13 | Stop reason: SDUPTHER

## 2021-03-29 RX ORDER — ERGOCALCIFEROL 1.25 MG/1
50000 CAPSULE ORAL
Qty: 15 CAPSULE | Refills: 3 | Status: SHIPPED | OUTPATIENT
Start: 2021-03-29 | End: 2021-03-29 | Stop reason: SDUPTHER

## 2021-03-29 NOTE — PROGRESS NOTES
Chief Complaint  Medicare Wellness-subsequent    Subjective          Vikki Robledo presents to [facility]  Very delightful lady who is independent and has been monitored by Dr. Haines cardiology without significant events on event recorder.    She has been eval by Dr. Vogt is an is felt to have mild cognitive impairment although the patient takes exception to that diagnosis.  We will get labs including thyroid panel as well as B12 and reevaluate metabolic reasons for cognitive impairment.    Otherwise she will go to podiatry for hammertoes.    She has been vaccinated twice for COVID-19 I would advise that she can go to her hairdresser I prefer that she not go through the grocery or to the family wedding.      Objective   Vital Signs:   /74 (BP Location: Left arm, Patient Position: Sitting, Cuff Size: Adult)   Pulse 57   Temp 94.8 °F (34.9 °C)   Wt 75.3 kg (166 lb)   SpO2 98%   BMI 29.41 kg/m²     Physical Exam  Vitals reviewed.   Constitutional:       Appearance: She is well-developed.   HENT:      Head: Normocephalic and atraumatic.      Right Ear: Tympanic membrane and external ear normal.      Left Ear: Tympanic membrane and external ear normal.   Eyes:      Conjunctiva/sclera: Conjunctivae normal.      Pupils: Pupils are equal, round, and reactive to light.   Neck:      Thyroid: No thyromegaly.      Vascular: No JVD.   Cardiovascular:      Rate and Rhythm: Normal rate and regular rhythm.      Heart sounds: Normal heart sounds.   Pulmonary:      Effort: Pulmonary effort is normal.      Breath sounds: Normal breath sounds.   Abdominal:      General: Bowel sounds are normal.      Palpations: Abdomen is soft.   Musculoskeletal:         General: Normal range of motion.      Cervical back: Normal range of motion and neck supple.   Lymphadenopathy:      Cervical: No cervical adenopathy.   Skin:     General: Skin is warm and dry.      Findings: No rash.    Neurological:      Mental Status: She is alert and oriented to person, place, and time.      Cranial Nerves: No cranial nerve deficit.      Motor: Weakness present.      Coordination: Coordination normal.      Comments: Slight weakness on getting out of a chair   Psychiatric:         Behavior: Behavior normal.         Thought Content: Thought content normal.         Judgment: Judgment normal.        Result Review :                 Assessment and Plan    Diagnoses and all orders for this visit:    1. Mixed hyperlipidemia (Primary)  Comments:  Double check lipid panel.  Diet control of lipids.  She is doing pretty good job with diet.  Orders:  -     CBC & Differential  -     Comprehensive Metabolic Panel  -     TSH  -     T4, Free  -     T3, Free  -     Vitamin D 25 Hydroxy  -     Vitamin B12  -     Folate  -     Urinalysis With Microscopic If Indicated (No Culture) - Urine, Clean Catch  -     Lipid Panel With / Chol / HDL Ratio    2. Essential hypertension  Comments:  Continue valsartan hydrochlorothiazide 160/25 daily  Orders:  -     CBC & Differential  -     Comprehensive Metabolic Panel  -     TSH  -     T4, Free  -     T3, Free  -     Vitamin D 25 Hydroxy  -     Vitamin B12  -     Folate  -     Urinalysis With Microscopic If Indicated (No Culture) - Urine, Clean Catch  -     Lipid Panel With / Chol / HDL Ratio    3. Vitamin D deficiency  Comments:  Vitamin D3 50,000 units weekly.  Orders:  -     CBC & Differential  -     Comprehensive Metabolic Panel  -     TSH  -     T4, Free  -     T3, Free  -     Vitamin D 25 Hydroxy  -     Vitamin B12  -     Folate  -     Urinalysis With Microscopic If Indicated (No Culture) - Urine, Clean Catch  -     Lipid Panel With / Chol / HDL Ratio    4. Medicare annual wellness visit, subsequent    5. Psoriatic arthritis (CMS/[facility])  Comments:  No new Rx continue to monitor.  Follow-up with rheumatology dermatology if needed  Orders:  -     CBC & Differential  -     Comprehensive  Metabolic Panel  -     TSH  -     T4, Free  -     T3, Free  -     Vitamin D 25 Hydroxy  -     Vitamin B12  -     Folate  -     Urinalysis With Microscopic If Indicated (No Culture) - Urine, Clean Catch  -     Lipid Panel With / Chol / HDL Ratio    6. Mild cognitive impairment  Comments:  Monitor this closely in conjunction with her medications and also labs.  Orders:  -     CBC & Differential  -     Comprehensive Metabolic Panel  -     TSH  -     T4, Free  -     T3, Free  -     Vitamin D 25 Hydroxy  -     Vitamin B12  -     Folate  -     Urinalysis With Microscopic If Indicated (No Culture) - Urine, Clean Catch  -     Lipid Panel With / Chol / HDL Ratio    Other orders  -     Discontinue: vitamin D (ERGOCALCIFEROL) 1.25 MG (76680 UT) capsule capsule; Take 1 capsule by mouth Every 7 (Seven) Days.  Dispense: 15 capsule; Refill: 3  -     vitamin D (ERGOCALCIFEROL) 1.25 MG (87664 UT) capsule capsule; Take 1 capsule by mouth Every 7 (Seven) Days.  Dispense: 15 capsule; Refill: 3  -     vitamin B-12 (CYANOCOBALAMIN) 1000 MCG tablet; Take 1 tablet by mouth Daily.  Dispense: 90 tablet; Refill: 3        Follow Up   Return in about 6 months (around 9/29/2021) for Recheck.  Patient was given instructions and counseling regarding her condition or for health maintenance advice. Please see specific information pulled into the AVS if appropriate.

## 2021-03-29 NOTE — PROGRESS NOTES
The ABCs of the Annual Wellness Visit  Subsequent Medicare Wellness Visit    Chief Complaint   Patient presents with   • Medicare Wellness-subsequent       Subjective   History of Present Illness:  Vikki Robledo is a 80 y.o. female who presents for a Subsequent Medicare Wellness Visit.    HEALTH RISK ASSESSMENT    Recent Hospitalizations:  No hospitalization(s) within the last year.    Current Medical Providers:  Patient Care Team:  Jewel Arteaga Jr., MD as PCP - General (Family Medicine)  Kirsten Reynoso MD as Consulting Physician (Dermatology)    Smoking Status:  Social History     Tobacco Use   Smoking Status Former Smoker   • Types: Cigarettes   Smokeless Tobacco Never Used       Alcohol Consumption:  Social History     Substance and Sexual Activity   Alcohol Use Yes    Comment: One or more times monthly        Depression Screen:   PHQ-2/PHQ-9 Depression Screening 3/29/2021   Little interest or pleasure in doing things 0   Feeling down, depressed, or hopeless 0   Trouble falling or staying asleep, or sleeping too much 1   Feeling tired or having little energy 0   Poor appetite or overeating 0   Feeling bad about yourself - or that you are a failure or have let yourself or your family down 0   Trouble concentrating on things, such as reading the newspaper or watching television 0   Moving or speaking so slowly that other people could have noticed. Or the opposite - being so fidgety or restless that you have been moving around a lot more than usual 0   Thoughts that you would be better off dead, or of hurting yourself in some way 0   Total Score 1   If you checked off any problems, how difficult have these problems made it for you to do your work, take care of things at home, or get along with other people? -       Fall Risk Screen:  STEADI Fall Risk Assessment was completed, and patient is at LOW risk for falls.Assessment completed on:3/29/2021    Health Habits and Functional and Cognitive  Screening:  Functional & Cognitive Status 3/29/2021   Do you have difficulty preparing food and eating? No   Do you have difficulty bathing yourself, getting dressed or grooming yourself? No   Do you have difficulty using the toilet? No   Do you have difficulty moving around from place to place? No   Do you have trouble with steps or getting out of a bed or a chair? No   Current Diet Well Balanced Diet   Dental Exam Not up to date   Eye Exam Not up to date   Exercise (times per week) 5 times per week   Current Exercise Activities Include Walking   Do you need help using the phone?  No   Are you deaf or do you have serious difficulty hearing?  No   Do you need help with transportation? No   Do you need help shopping? No   Do you need help preparing meals?  No   Do you need help with housework?  No   Do you need help with laundry? No   Do you need help taking your medications? No   Do you need help managing money? No   Do you ever drive or ride in a car without wearing a seat belt? No   Have you felt unusual stress, anger or loneliness in the last month? Yes   Who do you live with? Alone   If you need help, do you have trouble finding someone available to you? No   Have you been bothered in the last four weeks by sexual problems? No   Do you have difficulty concentrating, remembering or making decisions? Yes         Does the patient have evidence of cognitive impairment? Yes    Asprin use counseling:Does not need ASA (and currently is not on it)    Age-appropriate Screening Schedule:  Refer to the list below for future screening recommendations based on patient's age, sex and/or medical conditions. Orders for these recommended tests are listed in the plan section. The patient has been provided with a written plan.    Health Maintenance   Topic Date Due   • DXA SCAN  Never done   • ZOSTER VACCINE (1 of 2) Never done   • INFLUENZA VACCINE  08/01/2020   • LIPID PANEL  05/18/2021   • TDAP/TD VACCINES (2 - Td) 04/22/2028           The following portions of the patient's history were reviewed and updated as appropriate: allergies, current medications, past family history, past medical history, past social history, past surgical history and problem list.    Outpatient Medications Prior to Visit   Medication Sig Dispense Refill   • azelastine (ASTELIN) 0.1 % nasal spray 2 sprays into the nostril(s) as directed by provider 2 (Two) Times a Day. Use in each nostril as directed 30 mL 3   • ciclopirox (LOPROX) 1 % shampoo Shampoo into scalp and let sit on for 5 minutes 3 times per week.  0   • fluocinolone (SYNALAR) 0.01 % external solution APPLY D TO THE SCALP     • meclizine (ANTIVERT) 12.5 MG tablet Take 1 tablet by mouth 3 (Three) Times a Day As Needed for dizziness. 30 tablet 1   • Multiple Vitamins-Minerals (V-C FORTE) capsule Take 1 capsule by mouth Daily.  4   • tretinoin (RETIN-A) 0.1 % cream      • uribel (URO-MP) 118 MG capsule capsule Take 1 capsule by mouth 3 (Three) Times a Day.     • valsartan-hydrochlorothiazide (DIOVAN-HCT) 160-25 MG per tablet TAKE 1 TABLET DAILY 90 tablet 1   • ZETIA 10 MG tablet Take 10 mg by mouth. 1 tablet 3 x's a week  0   • tretinoin (RETIN-A) 0.01 % gel APPLY SPARINGLY HS TO THE FACE  2   • vitamin D (ERGOCALCIFEROL) 1.25 MG (30186 UT) capsule capsule TAKE 1 CAPSULE BY MOUTH EVERY 7 DAYS 8 capsule 1     No facility-administered medications prior to visit.       Patient Active Problem List   Diagnosis   • Degeneration of intervertebral disc of lumbar region   • Hyperlipidemia   • Hypertension   • Stenosis of carotid artery   • Pain in joint   • Carotid stenosis   • Muscle spasm   • Transient insomnia   • Psoriatic arthritis (CMS/[facility])   • Psoriasis   • Ludy's node   • Medicare annual wellness visit, subsequent   • Vitamin D deficiency   • Hammertoes of both feet   • Balance disorder   • PVC's (premature ventricular contractions)   • Mild cognitive impairment       Advanced Care Planning:  ACP  discussion was held with the patient during this visit. Patient does not have an advance directive, information provided.    Review of Systems    Compared to one year ago, the patient feels her physical health is the same.  Compared to one year ago, the patient feels her mental health is the same.    Reviewed chart for potential of high risk medication in the elderly: not applicable  Reviewed chart for potential of harmful drug interactions in the elderly:not applicable    Objective         Vitals:    03/29/21 1121   BP: 124/74   BP Location: Left arm   Patient Position: Sitting   Cuff Size: Adult   Pulse: 57   Temp: 94.8 °F (34.9 °C)   SpO2: 98%   Weight: 75.3 kg (166 lb)   PainSc: 0-No pain       Body mass index is 29.41 kg/m².  Discussed the patient's BMI with her. The BMI is above average; no BMI management plan is appropriate..    Physical Exam          Assessment/Plan   Medicare Risks and Personalized Health Plan  CMS Preventative Services Quick Reference  Cardiovascular risk  Fall Risk    The above risks/problems have been discussed with the patient.  Pertinent information has been shared with the patient in the After Visit Summary.  Follow up plans and orders are seen below in the Assessment/Plan Section.    Diagnoses and all orders for this visit:    1. Mixed hyperlipidemia (Primary)    2. Essential hypertension    3. Vitamin D deficiency    4. Medicare annual wellness visit, subsequent    5. Psoriatic arthritis (CMS/[facility])    6. Mild cognitive impairment    Other orders  -     Discontinue: vitamin D (ERGOCALCIFEROL) 1.25 MG (75213 UT) capsule capsule; Take 1 capsule by mouth Every 7 (Seven) Days.  Dispense: 15 capsule; Refill: 3  -     vitamin D (ERGOCALCIFEROL) 1.25 MG (17772 UT) capsule capsule; Take 1 capsule by mouth Every 7 (Seven) Days.  Dispense: 15 capsule; Refill: 3  -     vitamin B-12 (CYANOCOBALAMIN) 1000 MCG tablet; Take 1 tablet by mouth Daily.  Dispense: 90 tablet; Refill: 3      Follow Up:  No  follow-ups on file.     An After Visit Summary and PPPS were given to the patient.

## 2021-03-30 LAB
25(OH)D3+25(OH)D2 SERPL-MCNC: 66 NG/ML (ref 30–100)
ALBUMIN SERPL-MCNC: 4.3 G/DL (ref 3.5–5.2)
ALBUMIN/GLOB SERPL: 2 G/DL
ALP SERPL-CCNC: 68 U/L (ref 39–117)
ALT SERPL-CCNC: 13 U/L (ref 1–33)
APPEARANCE UR: CLEAR
AST SERPL-CCNC: 20 U/L (ref 1–32)
BACTERIA #/AREA URNS HPF: ABNORMAL /HPF
BASOPHILS # BLD AUTO: 0.04 10*3/MM3 (ref 0–0.2)
BASOPHILS NFR BLD AUTO: 0.7 % (ref 0–1.5)
BILIRUB SERPL-MCNC: 1 MG/DL (ref 0–1.2)
BILIRUB UR QL STRIP: NEGATIVE
BUN SERPL-MCNC: 16 MG/DL (ref 8–23)
BUN/CREAT SERPL: 19 (ref 7–25)
CALCIUM SERPL-MCNC: 9.3 MG/DL (ref 8.6–10.5)
CASTS URNS MICRO: ABNORMAL
CHLORIDE SERPL-SCNC: 97 MMOL/L (ref 98–107)
CHOLEST SERPL-MCNC: 174 MG/DL (ref 0–200)
CHOLEST/HDLC SERPL: 2.85 {RATIO}
CO2 SERPL-SCNC: 28.9 MMOL/L (ref 22–29)
COLOR UR: ABNORMAL
CREAT SERPL-MCNC: 0.84 MG/DL (ref 0.57–1)
EOSINOPHIL # BLD AUTO: 0.02 10*3/MM3 (ref 0–0.4)
EOSINOPHIL NFR BLD AUTO: 0.4 % (ref 0.3–6.2)
EPI CELLS #/AREA URNS HPF: ABNORMAL /HPF
ERYTHROCYTE [DISTWIDTH] IN BLOOD BY AUTOMATED COUNT: 12.6 % (ref 12.3–15.4)
FOLATE SERPL-MCNC: >20 NG/ML (ref 4.78–24.2)
GLOBULIN SER CALC-MCNC: 2.2 GM/DL
GLUCOSE SERPL-MCNC: 91 MG/DL (ref 65–99)
GLUCOSE UR QL: NEGATIVE
HCT VFR BLD AUTO: 46.7 % (ref 34–46.6)
HDLC SERPL-MCNC: 61 MG/DL (ref 40–60)
HGB BLD-MCNC: 15.8 G/DL (ref 12–15.9)
HGB UR QL STRIP: ABNORMAL
IMM GRANULOCYTES # BLD AUTO: 0.01 10*3/MM3 (ref 0–0.05)
IMM GRANULOCYTES NFR BLD AUTO: 0.2 % (ref 0–0.5)
KETONES UR QL STRIP: NEGATIVE
LDLC SERPL CALC-MCNC: 101 MG/DL (ref 0–100)
LEUKOCYTE ESTERASE UR QL STRIP: ABNORMAL
LYMPHOCYTES # BLD AUTO: 1.96 10*3/MM3 (ref 0.7–3.1)
LYMPHOCYTES NFR BLD AUTO: 34.3 % (ref 19.6–45.3)
MCH RBC QN AUTO: 30.2 PG (ref 26.6–33)
MCHC RBC AUTO-ENTMCNC: 33.8 G/DL (ref 31.5–35.7)
MCV RBC AUTO: 89.1 FL (ref 79–97)
MONOCYTES # BLD AUTO: 0.54 10*3/MM3 (ref 0.1–0.9)
MONOCYTES NFR BLD AUTO: 9.5 % (ref 5–12)
NEUTROPHILS # BLD AUTO: 3.14 10*3/MM3 (ref 1.7–7)
NEUTROPHILS NFR BLD AUTO: 54.9 % (ref 42.7–76)
NITRITE UR QL STRIP: NEGATIVE
NRBC BLD AUTO-RTO: 0 /100 WBC (ref 0–0.2)
PH UR STRIP: 7 [PH] (ref 5–8)
PLATELET # BLD AUTO: 170 10*3/MM3 (ref 140–450)
POTASSIUM SERPL-SCNC: 3.8 MMOL/L (ref 3.5–5.2)
PROT SERPL-MCNC: 6.5 G/DL (ref 6–8.5)
PROT UR QL STRIP: ABNORMAL
RBC # BLD AUTO: 5.24 10*6/MM3 (ref 3.77–5.28)
RBC #/AREA URNS HPF: ABNORMAL /HPF
SODIUM SERPL-SCNC: 138 MMOL/L (ref 136–145)
SP GR UR: 1.01 (ref 1–1.03)
T3FREE SERPL-MCNC: 3.2 PG/ML (ref 2–4.4)
T4 FREE SERPL-MCNC: 1.34 NG/DL (ref 0.93–1.7)
TRIGL SERPL-MCNC: 65 MG/DL (ref 0–150)
TSH SERPL DL<=0.005 MIU/L-ACNC: 1.14 UIU/ML (ref 0.27–4.2)
UROBILINOGEN UR STRIP-MCNC: ABNORMAL MG/DL
VIT B12 SERPL-MCNC: 746 PG/ML (ref 211–946)
VLDLC SERPL CALC-MCNC: 12 MG/DL (ref 5–40)
WBC # BLD AUTO: 5.71 10*3/MM3 (ref 3.4–10.8)
WBC #/AREA URNS HPF: ABNORMAL /HPF

## 2021-04-22 ENCOUNTER — TELEPHONE (OUTPATIENT)
Dept: INTERNAL MEDICINE | Facility: CLINIC | Age: 81
End: 2021-04-22

## 2021-04-22 NOTE — TELEPHONE ENCOUNTER
Please let her know that her labs look great.  Cholesterol is in good range.  Chemistry panel is totally normal with normal liver and kidneys no diabetes B12 and folic acid are normal thyroid panel normal.  Vitamin D is excellent level.  No abnormalities of the urinalysis.

## 2021-04-22 NOTE — TELEPHONE ENCOUNTER
Caller: Vikki Dye    Relationship: Self    Best call back number: [phone]    Caller requesting test results: YES    What test was performed: LAB    When was the test performed: 3/29/21    Where was the test performed: IN OFFICE    Additional notes: PATIENT WOULD LIKE A CALL BACK WITH LAB RESULTS.

## 2021-04-30 ENCOUNTER — TELEPHONE (OUTPATIENT)
Dept: INTERNAL MEDICINE | Facility: CLINIC | Age: 81
End: 2021-04-30

## 2021-07-13 DIAGNOSIS — R92.1 BREAST CALCIFICATIONS: Primary | ICD-10-CM

## 2021-07-13 DIAGNOSIS — R92.8 ABNORMAL MAMMOGRAM OF RIGHT BREAST: ICD-10-CM

## 2021-07-14 ENCOUNTER — TELEPHONE (OUTPATIENT)
Dept: INTERNAL MEDICINE | Facility: CLINIC | Age: 81
End: 2021-07-14

## 2021-07-14 NOTE — TELEPHONE ENCOUNTER
Caller: Vikki Dye    Relationship: Self    Best call back number:  [phone]     What orders are you requesting (i.e. lab or imaging): ADDITIONAL TESTING AFTER MAMMOGRAM     In what timeframe would the patient need to come in: ASAP     Where will you receive your lab/imaging services: [facility]     Additional notes: PLEASE CALL PATIENT ONCE ORDERS HAVE BEEN PLACED .

## 2021-07-14 NOTE — TELEPHONE ENCOUNTER
Amanda sunshine order to [facility] again as patient states order has not been received.

## 2021-07-31 DIAGNOSIS — I10 ESSENTIAL HYPERTENSION: Primary | ICD-10-CM

## 2021-08-02 RX ORDER — VALSARTAN AND HYDROCHLOROTHIAZIDE 160; 25 MG/1; MG/1
TABLET ORAL
Qty: 90 TABLET | Refills: 1 | Status: SHIPPED | OUTPATIENT
Start: 2021-08-02 | End: 2022-01-25

## 2021-08-09 DIAGNOSIS — R92.1 BREAST CALCIFICATIONS: Primary | ICD-10-CM

## 2021-08-10 ENCOUNTER — TELEPHONE (OUTPATIENT)
Dept: INTERNAL MEDICINE | Facility: CLINIC | Age: 81
End: 2021-08-10

## 2021-08-10 NOTE — TELEPHONE ENCOUNTER
Caller: [facility]    Relationship: Provider    Best call back number: [phone]    What orders are you requesting (i.e. lab or imaging): STERO GUIDED BREAST BIOPSY RIGHT BREAST     In what timeframe would the patient need to come in: ASAP, THE PATIENT'S APPOINTMENT IS AT 0900 ON 08/11/2021    Where will you receive your lab/imaging services: PRE-ACCESS DEPARTMENT/ CENTRAL SCHEDULING    Additional notes:EVANGELISTA FROM [facility] CENTRALIZED SCHEDULING/ PRE-ACCESS DEPARTMENT IS CALLING TO REQUEST THAT THE ORDERS FOR THE PATIENT'S RIGHT BREAST BIOPSY BE RESENT. EVANGELISTA STATES THAT FAX WOULD WORK BEST  FAX: [phone]

## 2021-08-10 NOTE — TELEPHONE ENCOUNTER
EVANGELISTA WITH [facility]  PRE ACCESS DEPARTMENT IS ASKING FOR  ORDERS TO BE FAXED OVER TO THEM.  IT IS STERO GUIDING BREAST BIOPSY RIGHT.  PLEASE FAX [phone]

## 2021-08-11 DIAGNOSIS — R92.1 BREAST CALCIFICATION, RIGHT: Primary | ICD-10-CM

## 2021-08-11 NOTE — PROGRESS NOTES
Incorrect order was placed for Stereotactic breast bx, per tech at [facility]    Order was corrected sent back over to [facility] for patient to have done.

## 2021-08-16 ENCOUNTER — TELEPHONE (OUTPATIENT)
Dept: INTERNAL MEDICINE | Facility: CLINIC | Age: 81
End: 2021-08-16

## 2021-09-28 ENCOUNTER — OFFICE VISIT (OUTPATIENT)
Dept: INTERNAL MEDICINE | Facility: CLINIC | Age: 81
End: 2021-09-28

## 2021-09-28 VITALS
SYSTOLIC BLOOD PRESSURE: 128 MMHG | TEMPERATURE: 97.9 F | BODY MASS INDEX: 28.53 KG/M2 | HEIGHT: 63 IN | HEART RATE: 58 BPM | WEIGHT: 161 LBS | DIASTOLIC BLOOD PRESSURE: 80 MMHG | OXYGEN SATURATION: 98 %

## 2021-09-28 DIAGNOSIS — M54.50 CHRONIC LEFT-SIDED LOW BACK PAIN WITHOUT SCIATICA: ICD-10-CM

## 2021-09-28 DIAGNOSIS — G89.29 CHRONIC LEFT-SIDED LOW BACK PAIN WITHOUT SCIATICA: ICD-10-CM

## 2021-09-28 DIAGNOSIS — I10 ESSENTIAL HYPERTENSION: Primary | ICD-10-CM

## 2021-09-28 DIAGNOSIS — Z71.85 VACCINE COUNSELING: ICD-10-CM

## 2021-09-28 DIAGNOSIS — E78.2 MIXED HYPERLIPIDEMIA: ICD-10-CM

## 2021-09-28 DIAGNOSIS — R26.89 IMBALANCE: ICD-10-CM

## 2021-09-28 PROCEDURE — 99214 OFFICE O/P EST MOD 30 MIN: CPT | Performed by: FAMILY MEDICINE

## 2021-09-28 NOTE — PROGRESS NOTES
"Chief Complaint  Hyperlipidemia and Hypertension    Subjective          Vikki Robledo presents to [facility]  Very pleasant lady with a number of concerns.  Symptomatic she is doing overall very well she is has an infection presumed fungal under the breast and she is waiting dermatology's call.    Otherwise we discussed getting the Shingrix vaccination as well as a booster for COVID-19 and a flu shot high-dose which she will get at the pharmacy.  She is on Zetia four times weekly and the valsartan hydrochlorothiazide 160/12.5 daily.      Objective   Vital Signs:   /80   Pulse 58   Temp 97.9 °F (36.6 °C)   Ht 160 cm (63\")   Wt 73 kg (161 lb)   SpO2 98%   BMI 28.52 kg/m²     Physical Exam  Vitals reviewed.   Constitutional:       Appearance: She is well-developed.   HENT:      Head: Normocephalic and atraumatic.      Right Ear: Tympanic membrane and external ear normal.      Left Ear: Tympanic membrane and external ear normal.      Nose: Nose normal.   Eyes:      Conjunctiva/sclera: Conjunctivae normal.      Pupils: Pupils are equal, round, and reactive to light.   Neck:      Thyroid: No thyromegaly.      Vascular: No JVD.   Cardiovascular:      Rate and Rhythm: Normal rate and regular rhythm.      Heart sounds: Normal heart sounds.   Pulmonary:      Effort: Pulmonary effort is normal.      Breath sounds: Normal breath sounds.   Abdominal:      General: Bowel sounds are normal.      Palpations: Abdomen is soft.   Musculoskeletal:      Cervical back: Normal range of motion and neck supple.      Lumbar back: Decreased range of motion.   Lymphadenopathy:      Cervical: No cervical adenopathy.   Skin:     General: Skin is warm and dry.      Findings: No rash.   Neurological:      Mental Status: She is alert and oriented to person, place, and time.      Cranial Nerves: No cranial nerve deficit.      Coordination: Coordination abnormal.   Psychiatric:         Behavior: " Behavior normal.         Thought Content: Thought content normal.         Judgment: Judgment normal.        Result Review :                 Assessment and Plan    Diagnoses and all orders for this visit:    1. Essential hypertension (Primary)  Comments:  Valsartan hydrochlorothiazide 160/12.5 daily.    2. Vaccine counseling    3. Mixed hyperlipidemia  Comments:  Zetia four times weekly.    4. Chronic left-sided low back pain without sciatica  Comments:  Discussed prior physical therapy    5. Imbalance  Comments:  Discussed exercises for balance and precautions for falling prevention        Follow Up   Return in about 6 months (around 3/28/2022) for Medicare Wellness, Recheck.  Patient was given instructions and counseling regarding her condition or for health maintenance advice. Please see specific information pulled into the AVS if appropriate.

## 2021-12-28 ENCOUNTER — HOSPITAL ENCOUNTER (OUTPATIENT)
Dept: GENERAL RADIOLOGY | Facility: HOSPITAL | Age: 81
Discharge: HOME | End: 2021-12-28

## 2021-12-28 ENCOUNTER — OFFICE VISIT (OUTPATIENT)
Dept: INTERNAL MEDICINE | Facility: CLINIC | Age: 81
End: 2021-12-28

## 2021-12-28 VITALS
BODY MASS INDEX: 28.17 KG/M2 | DIASTOLIC BLOOD PRESSURE: 72 MMHG | HEART RATE: 58 BPM | SYSTOLIC BLOOD PRESSURE: 124 MMHG | TEMPERATURE: 98.2 F | OXYGEN SATURATION: 99 % | HEIGHT: 63 IN | WEIGHT: 159 LBS

## 2021-12-28 DIAGNOSIS — M79.602 LEFT ARM PAIN: ICD-10-CM

## 2021-12-28 DIAGNOSIS — M51.36 DDD (DEGENERATIVE DISC DISEASE), LUMBAR: Primary | ICD-10-CM

## 2021-12-28 DIAGNOSIS — R09.82 POSTNASAL DRIP: ICD-10-CM

## 2021-12-28 PROCEDURE — 99213 OFFICE O/P EST LOW 20 MIN: CPT | Performed by: NURSE PRACTITIONER

## 2021-12-28 PROCEDURE — 72110 X-RAY EXAM L-2 SPINE 4/>VWS: CPT

## 2021-12-28 PROCEDURE — 73060 X-RAY EXAM OF HUMERUS: CPT

## 2021-12-28 NOTE — PROGRESS NOTES
"Chief Complaint  Back Pain (back and left leg pain) and Leg Pain    Subjective          Vikki Robledo presents to [facility]  Patient presents for evaluation of back pain.  This is an 81-year-old female patient of Dr. Arteaga with medical history including lumbar degenerative disc disease, hyperlipidemia and hypertension.    She has a history of lumbar degenerative disc disease and was seen by [facility] with neurosurgery most recently 10/6/2016.  She has seen physical therapy in the past but states that she does not wish to proceed with PT at this time.  She is having some ongoing lower back pain, no recent mechanism of injury and denies any radiation of pain down the legs.  The pain is worse with prolonged immobility and stiffness increases as well.  She has been trying to increase her walking a bit after not having been as active recently.    She endorses left arm pain with certain positions; this has been present for the past 2 to 3 months.  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  No erythema, heat or swelling is present.    She has some postnasal drip that has been present for about 1 week that feels \"different\" than her regular sinus symptoms.  She denies any known exposure to COVID-19, states that the only place she would have had and potential exposure would have been the grocery store.  She is requesting a Covid test today.    Denies development of any other new issues today.      Objective   Vital Signs:   /72   Pulse 58   Temp 98.2 °F (36.8 °C)   Ht 160 cm (63\")   Wt 72.1 kg (159 lb)   SpO2 99%   BMI 28.17 kg/m²     Physical Exam  Vitals and nursing note reviewed.   Constitutional:       General: She is not in acute distress.     Appearance: Normal appearance. She is well-developed. She is not " ill-appearing, toxic-appearing or diaphoretic.   HENT:      Head: Normocephalic and atraumatic.      Right Ear: External ear normal.      Left Ear: External ear normal.   Eyes:      Pupils: Pupils are equal, round, and reactive to light.   Neck:      Vascular: No carotid bruit.   Cardiovascular:      Rate and Rhythm: Normal rate and regular rhythm.      Pulses: Normal pulses.      Heart sounds: Normal heart sounds.      Comments: No peripheral edema  Pulmonary:      Effort: Pulmonary effort is normal. No respiratory distress.      Breath sounds: Normal breath sounds. No stridor. No wheezing, rhonchi or rales.   Chest:      Chest wall: No tenderness.   Abdominal:      Palpations: Abdomen is soft.   Musculoskeletal:      Right upper arm: Normal. No tenderness or bony tenderness.      Left upper arm: Normal. No bony tenderness.      Cervical back: Normal range of motion and neck supple. No rigidity or tenderness.      Lumbar back: No swelling, deformity, tenderness or bony tenderness. Decreased range of motion.   Lymphadenopathy:      Cervical: No cervical adenopathy.   Skin:     General: Skin is warm and dry.      Capillary Refill: Capillary refill takes less than 2 seconds.   Neurological:      General: No focal deficit present.      Mental Status: She is alert and oriented to person, place, and time. Mental status is at baseline.   Psychiatric:         Mood and Affect: Mood normal.         Behavior: Behavior normal.         Thought Content: Thought content normal.        Result Review :   The following data was reviewed by: DENA Hoyos on 12/28/2021:  Common labs    Common Labsle 3/29/21 3/29/21 3/29/21    1228 1228 1228   Glucose  91    BUN  16    Creatinine  0.84    eGFR Non  Am  65    eGFR  Am  79    Sodium  138    Potassium  3.8    Chloride  97 (A)    Calcium  9.3    Total Protein  6.5    Albumin  4.30    Total Bilirubin  1.0    Alkaline Phosphatase  68    AST (SGOT)  20    ALT (SGPT)  13     WBC 5.71     Hemoglobin 15.8     Hematocrit 46.7 (A)     Platelets 170     Total Cholesterol   174   Triglycerides   65   HDL Cholesterol   61 (A)   LDL Cholesterol    101 (A)   (A) Abnormal value       Comments are available for some flowsheets but are not being displayed.           Current outpatient and discharge medications have been reconciled for the patient.  Reviewed by: DENA Hoyos           Assessment and Plan    Diagnoses and all orders for this visit:    1. DDD (degenerative disc disease), lumbar (Primary)  -     XR Spine Lumbar 4+ View (In Office)    2. Postnasal drip  -     COVID-19,[facility] ROUTINE, NP/OP SWAB IN TRANSPORT MEDIA OR ESWAB 72 HR TAT - Swab, Anterior nasal    3. Left arm pain  -     XR Humerus Left (In Office)      Lumbar DDD: She does not wish to proceed with PT at this time due to the pandemic.  Intermittent heat is recommended.  Updating an x-ray of the lumbar spine today.  If needed, based upon the results we can get her to orthopedic/neuro back to her neurosurgery group for further evaluation.    Testing for COVID-19 based on her new postnasal drip symptom and per her request.    Left arm pain: Checking x-ray left humerus due to prolonged pain.  Tylenol as needed for pain relief.    We will contact patient with x-ray results and further recommendations.  Follow-up as needed and routinely with PCP, Dr. Arteaga.    Follow Up   No follow-ups on file.  Patient was given instructions and counseling regarding her condition or for health maintenance advice. Please see specific information pulled into the AVS if appropriate.

## 2021-12-29 LAB
LABCORP SARS-COV-2, NAA 2 DAY TAT: NORMAL
SARS-COV-2 RNA RESP QL NAA+PROBE: NOT DETECTED

## 2021-12-30 NOTE — PROGRESS NOTES
Please call and let the patient know that her x-ray of the left arm is showing some degenerative changes that could be correlated with impingement of her rotator cuff.  This could be contributing to her arm discomfort.  I recommend a consultation with an orthopedic specialist and if she is open to this referral I can get it placed.  Additionally, her lumbar spine x-ray is showing a compression fracture at L4/some degenerative changes.  I recommend a consultation with an orthopedic/spine specialist such as Dr. Baron or her neurosurgery group.  Please let me know if she is open to these referrals and I will place them.

## 2022-01-04 DIAGNOSIS — M51.36 DDD (DEGENERATIVE DISC DISEASE), LUMBAR: ICD-10-CM

## 2022-01-04 DIAGNOSIS — M79.602 LEFT ARM PAIN: ICD-10-CM

## 2022-01-04 DIAGNOSIS — R93.7 ABNORMAL X-RAY OF BONE: Primary | ICD-10-CM

## 2022-01-06 ENCOUNTER — TELEPHONE (OUTPATIENT)
Dept: INTERNAL MEDICINE | Facility: CLINIC | Age: 82
End: 2022-01-06

## 2022-01-06 NOTE — TELEPHONE ENCOUNTER
Caller: Vikki Dye    Relationship to patient: Self    Best call back number: [phone]*    Patient is needing the information for the office of the the rheumatologist that she was referred to. Please advise.

## 2022-01-07 NOTE — TELEPHONE ENCOUNTER
She recently saw Lori with referral to orthopedics.  I do not know about any referral to rheumatology.  If she wants to see rheumatology for psoriatic arthritis we could send her to Dr. Trinh.

## 2022-01-25 DIAGNOSIS — I10 ESSENTIAL HYPERTENSION: ICD-10-CM

## 2022-01-25 RX ORDER — VALSARTAN AND HYDROCHLOROTHIAZIDE 160; 25 MG/1; MG/1
TABLET ORAL
Qty: 90 TABLET | Refills: 3 | Status: SHIPPED | OUTPATIENT
Start: 2022-01-25 | End: 2023-01-20

## 2022-02-16 ENCOUNTER — OFFICE VISIT (OUTPATIENT)
Dept: INTERNAL MEDICINE | Facility: CLINIC | Age: 82
End: 2022-02-16

## 2022-02-16 VITALS
TEMPERATURE: 97.5 F | SYSTOLIC BLOOD PRESSURE: 137 MMHG | WEIGHT: 158 LBS | BODY MASS INDEX: 28 KG/M2 | OXYGEN SATURATION: 98 % | DIASTOLIC BLOOD PRESSURE: 79 MMHG | HEIGHT: 63 IN | HEART RATE: 58 BPM | RESPIRATION RATE: 17 BRPM

## 2022-02-16 DIAGNOSIS — L98.9 SKIN LESION: Primary | ICD-10-CM

## 2022-02-16 DIAGNOSIS — M79.675 TOE PAIN, LEFT: ICD-10-CM

## 2022-02-16 PROCEDURE — 99213 OFFICE O/P EST LOW 20 MIN: CPT | Performed by: NURSE PRACTITIONER

## 2022-02-16 NOTE — PROGRESS NOTES
"Chief Complaint  Toe Pain (Pt presents here today with L leg pain, L foot/toe pain.) and Leg Pain    Subjective          Vikki Robledo presents to [facility]  Pt presents for evaluation of a skin abnormality with some associated pain. This is an 81 YOF pt of Dr. Arteaga with HTN, psoriatic arthritis hx, HLD. She endorses pain between her left 5th toe and left 4th toe off an on for the past couple of months. She has put some neosporin between the toes on the affected area and a couple of times has \"picked a scab out of there that keeps growing.\" No surrounding erythema, drainage or radiation of the pain. Denies development of other associated issues today.      Objective   Vital Signs:   /79 (BP Location: Right arm, Patient Position: Sitting, Cuff Size: Adult)   Pulse 58   Temp 97.5 °F (36.4 °C)   Resp 17   Ht 160 cm (63\")   Wt 71.7 kg (158 lb)   SpO2 98%   BMI 27.99 kg/m²     Physical Exam  Vitals and nursing note reviewed.   Constitutional:       Appearance: She is well-developed.   HENT:      Head: Atraumatic.   Eyes:      Pupils: Pupils are equal, round, and reactive to light.   Cardiovascular:      Rate and Rhythm: Normal rate and regular rhythm.   Pulmonary:      Effort: Pulmonary effort is normal.      Breath sounds: Normal breath sounds.   Abdominal:      General: Bowel sounds are normal.      Palpations: Abdomen is soft.   Musculoskeletal:         General: Normal range of motion.      Cervical back: Normal range of motion and neck supple.   Feet:      Left foot:      Skin integrity: Callus present.      Comments: Between left fourth and fifth toe small fissure with associated scab/callous. No erythema or drainage.  Skin:     General: Skin is warm and dry.      Capillary Refill: Capillary refill takes less than 2 seconds.   Neurological:      General: No focal deficit present.      Mental Status: She is alert and oriented to person, place, and time. Mental " status is at baseline.   Psychiatric:         Mood and Affect: Mood normal.         Behavior: Behavior normal.         Thought Content: Thought content normal.         Judgment: Judgment normal.        Result Review :   The following data was reviewed by: DENA Hoyos on 02/16/2022:  Common labs    Common Labsle 3/29/21 3/29/21 3/29/21    1228 1228 1228   Glucose  91    BUN  16    Creatinine  0.84    eGFR Non  Am  65    eGFR  Am  79    Sodium  138    Potassium  3.8    Chloride  97 (A)    Calcium  9.3    Total Protein  6.5    Albumin  4.30    Total Bilirubin  1.0    Alkaline Phosphatase  68    AST (SGOT)  20    ALT (SGPT)  13    WBC 5.71     Hemoglobin 15.8     Hematocrit 46.7 (A)     Platelets 170     Total Cholesterol   174   Triglycerides   65   HDL Cholesterol   61 (A)   LDL Cholesterol    101 (A)   (A) Abnormal value       Comments are available for some flowsheets but are not being displayed.           Current outpatient and discharge medications have been reconciled for the patient.  Reviewed by: DENA Hoyos           Assessment and Plan    Diagnoses and all orders for this visit:    1. Skin lesion (Primary)  -     mupirocin (BACTROBAN) 2 % ointment; Apply 1 application topically to the appropriate area as directed 3 (Three) Times a Day.  Dispense: 15 g; Refill: 0    2. Toe pain, left      Treating the area with mupirocin ointment to be applied BID to TID- she will keep area cool and dry.     Follow up if unresolved in 3 weeks, may need podiatry referral.     Follow up PRN in the meantime.     Follow Up   Return for Next scheduled follow up.  Patient was given instructions and counseling regarding her condition or for health maintenance advice. Please see specific information pulled into the AVS if appropriate.

## 2022-03-04 ENCOUNTER — OFFICE VISIT (OUTPATIENT)
Dept: ORTHOPEDIC SURGERY | Facility: CLINIC | Age: 82
End: 2022-03-04

## 2022-03-04 VITALS — WEIGHT: 158 LBS | BODY MASS INDEX: 28 KG/M2 | TEMPERATURE: 96.2 F | HEIGHT: 63 IN

## 2022-03-04 DIAGNOSIS — R52 PAIN: Primary | ICD-10-CM

## 2022-03-04 DIAGNOSIS — M19.019 ARTHRITIS OF SHOULDER: ICD-10-CM

## 2022-03-04 PROCEDURE — 99203 OFFICE O/P NEW LOW 30 MIN: CPT | Performed by: ORTHOPAEDIC SURGERY

## 2022-03-04 PROCEDURE — 73030 X-RAY EXAM OF SHOULDER: CPT | Performed by: ORTHOPAEDIC SURGERY

## 2022-03-04 NOTE — PROGRESS NOTES
Patient: Vikki Robledo    YOB: 1940    Medical Record Number: [id number]    Chief Complaints:  Left shoulder pain    History of Present Illness:     81 y.o. female patient who presents with a complaint of left shoulder pain.  She reports that the symptoms first started about 2 months ago when lifting a cat in a cage.  Her pain is mild to moderate, intermittent and aching. Her pain is worse with certain movements.  Again, the symptoms are not constant.  She denies any alleviating factors.  She denies any shooting pain down the arm, weakness, numbness or paresthesias.     Allergies:   Allergies   Allergen Reactions   • Amoxicillin Other (See Comments)     Skin redness   • Penicillins Other (See Comments)     Cannot remember reaction   • Statins Other (See Comments)     Myalgias       Home Medications:    Current Outpatient Medications:   •  azelastine (ASTELIN) 0.1 % nasal spray, 2 sprays into the nostril(s) as directed by provider 2 (Two) Times a Day. Use in each nostril as directed, Disp: 30 mL, Rfl: 3  •  ciclopirox (LOPROX) 1 % shampoo, Shampoo into scalp and let sit on for 5 minutes 3 times per week., Disp: , Rfl: 0  •  fluocinolone (SYNALAR) 0.01 % external solution, APPLY D TO THE SCALP, Disp: , Rfl:   •  meclizine (ANTIVERT) 12.5 MG tablet, Take 1 tablet by mouth 3 (Three) Times a Day As Needed for dizziness., Disp: 30 tablet, Rfl: 1  •  Multiple Vitamins-Minerals (V-C FORTE) capsule, Take 1 capsule by mouth Daily., Disp: , Rfl: 4  •  mupirocin (BACTROBAN) 2 % ointment, Apply 1 application topically to the appropriate area as directed 3 (Three) Times a Day., Disp: 15 g, Rfl: 0  •  tretinoin (RETIN-A) 0.1 % cream, , Disp: , Rfl:   •  uribel (URO-MP) 118 MG capsule capsule, Take 1 capsule by mouth 3 (Three) Times a Day., Disp: , Rfl:   •  valsartan-hydrochlorothiazide (DIOVAN-HCT) 160-25 MG per tablet, TAKE 1 TABLET DAILY, Disp: 90 tablet, Rfl: 3  •  vitamin B-12 (CYANOCOBALAMIN) 1000  MCG tablet, Take 1 tablet by mouth Daily., Disp: 90 tablet, Rfl: 3  •  vitamin D (ERGOCALCIFEROL) 1.25 MG (65396 UT) capsule capsule, Take 1 capsule by mouth Every 7 (Seven) Days., Disp: 15 capsule, Rfl: 3  •  ZETIA 10 MG tablet, Take 10 mg by mouth. 1 tablet 3 x's a week, Disp: , Rfl: 0    Past Medical History:   Diagnosis Date   • Arthritis    • Cataracts, bilateral    • History of breast surgery     aspriaton of cyst x3   • Hyperlipidemia    • Hypertension    • Memory loss        Past Surgical History:   Procedure Laterality Date   • BREAST SURGERY      aspriaton of cyst x3   • CATARACT EXTRACTION, BILATERAL     • CHOLECYSTECTOMY  2007   • HERNIA REPAIR      x2   • TONSILLECTOMY     • UTERINE FIBROID EMBOLIZATION  1970       Social History     Occupational History   • Occupation: Retired/teacher    Tobacco Use   • Smoking status: Former Smoker     Types: Cigarettes   • Smokeless tobacco: Never Used   Vaping Use   • Vaping Use: Never used   Substance and Sexual Activity   • Alcohol use: Yes     Comment: One or more times monthly    • Drug use: Never   • Sexual activity: Defer      Social History     Social History Narrative   • Not on file       Family History   Problem Relation Age of Onset   • Alzheimer's disease Other    • Pancreatic cancer Other    • Alzheimer's disease Mother    • Pancreatic cancer Father    • Alzheimer's disease Maternal Aunt    • Alzheimer's disease Maternal Uncle    • Alzheimer's disease Paternal Aunt        Review of Systems:      Constitutional: Denies fever, shaking or chills   Eyes: Denies change in visual acuity   HEENT: Denies nasal congestion or sore throat   Respiratory: Denies cough or shortness of breath   Cardiovascular: Denies chest pain or edema  Endocrine: Denies tremors, palpitations, intolerance of heat or cold, polyuria, polydipsia.  GI: Denies abdominal pain, nausea, vomiting, bloody stools or diarrhea  : Denies frequency, urgency, incontinence, retention, or  "nocturia.  Musculoskeletal: Denies numbness, tingling or loss of motor function   Integument: Denies rash, lesion or ulceration   Neurologic: Denies headache or focal weakness, deficits  Heme: Denies spontaneous or excessive bleeding, epistaxis, hematuria, melena, fatigue, enlarged or tender lymph nodes.      All other pertinent positives and negatives as noted above in HPI.    Physical Exam:   81 y.o. female  Vitals:    03/04/22 1450   Temp: 96.2 °F (35.7 °C)   Weight: 71.7 kg (158 lb)   Height: 160 cm (63\")       General:  Patient is awake and alert.  Appears in no acute distress or discomfort.    Psych:  Affect and demeanor are appropriate.    Eyes:  Conjunctiva and sclera appear grossly normal.  Eyes track well and EOM seem to be intact.    Ears:  No gross abnormalities.  Hearing adequate for the exam.    Cardiovascular:  Regular rate and rhythm.    Lungs:  Good chest expansion.  Breathing unlabored.    Spine:  Neck appears grossly normal.  No palpable masses or adenopathy.  Good motion.  Spurling's maneuver is negative for any shoulder or arm symptoms.    Extremities:  Left shoulder is examined.  Skin is benign.  No obvious gross abnormalities.  No palpable masses or adenopathy.  Moderate tenderness noted over anterior glenohumeral joint and rotator interval.  Motion is to 160 degrees of forward elevation, 50 degrees external rotation, T12 internal rotation.  No instability.  Rotator cuff strength seems to be well preserved but the exam is limited due to discomfort with resisted active motion.  Good motor and sensory function in her lower arm and hand.  Palpable radial pulse.  Brisk capillary refill.         Radiology:  AP, scapular Y, and axillary views of the left shoulder are ordered by myself and reviewed to evaluate the patient's complaint.  No comparison films are immediately available.  The x-rays show moderate glenohumeral osteoarthritis with joint space narrowing, osteophyte formation, and subchondral " sclerosis.  The acromiohumeral interval measures normal.    Assessment/Plan:  Left shoulder osteoarthritis    We discussed treatment options in detail including conservative treatment versus surgical options.  Regarding conservative treatment, we discussed appropriate activity modifications, anti-inflammatories, injections, and physical therapy.  We also discussed the option of an arthroplasty and all that would entail.  I have recommended that we start with a conservative approach and the patient agrees.    She has acknowledged understanding of the information and elected for an injection.  The risk, benefits and alternatives were discussed.  She consented and the injection was performed as described below.  Going forward, she will follow-up with me on an as-needed basis.    Eran Broderick MD    03/04/2022    CC to Jewel Arteaga MD

## 2022-03-23 ENCOUNTER — OFFICE VISIT (OUTPATIENT)
Dept: ORTHOPEDIC SURGERY | Facility: CLINIC | Age: 82
End: 2022-03-23

## 2022-03-23 VITALS — BODY MASS INDEX: 26.75 KG/M2 | TEMPERATURE: 96.2 F | WEIGHT: 151 LBS | HEIGHT: 63 IN

## 2022-03-23 DIAGNOSIS — M54.50 LUMBAR BACK PAIN: Primary | ICD-10-CM

## 2022-03-23 PROCEDURE — 99213 OFFICE O/P EST LOW 20 MIN: CPT | Performed by: ORTHOPAEDIC SURGERY

## 2022-03-25 NOTE — PROGRESS NOTES
New patient or new problem visit    CC: Chronic low back pain    HPI: She complains of chronic low back pain and had some more severe pain radiating to the cab of her left leg physical therapy helped immensely and she is back to walking now and is much better.  No balance difficulties bowel or bladder complaints    PFSH: See attached    ROS: As above    PE: BMI is 26.8.  Good strength on exam straight leg raise is negative sensation appears intact.    XRAY: Plain film x-rays of the lumbar spine demonstrate slight indentation of the L4 endplate which is quite unremarkable and multilevel degenerative change slight scoliosis.  Reviewed the radiologist report and they suggested possibility of fracture at that level.    Other: n/a    Impression: She certainly could have had a fracture but that is healed now and she looks to be doing great.  I would leave well enough alone.    Plan: Walk for exercise continue physical therapy and follow-up as needed

## 2022-03-28 ENCOUNTER — TELEPHONE (OUTPATIENT)
Dept: INTERNAL MEDICINE | Facility: CLINIC | Age: 82
End: 2022-03-28

## 2022-04-04 ENCOUNTER — OFFICE VISIT (OUTPATIENT)
Dept: INTERNAL MEDICINE | Facility: CLINIC | Age: 82
End: 2022-04-04

## 2022-04-04 VITALS
TEMPERATURE: 97.8 F | WEIGHT: 153 LBS | SYSTOLIC BLOOD PRESSURE: 129 MMHG | DIASTOLIC BLOOD PRESSURE: 70 MMHG | BODY MASS INDEX: 27.11 KG/M2 | HEART RATE: 55 BPM | OXYGEN SATURATION: 98 % | RESPIRATION RATE: 16 BRPM | HEIGHT: 63 IN

## 2022-04-04 DIAGNOSIS — R73.09 ELEVATED GLUCOSE: ICD-10-CM

## 2022-04-04 DIAGNOSIS — G89.29 CHRONIC RIGHT UPPER QUADRANT PAIN: ICD-10-CM

## 2022-04-04 DIAGNOSIS — I10 PRIMARY HYPERTENSION: ICD-10-CM

## 2022-04-04 DIAGNOSIS — E55.9 VITAMIN D DEFICIENCY: ICD-10-CM

## 2022-04-04 DIAGNOSIS — M25.512 LEFT SHOULDER PAIN, UNSPECIFIED CHRONICITY: ICD-10-CM

## 2022-04-04 DIAGNOSIS — M51.36 DEGENERATION OF INTERVERTEBRAL DISC OF LUMBAR REGION: Primary | ICD-10-CM

## 2022-04-04 DIAGNOSIS — E78.2 MIXED HYPERLIPIDEMIA: ICD-10-CM

## 2022-04-04 DIAGNOSIS — M85.88 OSTEOPENIA OF LUMBAR SPINE: ICD-10-CM

## 2022-04-04 DIAGNOSIS — Z00.00 MEDICARE ANNUAL WELLNESS VISIT, SUBSEQUENT: ICD-10-CM

## 2022-04-04 DIAGNOSIS — F51.02 TRANSIENT INSOMNIA: ICD-10-CM

## 2022-04-04 DIAGNOSIS — R10.11 CHRONIC RIGHT UPPER QUADRANT PAIN: ICD-10-CM

## 2022-04-04 DIAGNOSIS — L40.9 PSORIASIS: ICD-10-CM

## 2022-04-04 PROCEDURE — 1159F MED LIST DOCD IN RCRD: CPT | Performed by: FAMILY MEDICINE

## 2022-04-04 PROCEDURE — G0439 PPPS, SUBSEQ VISIT: HCPCS | Performed by: FAMILY MEDICINE

## 2022-04-04 PROCEDURE — 1126F AMNT PAIN NOTED NONE PRSNT: CPT | Performed by: FAMILY MEDICINE

## 2022-04-04 PROCEDURE — 1170F FXNL STATUS ASSESSED: CPT | Performed by: FAMILY MEDICINE

## 2022-04-04 PROCEDURE — 99214 OFFICE O/P EST MOD 30 MIN: CPT | Performed by: FAMILY MEDICINE

## 2022-04-04 NOTE — PROGRESS NOTES
"Chief Complaint  Medicare Wellness-subsequent (AWV)    Subjective          Vikki Robledo presents to [facility]  Ms. Robledo is here for the recheck and also Medicare wellness.  She had number of items she went over including trip to orthopedics for left arm rotator cuff tendinitis with improvement also saw Dr. Felder and she is not having surgery or contemplation of back surgery.  She did go through some degree of rehab and seems to be pretty stable.  She has trouble crossing the left leg over the right leg when she is seated.    Otherwise she has some subtle right upper quadrant discomfort with absence of gallbladder.  Given history of elevated liver enzymes we will go ahead and do the CT abdomen and pelvis.    She has some sleeping trouble and does states she is a self-proclaimed night owl.    She saw Dr. Haines in the past for cardiac work-up is not seeing regularly.    I felt like she would not be able to get off of her blood pressure medicine in the future.    She has evidence of the second toe left hammertoe with deformities between the second toe and great toe.  She is contemplating podiatry surgery.      Objective   Vital Signs:   /70 (BP Location: Right arm, Patient Position: Sitting, Cuff Size: Adult)   Pulse 55   Temp 97.8 °F (36.6 °C)   Resp 16   Ht 160 cm (63\")   Wt 69.4 kg (153 lb)   SpO2 98%   BMI 27.10 kg/m²            Physical Exam  Vitals reviewed.   Constitutional:       Appearance: She is well-developed.   HENT:      Head: Normocephalic and atraumatic.      Right Ear: Tympanic membrane and external ear normal.      Left Ear: Tympanic membrane and external ear normal.   Eyes:      Conjunctiva/sclera: Conjunctivae normal.      Pupils: Pupils are equal, round, and reactive to light.   Neck:      Thyroid: No thyromegaly.      Vascular: No JVD.   Cardiovascular:      Rate and Rhythm: Normal rate and regular rhythm.      Heart sounds: Normal heart " sounds.   Pulmonary:      Effort: Pulmonary effort is normal.      Breath sounds: Normal breath sounds.   Abdominal:      General: Bowel sounds are normal.      Palpations: Abdomen is soft.   Musculoskeletal:         General: Normal range of motion.      Cervical back: Normal range of motion and neck supple.        Feet:    Lymphadenopathy:      Cervical: No cervical adenopathy.   Skin:     General: Skin is warm and dry.      Findings: No rash.   Neurological:      Mental Status: She is alert and oriented to person, place, and time.      Cranial Nerves: No cranial nerve deficit.      Coordination: Coordination normal.   Psychiatric:         Behavior: Behavior normal.         Thought Content: Thought content normal.         Judgment: Judgment normal.        Result Review :                 Assessment and Plan    Diagnoses and all orders for this visit:    1. Degeneration of intervertebral disc of lumbar region (Primary)  Comments:  Appears to be self-limited and functional    2. Chronic right upper quadrant pain  Comments:  CT abdomen and pelvis with IV contrast  Orders:  -     CT Abdomen Pelvis With Contrast; Future    3. Left shoulder pain, unspecified chronicity  Comments:  Improved after orthopedic injection    4. Osteopenia of lumbar spine  Comments:  DEXA scan  Orders:  -     DEXA Bone Density Axial; Future  -     Sedimentation Rate  -     C-reactive Protein  -     Comprehensive Metabolic Panel  -     CBC & Differential  -     Lipid Panel With / Chol / HDL Ratio  -     Hemoglobin A1c  -     Folate  -     Vitamin B12  -     Vitamin D 25 Hydroxy  -     Urinalysis With Microscopic If Indicated (No Culture) - Urine, Clean Catch  -     TSH  -     T4, Free    5. Mixed hyperlipidemia  -     Sedimentation Rate  -     C-reactive Protein  -     Comprehensive Metabolic Panel  -     CBC & Differential  -     Lipid Panel With / Chol / HDL Ratio  -     Hemoglobin A1c  -     Folate  -     Vitamin B12  -     Vitamin D 25  Hydroxy  -     Urinalysis With Microscopic If Indicated (No Culture) - Urine, Clean Catch  -     TSH  -     T4, Free    6. Primary hypertension  -     Sedimentation Rate  -     C-reactive Protein  -     Comprehensive Metabolic Panel  -     CBC & Differential  -     Lipid Panel With / Chol / HDL Ratio  -     Hemoglobin A1c  -     Folate  -     Vitamin B12  -     Vitamin D 25 Hydroxy  -     Urinalysis With Microscopic If Indicated (No Culture) - Urine, Clean Catch  -     TSH  -     T4, Free    7. Vitamin D deficiency  -     Sedimentation Rate  -     C-reactive Protein  -     Comprehensive Metabolic Panel  -     CBC & Differential  -     Lipid Panel With / Chol / HDL Ratio  -     Hemoglobin A1c  -     Folate  -     Vitamin B12  -     Vitamin D 25 Hydroxy  -     Urinalysis With Microscopic If Indicated (No Culture) - Urine, Clean Catch  -     TSH  -     T4, Free    8. Medicare annual wellness visit, subsequent    9. Psoriasis  -     Sedimentation Rate  -     C-reactive Protein  -     Comprehensive Metabolic Panel  -     CBC & Differential  -     Lipid Panel With / Chol / HDL Ratio  -     Hemoglobin A1c  -     Folate  -     Vitamin B12  -     Vitamin D 25 Hydroxy  -     Urinalysis With Microscopic If Indicated (No Culture) - Urine, Clean Catch  -     TSH  -     T4, Free    10. Transient insomnia  Comments:  Discussion of sleep hygiene    11. Elevated glucose   -     Hemoglobin A1c        Follow Up   Return in about 6 months (around 10/4/2022) for Recheck.  Patient was given instructions and counseling regarding her condition or for health maintenance advice. Please see specific information pulled into the AVS if appropriate.

## 2022-04-04 NOTE — PROGRESS NOTES
The ABCs of the Annual Wellness Visit  Subsequent Medicare Wellness Visit    Chief Complaint   Patient presents with   • Medicare Wellness-subsequent     AWV      Subjective    History of Present Illness:  Vikki Robledo is a 81 y.o. female who presents for a Subsequent Medicare Wellness Visit.    The following portions of the patient's history were reviewed and   updated as appropriate: allergies, current medications, past family history, past medical history, past social history, past surgical history and problem list.    Compared to one year ago, the patient feels her physical   health is the same.    Compared to one year ago, the patient feels her mental   health is the same.    Recent Hospitalizations:  She was not admitted to the hospital during the last year.       Current Medical Providers:  Patient Care Team:  Jewel Arteaga MD as PCP - General (Family Medicine)  Kirsten Reynoso MD as Consulting Physician (Dermatology)    Outpatient Medications Prior to Visit   Medication Sig Dispense Refill   • azelastine (ASTELIN) 0.1 % nasal spray 2 sprays into the nostril(s) as directed by provider 2 (Two) Times a Day. Use in each nostril as directed 30 mL 3   • ciclopirox (LOPROX) 1 % shampoo Shampoo into scalp and let sit on for 5 minutes 3 times per week.  0   • fluocinolone (SYNALAR) 0.01 % external solution APPLY D TO THE SCALP     • meclizine (ANTIVERT) 12.5 MG tablet Take 1 tablet by mouth 3 (Three) Times a Day As Needed for dizziness. 30 tablet 1   • Multiple Vitamins-Minerals (V-C FORTE) capsule Take 1 capsule by mouth Daily.  4   • mupirocin (BACTROBAN) 2 % ointment Apply 1 application topically to the appropriate area as directed 3 (Three) Times a Day. 15 g 0   • tretinoin (RETIN-A) 0.1 % cream      • uribel (URO-MP) 118 MG capsule capsule Take 1 capsule by mouth 3 (Three) Times a Day.     • valsartan-hydrochlorothiazide (DIOVAN-HCT) 160-25 MG per tablet TAKE 1 TABLET DAILY 90 tablet 3   • vitamin  "B-12 (CYANOCOBALAMIN) 1000 MCG tablet Take 1 tablet by mouth Daily. 90 tablet 3   • vitamin D (ERGOCALCIFEROL) 1.25 MG (86699 UT) capsule capsule Take 1 capsule by mouth Every 7 (Seven) Days. 15 capsule 3   • ZETIA 10 MG tablet Take 10 mg by mouth. 1 tablet 3 x's a week  0     No facility-administered medications prior to visit.       No opioid medication identified on active medication list. I have reviewed chart for other potential  high risk medication/s and harmful drug interactions in the elderly.          Aspirin is not on active medication list.  Aspirin use is not indicated based on review of current medical condition/s. Risk of harm outweighs potential benefits.  .    Patient Active Problem List   Diagnosis   • Degeneration of intervertebral disc of lumbar region   • Hyperlipidemia   • Hypertension   • Stenosis of carotid artery   • Pain in joint   • Carotid stenosis   • Muscle spasm   • Transient insomnia   • Psoriatic arthritis (HCC)   • Psoriasis   • Ludy's node   • Medicare annual wellness visit, subsequent   • Vitamin D deficiency   • Hammertoes of both feet   • Balance disorder   • PVC's (premature ventricular contractions)   • Mild cognitive impairment     Advance Care Planning  Advance Directive is not on file.  ACP discussion was held with the patient during this visit. Patient has an advance directive (not in EMR), copy requested.          Objective    Vitals:    04/04/22 1444   BP: 129/70   BP Location: Right arm   Patient Position: Sitting   Cuff Size: Adult   Pulse: 55   Resp: 16   Temp: 97.8 °F (36.6 °C)   SpO2: 98%   Weight: 69.4 kg (153 lb)   Height: 160 cm (63\")   PainSc: 0-No pain     BMI Readings from Last 1 Encounters:   04/04/22 27.10 kg/m²   BMI is above normal parameters. Recommendations include: exercise counseling    Does the patient have evidence of cognitive impairment? No    Physical Exam            HEALTH RISK ASSESSMENT    Smoking Status:  Social History     Tobacco Use "   Smoking Status Former Smoker   • Types: Cigarettes   Smokeless Tobacco Never Used     Alcohol Consumption:  Social History     Substance and Sexual Activity   Alcohol Use Yes    Comment: One or more times monthly      Fall Risk Screen:    JUNEADI Fall Risk Assessment was completed, and patient is at LOW risk for falls.Assessment completed on:4/4/2022    Depression Screening:  PHQ-2/PHQ-9 Depression Screening 4/4/2022   Retired Total Score -   Little Interest or Pleasure in Doing Things 0-->not at all   Feeling Down, Depressed or Hopeless 1-->several days   PHQ-9: Brief Depression Severity Measure Score 1       Health Habits and Functional and Cognitive Screening:  Functional & Cognitive Status 4/4/2022   Do you have difficulty preparing food and eating? No   Do you have difficulty bathing yourself, getting dressed or grooming yourself? No   Do you have difficulty using the toilet? No   Do you have difficulty moving around from place to place? No   Do you have trouble with steps or getting out of a bed or a chair? Yes   Current Diet Low Fat Diet   Dental Exam Up to date   Eye Exam Up to date   Exercise (times per week) 6 times per week   Current Exercises Include Walking   Current Exercise Activities Include -   Do you need help using the phone?  No   Are you deaf or do you have serious difficulty hearing?  No   Do you need help with transportation? No   Do you need help shopping? No   Do you need help preparing meals?  No   Do you need help with housework?  No   Do you need help with laundry? No   Do you need help taking your medications? No   Do you need help managing money? No   Do you ever drive or ride in a car without wearing a seat belt? No   Have you felt unusual stress, anger or loneliness in the last month? Yes   Who do you live with? Alone   If you need help, do you have trouble finding someone available to you? No   Have you been bothered in the last four weeks by sexual problems? No   Do you have  difficulty concentrating, remembering or making decisions? No       Age-appropriate Screening Schedule:  Refer to the list below for future screening recommendations based on patient's age, sex and/or medical conditions. Orders for these recommended tests are listed in the plan section. The patient has been provided with a written plan.    Health Maintenance   Topic Date Due   • DXA SCAN  Never done   • LIPID PANEL  03/29/2022   • ZOSTER VACCINE (2 of 2) 04/16/2022   • INFLUENZA VACCINE  08/01/2022   • TDAP/TD VACCINES (2 - Td or Tdap) 04/22/2028              Assessment/Plan   CMS Preventative Services Quick Reference  Risk Factors Identified During Encounter  Cardiovascular Disease  Fall Risk-High or Moderate  The above risks/problems have been discussed with the patient.  Follow up actions/plans if indicated are seen below in the Assessment/Plan Section.  Pertinent information has been shared with the patient in the After Visit Summary.    Diagnoses and all orders for this visit:    1. Degeneration of intervertebral disc of lumbar region (Primary)    2. Chronic right upper quadrant pain  -     CT Abdomen Pelvis With Contrast; Future    3. Left shoulder pain, unspecified chronicity        Follow Up:   No follow-ups on file.     An After Visit Summary and PPPS were made available to the patient.

## 2022-04-05 LAB
25(OH)D3+25(OH)D2 SERPL-MCNC: 43.6 NG/ML (ref 30–100)
ALBUMIN SERPL-MCNC: 4.2 G/DL (ref 3.6–4.6)
ALBUMIN/GLOB SERPL: 2 {RATIO} (ref 1.2–2.2)
ALP SERPL-CCNC: 83 IU/L (ref 44–121)
ALT SERPL-CCNC: 15 IU/L (ref 0–32)
APPEARANCE UR: CLEAR
AST SERPL-CCNC: 25 IU/L (ref 0–40)
BASOPHILS # BLD AUTO: 0 X10E3/UL (ref 0–0.2)
BASOPHILS NFR BLD AUTO: 1 %
BILIRUB SERPL-MCNC: 0.9 MG/DL (ref 0–1.2)
BILIRUB UR QL STRIP: NEGATIVE
BUN SERPL-MCNC: 17 MG/DL (ref 8–27)
BUN/CREAT SERPL: 23 (ref 12–28)
CALCIUM SERPL-MCNC: 9.6 MG/DL (ref 8.7–10.3)
CHLORIDE SERPL-SCNC: 99 MMOL/L (ref 96–106)
CHOLEST SERPL-MCNC: 169 MG/DL (ref 100–199)
CHOLEST/HDLC SERPL: 2.9 RATIO (ref 0–4.4)
CO2 SERPL-SCNC: 26 MMOL/L (ref 20–29)
COLOR UR: YELLOW
CREAT SERPL-MCNC: 0.73 MG/DL (ref 0.57–1)
CRP SERPL-MCNC: <1 MG/L (ref 0–10)
EGFRCR SERPLBLD CKD-EPI 2021: 83 ML/MIN/1.73
EOSINOPHIL # BLD AUTO: 0.1 X10E3/UL (ref 0–0.4)
EOSINOPHIL NFR BLD AUTO: 1 %
ERYTHROCYTE [DISTWIDTH] IN BLOOD BY AUTOMATED COUNT: 12.9 % (ref 11.7–15.4)
ERYTHROCYTE [SEDIMENTATION RATE] IN BLOOD BY WESTERGREN METHOD: 2 MM/HR (ref 0–40)
FOLATE SERPL-MCNC: 13.6 NG/ML
GLOBULIN SER CALC-MCNC: 2.1 G/DL (ref 1.5–4.5)
GLUCOSE SERPL-MCNC: 85 MG/DL (ref 65–99)
GLUCOSE UR QL STRIP: NEGATIVE
HBA1C MFR BLD: 5.2 % (ref 4.8–5.6)
HCT VFR BLD AUTO: 47.2 % (ref 34–46.6)
HDLC SERPL-MCNC: 58 MG/DL
HGB BLD-MCNC: 15.8 G/DL (ref 11.1–15.9)
HGB UR QL STRIP: NEGATIVE
IMM GRANULOCYTES # BLD AUTO: 0 X10E3/UL (ref 0–0.1)
IMM GRANULOCYTES NFR BLD AUTO: 0 %
KETONES UR QL STRIP: NEGATIVE
LDLC SERPL CALC-MCNC: 95 MG/DL (ref 0–99)
LEUKOCYTE ESTERASE UR QL STRIP: NEGATIVE
LYMPHOCYTES # BLD AUTO: 1.8 X10E3/UL (ref 0.7–3.1)
LYMPHOCYTES NFR BLD AUTO: 33 %
MCH RBC QN AUTO: 29.8 PG (ref 26.6–33)
MCHC RBC AUTO-ENTMCNC: 33.5 G/DL (ref 31.5–35.7)
MCV RBC AUTO: 89 FL (ref 79–97)
MICRO URNS: NORMAL
MONOCYTES # BLD AUTO: 0.4 X10E3/UL (ref 0.1–0.9)
MONOCYTES NFR BLD AUTO: 7 %
NEUTROPHILS # BLD AUTO: 3.2 X10E3/UL (ref 1.4–7)
NEUTROPHILS NFR BLD AUTO: 58 %
NITRITE UR QL STRIP: NEGATIVE
PH UR STRIP: 5.5 [PH] (ref 5–7.5)
PLATELET # BLD AUTO: 166 X10E3/UL (ref 150–450)
POTASSIUM SERPL-SCNC: 3.6 MMOL/L (ref 3.5–5.2)
PROT SERPL-MCNC: 6.3 G/DL (ref 6–8.5)
PROT UR QL STRIP: NEGATIVE
RBC # BLD AUTO: 5.3 X10E6/UL (ref 3.77–5.28)
SODIUM SERPL-SCNC: 141 MMOL/L (ref 134–144)
SP GR UR STRIP: 1.01 (ref 1–1.03)
T4 FREE SERPL-MCNC: 1.36 NG/DL (ref 0.82–1.77)
TRIGL SERPL-MCNC: 84 MG/DL (ref 0–149)
TSH SERPL DL<=0.005 MIU/L-ACNC: 0.44 UIU/ML (ref 0.45–4.5)
UROBILINOGEN UR STRIP-MCNC: 0.2 MG/DL (ref 0.2–1)
VIT B12 SERPL-MCNC: 389 PG/ML (ref 232–1245)
VLDLC SERPL CALC-MCNC: 16 MG/DL (ref 5–40)
WBC # BLD AUTO: 5.5 X10E3/UL (ref 3.4–10.8)

## 2022-04-25 ENCOUNTER — OFFICE VISIT (OUTPATIENT)
Dept: INTERNAL MEDICINE | Facility: CLINIC | Age: 82
End: 2022-04-25

## 2022-04-25 VITALS
HEART RATE: 76 BPM | RESPIRATION RATE: 17 BRPM | SYSTOLIC BLOOD PRESSURE: 139 MMHG | WEIGHT: 155 LBS | TEMPERATURE: 97.7 F | DIASTOLIC BLOOD PRESSURE: 81 MMHG | BODY MASS INDEX: 27.46 KG/M2 | OXYGEN SATURATION: 98 % | HEIGHT: 63 IN

## 2022-04-25 DIAGNOSIS — R35.0 FREQUENT URINATION: Primary | ICD-10-CM

## 2022-04-25 PROCEDURE — 99213 OFFICE O/P EST LOW 20 MIN: CPT | Performed by: NURSE PRACTITIONER

## 2022-04-25 NOTE — PROGRESS NOTES
"Chief Complaint  Urinary Frequency (Pt presents here today with concerns of urinary frequency.)    Subjective          Vikki Robledo presents to [facility]  History of Present Illness    Patient is a pleasant 81-year-old female who typically sees Dr. Arteaga here in the office.  Patient is new to me and she presents to the clinic today with urinary frequency.  Patient has complaints of frequent urination x 3 weeks at bedtime.     She has seen cardiologist/cholesterol clinic ([facility]) in the past.   She has worn a Holter monitor x 1 year ago and it was normal.     She does see a urologist (1 to 3 weeks ago) everything is normal.     She is taking the Diovan-HCT at bedtime.     Hx of Arthritis and she has been to specialists in the past.   Denies any chest pain/pressure or SOB on today's office visit.     Objective   Vital Signs:   /81 (BP Location: Right arm, Patient Position: Sitting, Cuff Size: Adult)   Pulse 76   Temp 97.7 °F (36.5 °C)   Resp 17   Ht 160 cm (63\")   Wt 70.3 kg (155 lb)   SpO2 98%   BMI 27.46 kg/m²            Physical Exam  Vitals and nursing note reviewed.   Constitutional:       Appearance: Normal appearance.   HENT:      Head: Normocephalic.      Nose: Nose normal.      Mouth/Throat:      Mouth: Mucous membranes are moist.   Eyes:      Pupils: Pupils are equal, round, and reactive to light.   Cardiovascular:      Rate and Rhythm: Normal rate and regular rhythm.      Pulses: Normal pulses.      Heart sounds: Normal heart sounds.      Comments: No peripheral edema noted.   Pulmonary:      Effort: Pulmonary effort is normal. No respiratory distress.      Breath sounds: Normal breath sounds. No stridor. No wheezing, rhonchi or rales.   Chest:      Chest wall: No tenderness.   Genitourinary:     Comments: Night time urinating after her BP medication.   Musculoskeletal:         General: Normal range of motion.      Cervical back: Normal range of motion. "   Skin:     General: Skin is warm.      Capillary Refill: Capillary refill takes less than 2 seconds.   Neurological:      Mental Status: She is alert and oriented to person, place, and time.   Psychiatric:         Behavior: Behavior normal.        Result Review :            Office Visit with Jewel Arteaga MD (04/04/2022)  Hemoglobin A1c (04/04/2022 4:18 PM)  Urinalysis With Microscopic If Indicated (No Culture) - Urine, Clean Catch (04/04/2022 4:18 PM)       Assessment and Plan    Diagnoses and all orders for this visit:    1. Frequent urination (Primary)  -     Cancel: POCT urinalysis dipstick, automated      She is taking the Diovan-HCT at bedtime.   Monitor your BP throughout the day.Take your BP medication during the day.   If you get worse, please come back to the office.       Follow Up   Return if symptoms worsen or fail to improve.  Patient was given instructions and counseling regarding her condition or for health maintenance advice. Please see specific information pulled into the AVS if appropriate.

## 2022-04-29 NOTE — PROGRESS NOTES
Your results are are overall normal.  We can discuss in the office.  Let me know if you have questions.

## 2022-05-18 ENCOUNTER — HOSPITAL ENCOUNTER (OUTPATIENT)
Dept: CT IMAGING | Facility: HOSPITAL | Age: 82
Discharge: HOME | End: 2022-05-18
Admitting: FAMILY MEDICINE

## 2022-05-18 DIAGNOSIS — R10.11 CHRONIC RIGHT UPPER QUADRANT PAIN: ICD-10-CM

## 2022-05-18 DIAGNOSIS — G89.29 CHRONIC RIGHT UPPER QUADRANT PAIN: ICD-10-CM

## 2022-05-18 PROCEDURE — 74177 CT ABD & PELVIS W/CONTRAST: CPT

## 2022-05-18 PROCEDURE — 25010000002 IOPAMIDOL 61 % SOLUTION: Performed by: FAMILY MEDICINE

## 2022-05-18 PROCEDURE — 0 DIATRIZOATE MEGLUMINE & SODIUM PER 1 ML: Performed by: FAMILY MEDICINE

## 2022-05-18 PROCEDURE — 82565 ASSAY OF CREATININE: CPT

## 2022-05-18 RX ADMIN — IOPAMIDOL 85 ML: 612 INJECTION, SOLUTION INTRAVENOUS at 14:52

## 2022-05-18 RX ADMIN — DIATRIZOATE MEGLUMINE AND DIATRIZOATE SODIUM 30 ML: 600; 100 SOLUTION ORAL; RECTAL at 13:50

## 2022-05-19 LAB — CREAT BLDA-MCNC: 0.8 MG/DL (ref 0.6–1.3)

## 2022-05-20 NOTE — PROGRESS NOTES
Please let her know that this is a good-looking CT scan with no obvious internal problems causing abdominal distention.  There is some diverticulosis but no obvious infection.

## 2022-06-17 ENCOUNTER — TRANSCRIBE ORDERS (OUTPATIENT)
Dept: ADMINISTRATIVE | Facility: HOSPITAL | Age: 82
End: 2022-06-17

## 2022-06-17 DIAGNOSIS — Z12.31 ENCOUNTER FOR SCREENING MAMMOGRAM FOR MALIGNANT NEOPLASM OF BREAST: Primary | ICD-10-CM

## 2022-07-06 ENCOUNTER — TELEPHONE (OUTPATIENT)
Dept: INTERNAL MEDICINE | Facility: CLINIC | Age: 82
End: 2022-07-06

## 2022-07-06 NOTE — TELEPHONE ENCOUNTER
Caller: Vikki Dye    Relationship: Self    Best call back number:  [phone]    What is the best time to reach you: ANY    Who are you requesting to speak with (clinical staff, provider,  specific staff member): CLINICAL OR PROVIDER    What was the call regarding: PATIENT IS CALLING TO DISCUSS THE LETTER THAT WAS SENT OVER BY HER DERMATOLOGIST DR. RAMSES MARTINEZ'S CONCLUSION OR RECOMMENDATION FOR FURTHER CARE OF THE DIAGNOSIS OF PSORIATIC ARTHRITIS.    Do you require a callback: YES

## 2022-07-06 NOTE — TELEPHONE ENCOUNTER
Spoke with the pt and advised Dr Arteaga was currently out of the office so we scheduled her for 7/13/22 to discuss

## 2022-07-13 ENCOUNTER — DOCUMENTATION (OUTPATIENT)
Dept: ORTHOPEDIC SURGERY | Facility: CLINIC | Age: 82
End: 2022-07-13

## 2022-07-14 ENCOUNTER — HOSPITAL ENCOUNTER (OUTPATIENT)
Dept: GENERAL RADIOLOGY | Facility: HOSPITAL | Age: 82
Discharge: HOME | End: 2022-07-14
Admitting: NURSE PRACTITIONER

## 2022-07-14 ENCOUNTER — OFFICE VISIT (OUTPATIENT)
Dept: INTERNAL MEDICINE | Facility: CLINIC | Age: 82
End: 2022-07-14

## 2022-07-14 VITALS
BODY MASS INDEX: 26.75 KG/M2 | WEIGHT: 151 LBS | HEIGHT: 63 IN | DIASTOLIC BLOOD PRESSURE: 71 MMHG | OXYGEN SATURATION: 98 % | TEMPERATURE: 98.2 F | SYSTOLIC BLOOD PRESSURE: 144 MMHG | HEART RATE: 69 BPM

## 2022-07-14 DIAGNOSIS — M25.50 ARTHRALGIA OF MULTIPLE JOINTS: ICD-10-CM

## 2022-07-14 DIAGNOSIS — M25.50 ARTHRALGIA OF MULTIPLE JOINTS: Primary | ICD-10-CM

## 2022-07-14 PROBLEM — M15.0 PRIMARY OSTEOARTHRITIS INVOLVING MULTIPLE JOINTS: Status: ACTIVE | Noted: 2022-07-14

## 2022-07-14 PROBLEM — M15.9 PRIMARY OSTEOARTHRITIS INVOLVING MULTIPLE JOINTS: Status: ACTIVE | Noted: 2022-07-14

## 2022-07-14 PROBLEM — M15.0 PRIMARY OSTEOARTHRITIS INVOLVING MULTIPLE JOINTS: Status: RESOLVED | Noted: 2022-07-14 | Resolved: 2022-07-14

## 2022-07-14 PROBLEM — M15.9 PRIMARY OSTEOARTHRITIS INVOLVING MULTIPLE JOINTS: Status: RESOLVED | Noted: 2022-07-14 | Resolved: 2022-07-14

## 2022-07-14 PROCEDURE — 99214 OFFICE O/P EST MOD 30 MIN: CPT | Performed by: NURSE PRACTITIONER

## 2022-07-14 PROCEDURE — 73502 X-RAY EXAM HIP UNI 2-3 VIEWS: CPT

## 2022-07-14 NOTE — PROGRESS NOTES
"Chief Complaint  Hyperlipidemia (Follow up)    Subjective        Vikki Robledo presents to [facility]  History of Present Illness  This is an 83 y/o presents to visit today for f/u with arthritic discomfort to left arm and left knee. Patient had previously seen orthopedics regarding left shoulder arthritic pain which she did have injection for this. Patient also reports seeing orthopedist regarding left leg/back pain. Patient had seen Dr. Baron previously for L4 indentation at the endplate. Patient recently has been continuing with walking for exercise. Patient reports she has continued to have variable joint discomfort and flares of pain. Patient did see Dr. Ca with dermatology for her psoriasis and it was suggest that this may be related to possible autoimmune arthritis. Patient reports she has started taking ibuprofen which has helped with her pain. Patient also reports recently picking up some voltaren gel to help with pain.       Objective   Vital Signs:  /71 (BP Location: Left arm, Patient Position: Sitting, Cuff Size: Adult)   Pulse 69   Temp 98.2 °F (36.8 °C) (Temporal)   Ht 160 cm (62.99\")   Wt 68.5 kg (151 lb)   SpO2 98%   BMI 26.76 kg/m²   Estimated body mass index is 26.76 kg/m² as calculated from the following:    Height as of this encounter: 160 cm (62.99\").    Weight as of this encounter: 68.5 kg (151 lb).          Physical Exam  Constitutional:       Appearance: Normal appearance.   HENT:      Head: Normocephalic and atraumatic.   Eyes:      Pupils: Pupils are equal, round, and reactive to light.   Cardiovascular:      Rate and Rhythm: Normal rate and regular rhythm.      Pulses: Normal pulses.      Heart sounds: Normal heart sounds. No murmur heard.    No friction rub. No gallop.   Pulmonary:      Effort: Pulmonary effort is normal. No respiratory distress.      Breath sounds: Normal breath sounds. No wheezing or rales.   Musculoskeletal:    "      General: Tenderness and deformity present. Normal range of motion.      Left shoulder: Tenderness present.      Cervical back: Normal range of motion.      Lumbar back: Spasms and tenderness present.      Left hip: Tenderness present. Decreased strength.      Left upper leg: Tenderness present.      Left knee: Tenderness present.   Skin:     General: Skin is warm and dry.   Neurological:      General: No focal deficit present.      Mental Status: She is alert and oriented to person, place, and time. Mental status is at baseline.   Psychiatric:         Mood and Affect: Mood normal.         Thought Content: Thought content normal.         Judgment: Judgment normal.        Result Review :                Assessment and Plan   Diagnoses and all orders for this visit:    1. Arthralgia of multiple joints (Primary)  Assessment & Plan:  XR ordered for left hip.   Continue with ibuprofen 600mg TID PRN.   Voltaren gel to areas of pain QID.   Continue with exercise/activity as tolerated.   We discussed that exercises can help with arthritic pain.   Labs today to r/o autoimmune etiology.     Orders:  -     Sedimentation Rate  -     C-reactive protein  -     ANTHONY With / DsDNA, RNP, Sjogrens A / B, Ca  -     Uric acid  -     CBC & Differential  -     XR Hip With or Without Pelvis 2 - 3 View Left; Future         I spent 30 minutes caring for Vikki Jessica on this date of service. This time includes time spent by me in the following activities:preparing for the visit, reviewing tests, obtaining and/or reviewing a separately obtained history, performing a medically appropriate examination and/or evaluation , counseling and educating the patient/family/caregiver, ordering medications, tests, or procedures, documenting information in the medical record and care coordination  Follow Up   No follow-ups on file.  Patient was given instructions and counseling regarding her condition or for health maintenance advice. Please see specific  information pulled into the AVS if appropriate.

## 2022-07-14 NOTE — ASSESSMENT & PLAN NOTE
XR ordered for left hip.   Continue with ibuprofen 600mg TID PRN.   Voltaren gel to areas of pain QID.   Continue with exercise/activity as tolerated.   We discussed that exercises can help with arthritic pain.   Labs today to r/o autoimmune etiology.

## 2022-07-15 ENCOUNTER — TELEPHONE (OUTPATIENT)
Dept: INTERNAL MEDICINE | Facility: CLINIC | Age: 82
End: 2022-07-15

## 2022-07-15 DIAGNOSIS — M16.12 PRIMARY OSTEOARTHRITIS OF LEFT HIP: ICD-10-CM

## 2022-07-15 DIAGNOSIS — M25.50 ARTHRALGIA OF MULTIPLE JOINTS: Primary | ICD-10-CM

## 2022-07-15 LAB
ANA SER QL: NEGATIVE
BASOPHILS # BLD AUTO: 0.1 X10E3/UL (ref 0–0.2)
BASOPHILS NFR BLD AUTO: 1 %
CRP SERPL-MCNC: <1 MG/L (ref 0–10)
EOSINOPHIL # BLD AUTO: 0.1 X10E3/UL (ref 0–0.4)
EOSINOPHIL NFR BLD AUTO: 1 %
ERYTHROCYTE [DISTWIDTH] IN BLOOD BY AUTOMATED COUNT: 12.3 % (ref 11.7–15.4)
ERYTHROCYTE [SEDIMENTATION RATE] IN BLOOD BY WESTERGREN METHOD: 2 MM/HR (ref 0–40)
HCT VFR BLD AUTO: 45.8 % (ref 34–46.6)
HGB BLD-MCNC: 15.4 G/DL (ref 11.1–15.9)
IMM GRANULOCYTES # BLD AUTO: 0 X10E3/UL (ref 0–0.1)
IMM GRANULOCYTES NFR BLD AUTO: 0 %
LYMPHOCYTES # BLD AUTO: 1.9 X10E3/UL (ref 0.7–3.1)
LYMPHOCYTES NFR BLD AUTO: 23 %
MCH RBC QN AUTO: 30 PG (ref 26.6–33)
MCHC RBC AUTO-ENTMCNC: 33.6 G/DL (ref 31.5–35.7)
MCV RBC AUTO: 89 FL (ref 79–97)
MONOCYTES # BLD AUTO: 0.5 X10E3/UL (ref 0.1–0.9)
MONOCYTES NFR BLD AUTO: 7 %
NEUTROPHILS # BLD AUTO: 5.4 X10E3/UL (ref 1.4–7)
NEUTROPHILS NFR BLD AUTO: 68 %
PLATELET # BLD AUTO: 187 X10E3/UL (ref 150–450)
RBC # BLD AUTO: 5.14 X10E6/UL (ref 3.77–5.28)
URATE SERPL-MCNC: 5.1 MG/DL (ref 3.1–7.9)
WBC # BLD AUTO: 7.9 X10E3/UL (ref 3.4–10.8)

## 2022-07-15 RX ORDER — MELOXICAM 7.5 MG/1
7.5 TABLET ORAL DAILY
Qty: 90 TABLET | Refills: 0 | Status: SHIPPED | OUTPATIENT
Start: 2022-07-15 | End: 2022-08-14

## 2022-07-15 NOTE — PROGRESS NOTES
Please let patient know that her blood work for auto-immune possible arthritis is showing negative.   Her hip XR is showing degenerative arthritis most likely osteoarthritis. I'd recommend a referral over to orthopedics for possible injection or maybe needing further physical therapy. Let me know what she thinks.  Patient may be a great candidate for something like mobic that is an anti-inflammatory she could take daily if she would be agreeable.     Thank you

## 2022-07-15 NOTE — TELEPHONE ENCOUNTER
Please refer to Ortho, PT and send in Mobic to [facility] in chart.     Discussed with patient she voiced understanding.

## 2022-07-21 ENCOUNTER — OFFICE VISIT (OUTPATIENT)
Dept: ORTHOPEDIC SURGERY | Facility: CLINIC | Age: 82
End: 2022-07-21

## 2022-07-21 VITALS — WEIGHT: 151 LBS | TEMPERATURE: 97.5 F | HEIGHT: 62 IN | BODY MASS INDEX: 27.79 KG/M2

## 2022-07-21 DIAGNOSIS — M16.12 PRIMARY OSTEOARTHRITIS OF LEFT HIP: Primary | ICD-10-CM

## 2022-07-21 PROCEDURE — 99214 OFFICE O/P EST MOD 30 MIN: CPT | Performed by: NURSE PRACTITIONER

## 2022-07-21 NOTE — PROGRESS NOTES
Patient: Vikki Robledo  YOB: 1940 82 y.o. female  Medical Record Number: [id number]    Chief Complaints:   Chief Complaint   Patient presents with   • Left Hip - Pain     New problem         History of Present Illness:Vikki Robledo is a 82 y.o. female who presents as a new patient both myself as well as to the practice with complaints of left hip pain.  Patient has had ongoing issues with her lower back and actually saw Dr. Baron in the spring, she had what looked like a new small compression fracture but it healed on its own her back is actually been feeling better but it was about that time she also started with pain in her left hip, that is definitely gotten worse over the last 6 months, she has taken ibuprofen with minimal improvement.  She tried physical therapy for her hip and back and again while backs feeling better hip has definitely gotten worse.  She denies any known injury.    Allergies:   Allergies   Allergen Reactions   • Amoxicillin Other (See Comments)     Skin redness   • Penicillins Other (See Comments)     Cannot remember reaction   • Statins Other (See Comments)     Myalgias       Medications:   Current Outpatient Medications   Medication Sig Dispense Refill   • mupirocin (BACTROBAN) 2 % ointment Apply 1 application topically to the appropriate area as directed 3 (Three) Times a Day. 15 g 0   • tretinoin (RETIN-A) 0.1 % cream      • uribel (URO-MP) 118 MG capsule capsule Take 1 capsule by mouth 3 (Three) Times a Day.     • valsartan-hydrochlorothiazide (DIOVAN-HCT) 160-25 MG per tablet TAKE 1 TABLET DAILY 90 tablet 3   • vitamin B-12 (CYANOCOBALAMIN) 1000 MCG tablet Take 1 tablet by mouth Daily. 90 tablet 3   • ZETIA 10 MG tablet Take 10 mg by mouth. 1 tablet 4 x's a week  0   • azelastine (ASTELIN) 0.1 % nasal spray 2 sprays into the nostril(s) as directed by provider 2 (Two) Times a Day. Use in each nostril as directed 30 mL 3   • ciclopirox (LOPROX) 1 %  "shampoo Shampoo into scalp and let sit on for 5 minutes 3 times per week.  0   • fluocinolone (SYNALAR) 0.01 % external solution APPLY D TO THE SCALP     • meloxicam (Mobic) 7.5 MG tablet Take 1 tablet by mouth Daily for 30 days. 90 tablet 0   • Multiple Vitamins-Minerals (V-C FORTE) capsule Take 1 capsule by mouth Daily.  4   • vitamin D (ERGOCALCIFEROL) 1.25 MG (05375 UT) capsule capsule Take 1 capsule by mouth Every 7 (Seven) Days. 15 capsule 3     No current facility-administered medications for this visit.         The following portions of the patient's history were reviewed and updated as appropriate: allergies, current medications, past family history, past medical history, past social history, past surgical history and problem list.    Review of Systems:   A 14 point review of systems was performed. All systems negative except pertinent positives/negative listed in HPI above    Physical Exam:   Vitals:    22 1350   Temp: 97.5 °F (36.4 °C)   Weight: 68.5 kg (151 lb)   Height: 157.5 cm (62\")       General: A and O x 3, ASA, NAD    SCLERA:    Normal    Skin clear no unusual lesions noted  Left hip the patient is nontender palpation she does have pain with internal ex rotation with a positive Stinchfield positive logroll calf is soft and nontender       Radiology:  Xrays previous x-rays of the left hip were reviewed and show significant osteoarthritis    Assessment/Plan: Osteoarthritis left hip with increasing pain    Patient discussed options, I did briefly mention total hip replacement but the patient was adamant she did not want to have that done, apparently her mom  while having hip replacement surgery and I certainly understand that.  Instead we will proceed with a left hip fluoroscopy guided cortisone injection to see if that helps provide her with some relief and I will see her back for follow-up in approximately 2 months.  She will call in the meantime if her symptoms worsen      More CAVAZOS" Sathish, DENA  7/21/2022

## 2022-08-09 ENCOUNTER — TELEPHONE (OUTPATIENT)
Dept: ORTHOPEDIC SURGERY | Facility: CLINIC | Age: 82
End: 2022-08-09

## 2022-08-09 NOTE — TELEPHONE ENCOUNTER
Patient should be okay to proceed with injection as scheduled.  I would be happy to see her next week if still having pain on the other side

## 2022-08-09 NOTE — TELEPHONE ENCOUNTER
PATIENT CALLED TO ADVISE IS SCHEDULED THIS Thursday FOR INJECTION/EPIDURAL (FOR LEFT HIP) WITH REBEKAH AND WANTED US TO KNOW HAD A FALL & HURT HER OTHER HIP & WANTED TO KNOW, IF HER FALL WOULD AFFECT GETTING THE INJ,IN THE LEFT HIP??  HUB ADVISED WOULD HAVE CLNICAL CALL HER BACK TO FURTHER ADVISE.

## 2022-08-10 ENCOUNTER — APPOINTMENT (OUTPATIENT)
Dept: GENERAL RADIOLOGY | Facility: HOSPITAL | Age: 82
End: 2022-08-10

## 2022-08-10 NOTE — TELEPHONE ENCOUNTER
Left voicemail for patient to return call to clinic, will advise patient to have scheduled epidural injections tomorrow, can follow up with tyree DANIELLE in a week following if still having pain

## 2022-08-11 ENCOUNTER — HOSPITAL ENCOUNTER (OUTPATIENT)
Dept: GENERAL RADIOLOGY | Facility: HOSPITAL | Age: 82
Discharge: HOME | End: 2022-08-11
Admitting: NURSE PRACTITIONER

## 2022-08-11 DIAGNOSIS — M16.12 PRIMARY OSTEOARTHRITIS OF LEFT HIP: ICD-10-CM

## 2022-08-11 PROCEDURE — 25010000002 METHYLPREDNISOLONE PER 125 MG: Performed by: NURSE PRACTITIONER

## 2022-08-11 PROCEDURE — 77002 NEEDLE LOCALIZATION BY XRAY: CPT

## 2022-08-11 PROCEDURE — 25010000002 IOPAMIDOL 61 % SOLUTION: Performed by: NURSE PRACTITIONER

## 2022-08-11 PROCEDURE — 0 LIDOCAINE 1 % SOLUTION: Performed by: NURSE PRACTITIONER

## 2022-08-11 RX ORDER — LIDOCAINE HYDROCHLORIDE 10 MG/ML
10 INJECTION, SOLUTION INFILTRATION; PERINEURAL ONCE
Status: COMPLETED | OUTPATIENT
Start: 2022-08-11 | End: 2022-08-11

## 2022-08-11 RX ORDER — BUPIVACAINE HYDROCHLORIDE 2.5 MG/ML
10 INJECTION, SOLUTION EPIDURAL; INFILTRATION; INTRACAUDAL ONCE
Status: COMPLETED | OUTPATIENT
Start: 2022-08-11 | End: 2022-08-11

## 2022-08-11 RX ORDER — METHYLPREDNISOLONE SODIUM SUCCINATE 125 MG/2ML
80 INJECTION, POWDER, LYOPHILIZED, FOR SOLUTION INTRAMUSCULAR; INTRAVENOUS
Status: COMPLETED | OUTPATIENT
Start: 2022-08-11 | End: 2022-08-11

## 2022-08-11 RX ADMIN — BUPIVACAINE HYDROCHLORIDE 5 ML: 2.5 INJECTION, SOLUTION EPIDURAL; INFILTRATION; INTRACAUDAL; PERINEURAL at 12:22

## 2022-08-11 RX ADMIN — LIDOCAINE HYDROCHLORIDE 3 ML: 10 INJECTION, SOLUTION INFILTRATION; PERINEURAL at 12:22

## 2022-08-11 RX ADMIN — IOPAMIDOL 1 ML: 612 INJECTION, SOLUTION INTRAVENOUS at 12:22

## 2022-08-11 RX ADMIN — METHYLPREDNISOLONE SODIUM SUCCINATE 80 MG: 125 INJECTION, POWDER, LYOPHILIZED, FOR SOLUTION INTRAMUSCULAR; INTRAVENOUS at 12:22

## 2022-08-15 NOTE — TELEPHONE ENCOUNTER
PT CALLED BACK IN , SHE JUST NOTICED THE MESSAGE ON HER ANSWERING MACHINE. PLEASE CALL PT BACK IF STILL NEEDING TO SPEAK WITH HER , TY

## 2022-08-17 ENCOUNTER — TELEPHONE (OUTPATIENT)
Dept: INTERNAL MEDICINE | Facility: CLINIC | Age: 82
End: 2022-08-17

## 2022-08-17 NOTE — TELEPHONE ENCOUNTER
Caller: Vikki Dye    Relationship: Self    Best call back number: [phone]    What test was performed: X-RAY     When was the test performed: PT DOES NOT REMEMBER DATE    Where was the test performed: [facility]     Additional notes: PT STATED THAT SHE NEVER RECEIVED THE RESULTS OF HER 2ND X-RAY.

## 2022-09-13 ENCOUNTER — HOSPITAL ENCOUNTER (OUTPATIENT)
Dept: MAMMOGRAPHY | Facility: HOSPITAL | Age: 82
End: 2022-09-13

## 2022-09-27 ENCOUNTER — OFFICE VISIT (OUTPATIENT)
Dept: ORTHOPEDIC SURGERY | Facility: CLINIC | Age: 82
End: 2022-09-27

## 2022-09-27 VITALS — WEIGHT: 151 LBS | BODY MASS INDEX: 27.79 KG/M2 | HEIGHT: 62 IN | TEMPERATURE: 97.8 F

## 2022-09-27 DIAGNOSIS — M16.12 PRIMARY OSTEOARTHRITIS OF LEFT HIP: Primary | ICD-10-CM

## 2022-09-27 PROCEDURE — 99214 OFFICE O/P EST MOD 30 MIN: CPT | Performed by: NURSE PRACTITIONER

## 2022-09-27 NOTE — PROGRESS NOTES
Patient: Vikki Robledo  YOB: 1940 82 y.o. female  Medical Record Number: [id number]    Chief Complaints:   Chief Complaint   Patient presents with   • Left Hip - Follow-up       History of Present Illness:Vikki Robledo is a 82 y.o. female who presents with complaints of left hip pain.  She was sent for a left hip fluoroscopy guided cortisone injection which worked great.  Now only having minimal left hip pain.  Patient is aware that she has bone-on-bone end-stage osteoarthritis, initially she was not open to having total hip replacement but now that she is thought about it talk to other people she is open to that idea if the pain comes back    Allergies:   Allergies   Allergen Reactions   • Amoxicillin Other (See Comments)     Skin redness   • Penicillins Other (See Comments)     Cannot remember reaction   • Statins Other (See Comments)     Myalgias       Medications:   Current Outpatient Medications   Medication Sig Dispense Refill   • azelastine (ASTELIN) 0.1 % nasal spray 2 sprays into the nostril(s) as directed by provider 2 (Two) Times a Day. Use in each nostril as directed 30 mL 3   • ciclopirox (LOPROX) 1 % shampoo Shampoo into scalp and let sit on for 5 minutes 3 times per week.  0   • fluocinolone (SYNALAR) 0.01 % external solution APPLY D TO THE SCALP     • Multiple Vitamins-Minerals (V-C FORTE) capsule Take 1 capsule by mouth Daily.  4   • mupirocin (BACTROBAN) 2 % ointment Apply 1 application topically to the appropriate area as directed 3 (Three) Times a Day. 15 g 0   • tretinoin (RETIN-A) 0.1 % cream      • uribel (URO-MP) 118 MG capsule capsule Take 1 capsule by mouth 3 (Three) Times a Day.     • valsartan-hydrochlorothiazide (DIOVAN-HCT) 160-25 MG per tablet TAKE 1 TABLET DAILY 90 tablet 3   • vitamin B-12 (CYANOCOBALAMIN) 1000 MCG tablet Take 1 tablet by mouth Daily. 90 tablet 3   • vitamin D (ERGOCALCIFEROL) 1.25 MG (27505 UT) capsule capsule Take 1 capsule by  "mouth Every 7 (Seven) Days. 15 capsule 3   • ZETIA 10 MG tablet Take 10 mg by mouth. 1 tablet 4 x's a week  0     No current facility-administered medications for this visit.         The following portions of the patient's history were reviewed and updated as appropriate: allergies, current medications, past family history, past medical history, past social history, past surgical history and problem list.    Review of Systems:   A 14 point review of systems was performed. All systems negative except pertinent positives/negative listed in HPI above    Physical Exam:   Vitals:    09/27/22 1355   Temp: 97.8 °F (36.6 °C)   TempSrc: Temporal   Weight: 68.5 kg (151 lb)   Height: 157.5 cm (62.01\")       General: A and O x 3, ASA, NAD    SCLERA:    Normal    Skin clear no unusual lesions noted  Left hip patient is nontender palpation she has some mild pain with internal ex rotation with a negative Stinchfield negative logroll calf is soft and nontender       Radiology:  Xrays 2 views of left hip were reviewed today secondary to pain show bone-on-bone end-stage osteoarthritis    Assessment/Plan: End-stage osteoarthritis left hip    The patient and I discussed options including a repeat injection in 2 to 3 months if needed, also left total hip replacement anterior approach 1 to 2-day stay going home with home health.  Patient will think about it tomorrow, give it some time, and call us back when she is ready to proceed with either  Continuation of conservative management vs. KYRA discussed.  The patient wishes to proceed with total hip replacement.  At this point the patient has failed the full gamut of conservative treatment and stating complete understanding of the risks/benefits/ anternatives wishes to proceed with surgical treatment.    Risk and benefits of surgery were reviewed.  Including, but not limited to, blood clots, anesthesia risk, infection, leg length discrepancy, fracture, skin/leg numbness, failure of the " implant, need for future surgeries, continued pain, hematoma, need for transfusion, and death, among others.  The patient understands and wishes to proceed.     The spectrum of treatment options were discussed with the patient in detail including both the nonoperative and operative treatment modalities and their respective risks and benefits.  After thorough discussion, the patient has elected to undergo surgical treatment.  The details of the surgical procedure were explained including the location of probable incisions and a description of the likely implants to be used.  Models and diagrams were used as educational resources. The patient understands the likely convalescence after surgery, as well as the rehabilitation required.  We thoroughly discussed the risks, benefits, and alternatives to surgery.  The risks include but are not limited to the risk of infection, joint stiffness, blood clots (including DVT and/or pulmonary embolus along with the risk of death), neurologic and/or vascular injury, fracture, dislocation, nonunion, malunion, need for further surgery including hardware failure requiring revision, and continued pain.  It was explained that if tissue has been repaired or reconstructed, there is also a chance of failure which may require further management.  Following the completion of the discussion, the patient expressed understanding of this planned course of care, all their questions were answered and consent will be obtained preoperatively.    Operative Plan: Anterior approach Total Hip Replacement  Outpatient          More Bower, APRN  9/27/2022

## 2022-10-11 ENCOUNTER — OFFICE VISIT (OUTPATIENT)
Dept: INTERNAL MEDICINE | Facility: CLINIC | Age: 82
End: 2022-10-11

## 2022-10-11 VITALS
WEIGHT: 151 LBS | BODY MASS INDEX: 27.61 KG/M2 | OXYGEN SATURATION: 97 % | TEMPERATURE: 98.7 F | DIASTOLIC BLOOD PRESSURE: 72 MMHG | SYSTOLIC BLOOD PRESSURE: 154 MMHG | HEART RATE: 64 BPM

## 2022-10-11 DIAGNOSIS — E55.9 VITAMIN D DEFICIENCY: ICD-10-CM

## 2022-10-11 DIAGNOSIS — M25.551 CHRONIC PAIN OF BOTH HIPS: ICD-10-CM

## 2022-10-11 DIAGNOSIS — M25.552 CHRONIC PAIN OF BOTH HIPS: ICD-10-CM

## 2022-10-11 DIAGNOSIS — M15.9 PRIMARY OSTEOARTHRITIS INVOLVING MULTIPLE JOINTS: ICD-10-CM

## 2022-10-11 DIAGNOSIS — E78.2 MIXED HYPERLIPIDEMIA: ICD-10-CM

## 2022-10-11 DIAGNOSIS — Z23 NEED FOR INFLUENZA VACCINATION: Primary | ICD-10-CM

## 2022-10-11 DIAGNOSIS — G89.29 CHRONIC PAIN OF BOTH HIPS: ICD-10-CM

## 2022-10-11 DIAGNOSIS — I10 PRIMARY HYPERTENSION: ICD-10-CM

## 2022-10-11 PROCEDURE — 90662 IIV NO PRSV INCREASED AG IM: CPT | Performed by: FAMILY MEDICINE

## 2022-10-11 PROCEDURE — G0008 ADMIN INFLUENZA VIRUS VAC: HCPCS | Performed by: FAMILY MEDICINE

## 2022-10-11 PROCEDURE — 99214 OFFICE O/P EST MOD 30 MIN: CPT | Performed by: FAMILY MEDICINE

## 2022-10-11 RX ORDER — NYSTATIN 100000 [USP'U]/G
POWDER TOPICAL 4 TIMES DAILY
COMMUNITY
Start: 2022-09-08 | End: 2023-01-13

## 2022-10-11 NOTE — PROGRESS NOTES
"Chief Complaint  Hypertension, Hyperlipidemia, and Vitamin D Deficiency    Subjective        Vikki Robledo presents to [facility]  History of Present Illness  Please see documentation Ambien dictation via CARMEN.      Objective   Vital Signs:  /72 (BP Location: Left arm, Patient Position: Sitting, Cuff Size: Adult)   Pulse 64   Temp 98.7 °F (37.1 °C) (Tympanic)   Wt 68.5 kg (151 lb)   SpO2 97%   BMI 27.61 kg/m²   Estimated body mass index is 27.61 kg/m² as calculated from the following:    Height as of 9/27/22: 157.5 cm (62.01\").    Weight as of this encounter: 68.5 kg (151 lb).          Physical Exam   Result Review :  The following data was reviewed by: Jewel Arteaga MD on 10/11/2022:  Common labs    Common Labs 4/4/22 4/4/22 4/4/22 4/4/22 5/18/22 7/14/22 7/14/22    1618 1618 1618 1618  1419 1419   Glucose 85         BUN 17         Creatinine 0.73    0.80     Sodium 141         Potassium 3.6         Chloride 99         Calcium 9.6         Total Protein 6.3         Albumin 4.2         Total Bilirubin 0.9         Alkaline Phosphatase 83         AST (SGOT) 25         ALT (SGPT) 15         WBC  5.5     7.9   Hemoglobin  15.8     15.4   Hematocrit  47.2 (A)     45.8   Platelets  166     187   Total Cholesterol   169       Triglycerides   84       HDL Cholesterol   58       LDL Cholesterol    95       Hemoglobin A1C    5.2      Uric Acid      5.1    (A) Abnormal value       Comments are available for some flowsheets but are not being displayed.                     Assessment and Plan   Diagnoses and all orders for this visit:    1. Need for influenza vaccination (Primary)  -     Fluzone High-Dose 65+yrs    2. Primary hypertension  -     Urinalysis With Microscopic If Indicated (No Culture) - Urine, Clean Catch  -     TSH  -     T4, Free  -     Lipid Panel With / Chol / HDL Ratio  -     CBC & Differential  -     Comprehensive Metabolic Panel  -     Vitamin D 25 Hydroxy  -   "   Vitamin B12  -     Folate  -     ABO/Rh    3. Mixed hyperlipidemia  -     Urinalysis With Microscopic If Indicated (No Culture) - Urine, Clean Catch  -     TSH  -     T4, Free  -     Lipid Panel With / Chol / HDL Ratio  -     CBC & Differential  -     Comprehensive Metabolic Panel  -     Vitamin D 25 Hydroxy  -     Vitamin B12  -     Folate  -     ABO/Rh    4. Primary osteoarthritis involving multiple joints  -     Urinalysis With Microscopic If Indicated (No Culture) - Urine, Clean Catch  -     TSH  -     T4, Free  -     Lipid Panel With / Chol / HDL Ratio  -     CBC & Differential  -     Comprehensive Metabolic Panel  -     Vitamin D 25 Hydroxy  -     Vitamin B12  -     Folate  -     ABO/Rh    5. Chronic pain of both hips  -     Urinalysis With Microscopic If Indicated (No Culture) - Urine, Clean Catch  -     TSH  -     T4, Free  -     Lipid Panel With / Chol / HDL Ratio  -     CBC & Differential  -     Comprehensive Metabolic Panel  -     Vitamin D 25 Hydroxy  -     Vitamin B12  -     Folate  -     ABO/Rh    6. Vitamin D deficiency  -     Urinalysis With Microscopic If Indicated (No Culture) - Urine, Clean Catch  -     TSH  -     T4, Free  -     Lipid Panel With / Chol / HDL Ratio  -     CBC & Differential  -     Comprehensive Metabolic Panel  -     Vitamin D 25 Hydroxy  -     Vitamin B12  -     Folate  -     ABO/Rh             Follow Up   No follow-ups on file.  Patient was given instructions and counseling regarding her condition or for health maintenance advice. Please see specific information pulled into the AVS if appropriate.

## 2022-10-11 NOTE — PROGRESS NOTES
Chief Complaint  Hypertension, Hyperlipidemia, and Vitamin D Deficiency    Subjective        Vikki Robledo presents to [facility]  History of Present Illness  The patient states she has been dealing with arthritis for the past 6 months. She reports she read in her chart that she has osteoarthritis. She states she had an x-ray of her hip  due to pain on the left side. The x-ray concluded her diagnosis. She reports she received and injection in the left side of her hip 2 months ago. She states she was informed that she would be a great candidate for hip operation. She expresses she is be anxious about doing the operation because her mother, who has had arthritis received hip surgery and passed away 7 years ago. Although she states the procedure was not the primary cause of her mother's death, she is still a bit weary about getting the procedure herself. She reports she was told by the physician that she would be a great candidate because she is not overweight. She states she can not remember the name of the physician. She states the injection she received for hip pain has been effective and she will get another if needed, and that time she will have decided rather she will get the operation. The patient has shown great concern of what effect osteoarthritis will have on her body. She has inquired about taking vitamin D and would like her levels retested. She states she tries to sun bathe but does not feel she does it enough. The patient reports she was prescribed Meloxicam when she started receiving her hip injections. She states she was told 4 weeks ago that she needed to stop taking them because they would cause harm to her kidneys. According to her hip x-ray in her chart she has mild right hip joint space narrowing apparent. Spurring at the left femoral head, that's the ball of the joint, degenerative subcortical eburnation.    The patient states that she has noticed her  "urine output has been irregular, so she stopped taking the Meloxicam. She states she is not quite sure if actually had an effect on her urine output or not. She states she began taking [employer] brand over the counter arthritis pain medicine for a little and then stopped taking them as well. She reports she is not sure which medication would be best to take. She worries about the medication having a negative effect on her kidneys or stomach. She states she would like to lab work done today.    The patient states she quit smoking 30 years ago.    The patient report she is need of a handicap parking sticker.    The patient reports she had been going to a cholesterol clinic that was referred by Dr. Meléndez. She states the clinic helped her with losing weight. She states she would to start back but she is not quite sure.    The patient states she would like to know what her blood type is.    The patient is not diabetic.    The patient report she has had her influenza vaccine and will get her Covid-19 booster from [facility] [employer] soon.      Objective   Vital Signs:  /72 (BP Location: Left arm, Patient Position: Sitting, Cuff Size: Adult)   Pulse 64   Temp 98.7 °F (37.1 °C) (Tympanic)   Wt 68.5 kg (151 lb)   SpO2 97%   BMI 27.61 kg/m²   Estimated body mass index is 27.61 kg/m² as calculated from the following:    Height as of 9/27/22: 157.5 cm (62.01\").    Weight as of this encounter: 68.5 kg (151 lb).          Physical Exam  Vitals reviewed.   Constitutional:       Appearance: She is well-developed.   HENT:      Head: Normocephalic and atraumatic.      Right Ear: Tympanic membrane and external ear normal.      Left Ear: Tympanic membrane and external ear normal.      Nose: Nose normal.   Eyes:      Conjunctiva/sclera: Conjunctivae normal.      Pupils: Pupils are equal, round, and reactive to light.   Neck:      Thyroid: No thyromegaly.      Vascular: No JVD.   Cardiovascular:      Rate and Rhythm: Normal rate and regular " rhythm.      Heart sounds: Normal heart sounds.   Pulmonary:      Effort: Pulmonary effort is normal.      Breath sounds: Normal breath sounds.   Abdominal:      General: Bowel sounds are normal.      Palpations: Abdomen is soft.   Musculoskeletal:         General: Normal range of motion.      Cervical back: Normal range of motion and neck supple.   Lymphadenopathy:      Cervical: No cervical adenopathy.   Skin:     General: Skin is warm and dry.      Findings: No rash.   Neurological:      Mental Status: She is alert and oriented to person, place, and time.      Cranial Nerves: No cranial nerve deficit.      Coordination: Coordination normal.   Psychiatric:         Behavior: Behavior normal.         Thought Content: Thought content normal.         Judgment: Judgment normal.          Result Review :                    Assessment and Plan   Diagnoses and all orders for this visit:    1. Need for influenza vaccination (Primary)  -     Fluzone High-Dose 65+yrs    2. Primary hypertension  -     Urinalysis With Microscopic If Indicated (No Culture) - Urine, Clean Catch  -     TSH  -     T4, Free  -     Lipid Panel With / Chol / HDL Ratio  -     CBC & Differential  -     Comprehensive Metabolic Panel  -     Vitamin D 25 Hydroxy  -     Vitamin B12  -     Folate  -     ABO/Rh    3. Mixed hyperlipidemia  -     Urinalysis With Microscopic If Indicated (No Culture) - Urine, Clean Catch  -     TSH  -     T4, Free  -     Lipid Panel With / Chol / HDL Ratio  -     CBC & Differential  -     Comprehensive Metabolic Panel  -     Vitamin D 25 Hydroxy  -     Vitamin B12  -     Folate  -     ABO/Rh    4. Primary osteoarthritis involving multiple joints  -     Urinalysis With Microscopic If Indicated (No Culture) - Urine, Clean Catch  -     TSH  -     T4, Free  -     Lipid Panel With / Chol / HDL Ratio  -     CBC & Differential  -     Comprehensive Metabolic Panel  -     Vitamin D 25 Hydroxy  -     Vitamin B12  -     Folate  -      ABO/Rh    5. Chronic pain of both hips  -     Urinalysis With Microscopic If Indicated (No Culture) - Urine, Clean Catch  -     TSH  -     T4, Free  -     Lipid Panel With / Chol / HDL Ratio  -     CBC & Differential  -     Comprehensive Metabolic Panel  -     Vitamin D 25 Hydroxy  -     Vitamin B12  -     Folate  -     ABO/Rh    6. Vitamin D deficiency  -     Urinalysis With Microscopic If Indicated (No Culture) - Urine, Clean Catch  -     TSH  -     T4, Free  -     Lipid Panel With / Chol / HDL Ratio  -     CBC & Differential  -     Comprehensive Metabolic Panel  -     Vitamin D 25 Hydroxy  -     Vitamin B12  -     Folate  -     ABO/Rh      1. Vitamin D Deficiency  The patient will have labs done today.     2. Osteoarthritis of both hips  The patient will receive another hip injection. She is to report if hip gets worse to be evaluated for operation. She is expected to return to the clinic 01/2023.    3. Handicap Parking  The patient was given a 6 year place card today.       Follow Up   Return in about 3 months (around 1/11/2023) for Recheck.  Patient was given instructions and counseling regarding her condition or for health maintenance advice. Please see specific information pulled into the AVS if appropriate.     Transcribed from ambient dictation for Jewel Arteaga MD by Radha Mcfarlane.  10/11/22   18:56 EDT    Patient or patient representative verbalized consent to the visit recording.  I have personally performed the services described in this document as transcribed by the above individual, and it is both accurate and complete.

## 2022-10-12 LAB
25(OH)D3+25(OH)D2 SERPL-MCNC: 79.6 NG/ML (ref 30–100)
ABO GROUP BLD: NORMAL
ALBUMIN SERPL-MCNC: 4.6 G/DL (ref 3.5–5.2)
ALBUMIN/GLOB SERPL: 1.9 G/DL
ALP SERPL-CCNC: 91 U/L (ref 39–117)
ALT SERPL-CCNC: 11 U/L (ref 1–33)
AST SERPL-CCNC: 24 U/L (ref 1–32)
BASOPHILS # BLD AUTO: 0.05 10*3/MM3 (ref 0–0.2)
BASOPHILS NFR BLD AUTO: 0.8 % (ref 0–1.5)
BILIRUB SERPL-MCNC: 0.9 MG/DL (ref 0–1.2)
BUN SERPL-MCNC: 20 MG/DL (ref 8–23)
BUN/CREAT SERPL: 27 (ref 7–25)
CALCIUM SERPL-MCNC: 9.5 MG/DL (ref 8.6–10.5)
CHLORIDE SERPL-SCNC: 101 MMOL/L (ref 98–107)
CHOLEST SERPL-MCNC: 203 MG/DL (ref 0–200)
CHOLEST/HDLC SERPL: 3.17 {RATIO}
CO2 SERPL-SCNC: 32 MMOL/L (ref 22–29)
CREAT SERPL-MCNC: 0.74 MG/DL (ref 0.57–1)
EGFRCR SERPLBLD CKD-EPI 2021: 80.9 ML/MIN/1.73
EOSINOPHIL # BLD AUTO: 0.06 10*3/MM3 (ref 0–0.4)
EOSINOPHIL NFR BLD AUTO: 1 % (ref 0.3–6.2)
ERYTHROCYTE [DISTWIDTH] IN BLOOD BY AUTOMATED COUNT: 12.3 % (ref 12.3–15.4)
FOLATE SERPL-MCNC: 14.2 NG/ML (ref 4.78–24.2)
GLOBULIN SER CALC-MCNC: 2.4 GM/DL
GLUCOSE SERPL-MCNC: 95 MG/DL (ref 65–99)
HCT VFR BLD AUTO: 48.6 % (ref 34–46.6)
HDLC SERPL-MCNC: 64 MG/DL (ref 40–60)
HGB BLD-MCNC: 15.9 G/DL (ref 12–15.9)
IMM GRANULOCYTES # BLD AUTO: 0.02 10*3/MM3 (ref 0–0.05)
IMM GRANULOCYTES NFR BLD AUTO: 0.3 % (ref 0–0.5)
LDLC SERPL CALC-MCNC: 124 MG/DL (ref 0–100)
LYMPHOCYTES # BLD AUTO: 1.77 10*3/MM3 (ref 0.7–3.1)
LYMPHOCYTES NFR BLD AUTO: 28.9 % (ref 19.6–45.3)
MCH RBC QN AUTO: 29.8 PG (ref 26.6–33)
MCHC RBC AUTO-ENTMCNC: 32.7 G/DL (ref 31.5–35.7)
MCV RBC AUTO: 91.2 FL (ref 79–97)
MONOCYTES # BLD AUTO: 0.41 10*3/MM3 (ref 0.1–0.9)
MONOCYTES NFR BLD AUTO: 6.7 % (ref 5–12)
NEUTROPHILS # BLD AUTO: 3.81 10*3/MM3 (ref 1.7–7)
NEUTROPHILS NFR BLD AUTO: 62.3 % (ref 42.7–76)
NRBC BLD AUTO-RTO: 0 /100 WBC (ref 0–0.2)
PLATELET # BLD AUTO: 182 10*3/MM3 (ref 140–450)
POTASSIUM SERPL-SCNC: 3.8 MMOL/L (ref 3.5–5.2)
PROT SERPL-MCNC: 7 G/DL (ref 6–8.5)
RBC # BLD AUTO: 5.33 10*6/MM3 (ref 3.77–5.28)
RH BLD: POSITIVE
SODIUM SERPL-SCNC: 142 MMOL/L (ref 136–145)
T4 FREE SERPL-MCNC: 1.3 NG/DL (ref 0.93–1.7)
TRIGL SERPL-MCNC: 84 MG/DL (ref 0–150)
TSH SERPL DL<=0.005 MIU/L-ACNC: 0.87 UIU/ML (ref 0.27–4.2)
VIT B12 SERPL-MCNC: 651 PG/ML (ref 211–946)
VLDLC SERPL CALC-MCNC: 15 MG/DL (ref 5–40)
WBC # BLD AUTO: 6.12 10*3/MM3 (ref 3.4–10.8)

## 2022-10-17 ENCOUNTER — HOSPITAL ENCOUNTER (OUTPATIENT)
Dept: BONE DENSITY | Facility: HOSPITAL | Age: 82
Discharge: HOME | End: 2022-10-17
Admitting: FAMILY MEDICINE

## 2022-10-17 DIAGNOSIS — M85.88 OSTEOPENIA OF LUMBAR SPINE: ICD-10-CM

## 2022-10-17 PROCEDURE — 77080 DXA BONE DENSITY AXIAL: CPT

## 2022-11-07 ENCOUNTER — TELEPHONE (OUTPATIENT)
Dept: INTERNAL MEDICINE | Facility: CLINIC | Age: 82
End: 2022-11-07

## 2022-11-07 NOTE — TELEPHONE ENCOUNTER
Patient has been transferred by [facility] with her stating that she missed a call from Dr Arteaga.   There was no call made  She does however want a letter on her labs from 10-11-22, she states she has viewed them but she understands better when Dr Arteaga explains them in the letter

## 2022-11-10 ENCOUNTER — APPOINTMENT (OUTPATIENT)
Dept: MAMMOGRAPHY | Facility: HOSPITAL | Age: 82
End: 2022-11-10

## 2022-11-27 NOTE — TELEPHONE ENCOUNTER
I talked to her about her labs which are pretty good except for the slight elevation of cholesterol.  She is going to dermatology tomorrow about her rash under the breasts.

## 2023-01-13 ENCOUNTER — OFFICE VISIT (OUTPATIENT)
Dept: INTERNAL MEDICINE | Facility: CLINIC | Age: 83
End: 2023-01-13
Payer: MEDICARE

## 2023-01-13 VITALS
WEIGHT: 140 LBS | OXYGEN SATURATION: 97 % | HEART RATE: 71 BPM | SYSTOLIC BLOOD PRESSURE: 128 MMHG | BODY MASS INDEX: 25.6 KG/M2 | DIASTOLIC BLOOD PRESSURE: 82 MMHG | TEMPERATURE: 98.2 F

## 2023-01-13 DIAGNOSIS — E78.2 MIXED HYPERLIPIDEMIA: ICD-10-CM

## 2023-01-13 DIAGNOSIS — R35.0 FREQUENCY OF URINATION: Primary | ICD-10-CM

## 2023-01-13 DIAGNOSIS — M16.12 PRIMARY OSTEOARTHRITIS OF LEFT HIP: ICD-10-CM

## 2023-01-13 DIAGNOSIS — I10 PRIMARY HYPERTENSION: ICD-10-CM

## 2023-01-13 DIAGNOSIS — M85.852 OSTEOPENIA OF LEFT HIP: ICD-10-CM

## 2023-01-13 PROCEDURE — 99214 OFFICE O/P EST MOD 30 MIN: CPT | Performed by: FAMILY MEDICINE

## 2023-01-13 RX ORDER — CLOTRIMAZOLE 1 %
CREAM (GRAM) TOPICAL
COMMUNITY
Start: 2022-11-13 | End: 2023-04-05

## 2023-01-13 RX ORDER — ERGOCALCIFEROL 1.25 MG/1
50000 CAPSULE ORAL
Qty: 8 CAPSULE | Refills: 3 | Status: SHIPPED | OUTPATIENT
Start: 2023-01-13

## 2023-01-13 RX ORDER — TRIAMCINOLONE ACETONIDE 1 MG/G
CREAM TOPICAL
COMMUNITY
Start: 2022-11-28 | End: 2023-01-13

## 2023-01-13 NOTE — PROGRESS NOTES
"Chief Complaint  Hypertension, Hyperlipidemia, and Vitamin D Deficiency    Subjective        Vikki Robledo presents to [facility]  History of Present Illness  Karyna is here for recheck on a number of issues.  She has seen Dr. Haines for preop evaluation cardiology wise.  He is cleared from cardiology standpoint that she can have evaluation for possible left hip replacement.  We will place a referral for evaluation of anterior approach to the left hip.  This is per her request.    Otherwise she has increased frequency of urination and has used Uribel in the past.  We will get a urine test clean-catch today and see if there are any abnormalities.    Otherwise previous right side discomfort was evaluated with a CT scan without significant findings.  She has a mammogram next week.    Will repeat fill high-dose vitamin D supplement to be taken 50,000 units every other week.    She recounts a severe episode of rash under the breast seen by her gynecologist and subsequently treated with an over-the-counter cream but required further treatment with dermatology Dr. Evans with triamcinolone 0.1% cream for rash that spread from the front to the back with extreme itching to the point that she lost her wedding ring and engagement ring which she is still trying to find.    Otherwise she has a dermatologist that treats her psoriasis of the scalp with Dr. Ca.    We discussed prior DEXA scan with some osteopenia but will hold off on bisphosphonate treatment until she is evaluated for hip replacement.      Objective   Vital Signs:  /82 (BP Location: Left arm, Patient Position: Sitting, Cuff Size: Adult)   Pulse 71   Temp 98.2 °F (36.8 °C) (Tympanic)   Wt 63.5 kg (140 lb)   SpO2 97%   BMI 25.60 kg/m²   Estimated body mass index is 25.6 kg/m² as calculated from the following:    Height as of 9/27/22: 157.5 cm (62.01\").    Weight as of this encounter: 63.5 kg (140 lb).   "           Physical Exam  Vitals reviewed.   Constitutional:       Appearance: She is well-developed.   HENT:      Head: Normocephalic and atraumatic.      Right Ear: Tympanic membrane and external ear normal.      Left Ear: Tympanic membrane and external ear normal.      Nose: Nose normal.   Eyes:      Conjunctiva/sclera: Conjunctivae normal.      Pupils: Pupils are equal, round, and reactive to light.   Neck:      Thyroid: No thyromegaly.      Vascular: No JVD.   Cardiovascular:      Rate and Rhythm: Normal rate and regular rhythm. Occasional extrasystoles are present.     Heart sounds: Normal heart sounds.   Pulmonary:      Effort: Pulmonary effort is normal.      Breath sounds: Normal breath sounds.   Abdominal:      General: Bowel sounds are normal.      Palpations: Abdomen is soft.   Musculoskeletal:      Cervical back: Normal range of motion and neck supple.      Left hip: Decreased range of motion.   Lymphadenopathy:      Cervical: No cervical adenopathy.   Skin:     General: Skin is warm and dry.      Findings: No rash.   Neurological:      Mental Status: She is alert and oriented to person, place, and time.      Cranial Nerves: No cranial nerve deficit.      Coordination: Coordination normal.   Psychiatric:         Behavior: Behavior normal.         Thought Content: Thought content normal.         Judgment: Judgment normal.        Result Review :                   Assessment and Plan   Diagnoses and all orders for this visit:    1. Frequency of urination (Primary)  -     Urinalysis With Culture If Indicated - Urine, Clean Catch    2. Primary osteoarthritis of left hip  -     Ambulatory Referral to Orthopedic Surgery    3. Primary hypertension  Comments:  Valsartan hydrochlorothiazide 1 6025 daily    4. Osteopenia of left hip    5. Mixed hyperlipidemia  Comments:  Zetia 10 mg daily    Other orders  -     vitamin D (ERGOCALCIFEROL) 1.25 MG (31623 UT) capsule capsule; Take 1 capsule by mouth Every 14  (Fourteen) Days.  Dispense: 8 capsule; Refill: 3             Follow Up   Return in about 2 months (around 3/13/2023) for Recheck.  Patient was given instructions and counseling regarding her condition or for health maintenance advice. Please see specific information pulled into the AVS if appropriate.

## 2023-01-14 LAB
APPEARANCE UR: CLEAR
BACTERIA #/AREA URNS HPF: NORMAL /HPF
BILIRUB UR QL STRIP: NEGATIVE
CASTS URNS QL MICRO: NORMAL /LPF
COLOR UR: YELLOW
EPI CELLS #/AREA URNS HPF: NORMAL /HPF (ref 0–10)
GLUCOSE UR QL STRIP: NEGATIVE
HGB UR QL STRIP: NEGATIVE
KETONES UR QL STRIP: NEGATIVE
LEUKOCYTE ESTERASE UR QL STRIP: NEGATIVE
MICRO URNS: NORMAL
MICRO URNS: NORMAL
NITRITE UR QL STRIP: NEGATIVE
PH UR STRIP: 5.5 [PH] (ref 5–7.5)
PROT UR QL STRIP: NEGATIVE
RBC #/AREA URNS HPF: NORMAL /HPF (ref 0–2)
SP GR UR STRIP: 1.02 (ref 1–1.03)
URINALYSIS REFLEX: NORMAL
UROBILINOGEN UR STRIP-MCNC: 0.2 MG/DL (ref 0.2–1)
WBC #/AREA URNS HPF: NORMAL /HPF (ref 0–5)

## 2023-01-18 ENCOUNTER — HOSPITAL ENCOUNTER (OUTPATIENT)
Dept: MAMMOGRAPHY | Facility: HOSPITAL | Age: 83
Discharge: HOME | End: 2023-01-18
Admitting: FAMILY MEDICINE
Payer: MEDICARE

## 2023-01-18 DIAGNOSIS — Z12.31 ENCOUNTER FOR SCREENING MAMMOGRAM FOR MALIGNANT NEOPLASM OF BREAST: ICD-10-CM

## 2023-01-18 PROCEDURE — 77067 SCR MAMMO BI INCL CAD: CPT

## 2023-01-18 PROCEDURE — 77063 BREAST TOMOSYNTHESIS BI: CPT

## 2023-01-20 DIAGNOSIS — I10 ESSENTIAL HYPERTENSION: ICD-10-CM

## 2023-01-20 RX ORDER — VALSARTAN AND HYDROCHLOROTHIAZIDE 160; 25 MG/1; MG/1
TABLET ORAL
Qty: 90 TABLET | Refills: 3 | Status: SHIPPED | OUTPATIENT
Start: 2023-01-20 | End: 2023-04-05

## 2023-03-20 ENCOUNTER — TELEPHONE (OUTPATIENT)
Dept: INTERNAL MEDICINE | Facility: CLINIC | Age: 83
End: 2023-03-20
Payer: MEDICARE

## 2023-03-20 DIAGNOSIS — Z98.890 STATUS POST HIP SURGERY: Primary | ICD-10-CM

## 2023-03-20 NOTE — TELEPHONE ENCOUNTER
Caller: RHIANNA TAB    Relationship: Friend    Best call back number: [phone]    What is the medical concern/diagnosis: HAD HIP SURGERY LAST WEEK AND IS VERY IMMOBILE    What specialty or service is being requested: HOME HEALTH   NURSING, PHYSICAL THERAPY, AND WHATEVER HELP SHE CAN GET     PATIENT HAS NO FAMILY AND LIVES ALONE.

## 2023-03-21 NOTE — TELEPHONE ENCOUNTER
Pt's friend says nobody has set up home health for her and she needs therapy, needs help at home    Ok to place home health orders?  She hasn't seen you since January and has no f/u     Not sure why the surgeon didn't set anything up before the pt was dc'd?

## 2023-03-23 ENCOUNTER — HOME HEALTH ADMISSION (OUTPATIENT)
Dept: HOME HEALTH SERVICES | Facility: HOME HEALTHCARE | Age: 83
End: 2023-03-23
Payer: MEDICARE

## 2023-03-24 NOTE — TELEPHONE ENCOUNTER
PATIENT AND FRIEND FAIZAN CALLING IN THEY STATE THAT THE REFERRAL FOR HOME HEALTH THAT DOCTOR MISTRY SENT IN CAN NOT BE DONE BY THIS AGENCY BECAUSE THEY HAVE NO STAFFING AT THE MOMENT AND ARE WANTING TO TRY ANOTHER COMPANY.      [facility]     THEY ARE NOT SURE WHO TO USE BUT [facility] CAN NOT ACCOMMODATE.      PLEASE ADVISE    CALLBACK NUMBER IS  [phone] OR [phone]

## 2023-03-27 ENCOUNTER — TELEPHONE (OUTPATIENT)
Dept: INTERNAL MEDICINE | Facility: CLINIC | Age: 83
End: 2023-03-27
Payer: MEDICARE

## 2023-03-27 NOTE — TELEPHONE ENCOUNTER
Krystyna Robison, Goddaughter, who is not on the Verbal Release called about Home Health.  I told her we really couldn't discuss with her.  She just wanted to let us know that [facility] did not have the staffing to accommodate her.    Krystyna -  [phone]

## 2023-04-05 ENCOUNTER — OFFICE VISIT (OUTPATIENT)
Dept: INTERNAL MEDICINE | Facility: CLINIC | Age: 83
End: 2023-04-05
Payer: MEDICARE

## 2023-04-05 VITALS
WEIGHT: 147 LBS | SYSTOLIC BLOOD PRESSURE: 114 MMHG | DIASTOLIC BLOOD PRESSURE: 82 MMHG | OXYGEN SATURATION: 99 % | BODY MASS INDEX: 26.88 KG/M2 | HEART RATE: 60 BPM

## 2023-04-05 DIAGNOSIS — I10 PRIMARY HYPERTENSION: Primary | ICD-10-CM

## 2023-04-05 DIAGNOSIS — Z96.642 HISTORY OF LEFT HIP REPLACEMENT: ICD-10-CM

## 2023-04-05 PROCEDURE — 3074F SYST BP LT 130 MM HG: CPT | Performed by: FAMILY MEDICINE

## 2023-04-05 PROCEDURE — 99213 OFFICE O/P EST LOW 20 MIN: CPT | Performed by: FAMILY MEDICINE

## 2023-04-05 PROCEDURE — 3079F DIAST BP 80-89 MM HG: CPT | Performed by: FAMILY MEDICINE

## 2023-04-05 RX ORDER — NYSTATIN 100000 [USP'U]/G
POWDER TOPICAL
COMMUNITY

## 2023-04-05 RX ORDER — METHENAMINE, SODIUM PHOSPHATE, MONOBASIC, MONOHYDRATE, PHENYL SALICYLATE, METHYLENE BLUE, AND HYOSCYAMINE SULFATE 120; 40.8; 36; 10; .12 MG/1; MG/1; MG/1; MG/1; MG/1
1 CAPSULE ORAL EVERY 8 HOURS PRN
COMMUNITY
End: 2023-04-05 | Stop reason: SDUPTHER

## 2023-04-05 RX ORDER — NYSTATIN 100000 U/G
CREAM TOPICAL
COMMUNITY
Start: 2023-03-07

## 2023-04-05 NOTE — ASSESSMENT & PLAN NOTE
Patient is improving after having left hip replaced anterior approach.  The incision has seems very faint surrounding erythema there is 1 scabbed area.  There is no drainage.  I do not think it is infected but we will have her check back with orthopedic surgeon sooner rather than later and she will call today.

## 2023-04-05 NOTE — PROGRESS NOTES
"Chief Complaint  Hypertension    Subjective        Vikki Robledo presents to [facility] PRIMARY [facility]ARE  History of Present Illness  Vikki fox is done very well with anterior approach left hip replacement.  She is walking with a cane.  Pain control is very good.  She has some surrounding erythema at the incision site which appears to be mostly healing.  There is 1 scab.  We will have her check back with the orthopedic surgeon this regard.  There is no drainage.  No fever or chills.    Her blood pressure is down and I do not think she can tolerate losartan hydrochlorothiazide to be restarted.  We will hold off on the blood pressure medication at this time and see her back in 3 months.      Objective   Vital Signs:  /82 (BP Location: Left arm, Patient Position: Sitting, Cuff Size: Adult)   Pulse 60   Wt 66.7 kg (147 lb)   SpO2 99%   BMI 26.88 kg/m²   Estimated body mass index is 26.88 kg/m² as calculated from the following:    Height as of 9/27/22: 157.5 cm (62.01\").    Weight as of this encounter: 66.7 kg (147 lb).             Physical Exam  Vitals reviewed.   Constitutional:       Appearance: She is well-developed.   HENT:      Head: Normocephalic and atraumatic.      Right Ear: Tympanic membrane and external ear normal.      Left Ear: Tympanic membrane and external ear normal.      Nose: Nose normal.   Eyes:      Conjunctiva/sclera: Conjunctivae normal.      Pupils: Pupils are equal, round, and reactive to light.   Neck:      Thyroid: No thyromegaly.      Vascular: No JVD.   Cardiovascular:      Rate and Rhythm: Normal rate and regular rhythm.      Heart sounds: Normal heart sounds.   Pulmonary:      Effort: Pulmonary effort is normal.      Breath sounds: Normal breath sounds.   Abdominal:      General: Bowel sounds are normal.      Palpations: Abdomen is soft.   Musculoskeletal:         General: Normal range of motion.      Cervical back: Normal range of motion and neck supple. "        Legs:    Lymphadenopathy:      Cervical: No cervical adenopathy.   Skin:     General: Skin is warm and dry.      Findings: No rash.   Neurological:      Mental Status: She is alert and oriented to person, place, and time.      Cranial Nerves: No cranial nerve deficit.      Coordination: Coordination normal.   Psychiatric:         Behavior: Behavior normal.         Thought Content: Thought content normal.         Judgment: Judgment normal.        Result Review :                   Assessment and Plan   Diagnoses and all orders for this visit:    1. Primary hypertension (Primary)  Assessment & Plan:  Hypertension is improving with lifestyle modifications.  Medication changes per orders.  Blood pressure will be reassessed in 3 months.  Patient's blood pressure is much improved after hip surgery.  She cannot tolerate the readdition of losartan hydrochlorothiazide and will monitor blood pressure and see her back in 3 months.  She is normotensive off medication at this time.      2. History of left hip replacement  Assessment & Plan:  Patient is improving after having left hip replaced anterior approach.  The incision has seems very faint surrounding erythema there is 1 scabbed area.  There is no drainage.  I do not think it is infected but we will have her check back with orthopedic surgeon sooner rather than later and she will call today.             Follow Up   Return in about 3 months (around 7/5/2023) for Recheck.  Patient was given instructions and counseling regarding her condition or for health maintenance advice. Please see specific information pulled into the AVS if appropriate.

## 2023-04-05 NOTE — ASSESSMENT & PLAN NOTE
Hypertension is improving with lifestyle modifications.  Medication changes per orders.  Blood pressure will be reassessed in 3 months.  Patient's blood pressure is much improved after hip surgery.  She cannot tolerate the readdition of losartan hydrochlorothiazide and will monitor blood pressure and see her back in 3 months.  She is normotensive off medication at this time.

## 2023-07-03 PROBLEM — I10 BENIGN ESSENTIAL HTN: Status: ACTIVE | Noted: 2023-07-03

## 2023-07-03 PROBLEM — R30.0 SPASTIC DYSURIA: Status: ACTIVE | Noted: 2023-07-03

## 2023-07-03 PROBLEM — S02.2XXA FRACTURE OF NASAL BONE: Status: ACTIVE | Noted: 2018-07-27

## 2023-07-03 PROBLEM — L60.3 DYSTROPHIC NAIL: Status: ACTIVE | Noted: 2023-07-03

## 2023-07-03 PROBLEM — I49.1 APC (ATRIAL PREMATURE CONTRACTIONS): Status: ACTIVE | Noted: 2023-01-09

## 2023-08-10 ENCOUNTER — APPOINTMENT (OUTPATIENT)
Dept: GENERAL RADIOLOGY | Facility: HOSPITAL | Age: 83
End: 2023-08-10
Payer: MEDICARE

## 2023-08-10 ENCOUNTER — HOSPITAL ENCOUNTER (OUTPATIENT)
Facility: HOSPITAL | Age: 83
Setting detail: OBSERVATION
Discharge: HOME | End: 2023-08-11
Attending: EMERGENCY MEDICINE | Admitting: EMERGENCY MEDICINE
Payer: MEDICARE

## 2023-08-10 DIAGNOSIS — I48.91 ATRIAL FIBRILLATION WITH RAPID VENTRICULAR RESPONSE: Primary | ICD-10-CM

## 2023-08-10 DIAGNOSIS — E78.5 HYPERLIPIDEMIA, UNSPECIFIED HYPERLIPIDEMIA TYPE: ICD-10-CM

## 2023-08-10 DIAGNOSIS — I10 HYPERTENSION, UNSPECIFIED TYPE: ICD-10-CM

## 2023-08-10 LAB
ALBUMIN SERPL-MCNC: 3.5 G/DL (ref 3.5–5.2)
ALBUMIN/GLOB SERPL: 1.3 G/DL
ALP SERPL-CCNC: 77 U/L (ref 39–117)
ALT SERPL W P-5'-P-CCNC: 12 U/L (ref 1–33)
ANION GAP SERPL CALCULATED.3IONS-SCNC: 9 MMOL/L (ref 5–15)
APTT PPP: 26.7 SECONDS (ref 22.7–35.4)
AST SERPL-CCNC: 26 U/L (ref 1–32)
BASOPHILS # BLD AUTO: 0.04 10*3/MM3 (ref 0–0.2)
BASOPHILS NFR BLD AUTO: 0.7 % (ref 0–1.5)
BILIRUB SERPL-MCNC: 0.6 MG/DL (ref 0–1.2)
BUN SERPL-MCNC: 15 MG/DL (ref 8–23)
BUN/CREAT SERPL: 19 (ref 7–25)
CALCIUM SPEC-SCNC: 8.7 MG/DL (ref 8.6–10.5)
CHLORIDE SERPL-SCNC: 109 MMOL/L (ref 98–107)
CO2 SERPL-SCNC: 27 MMOL/L (ref 22–29)
CREAT SERPL-MCNC: 0.79 MG/DL (ref 0.57–1)
DEPRECATED RDW RBC AUTO: 43.8 FL (ref 37–54)
EGFRCR SERPLBLD CKD-EPI 2021: 74.3 ML/MIN/1.73
EOSINOPHIL # BLD AUTO: 0.13 10*3/MM3 (ref 0–0.4)
EOSINOPHIL NFR BLD AUTO: 2.4 % (ref 0.3–6.2)
ERYTHROCYTE [DISTWIDTH] IN BLOOD BY AUTOMATED COUNT: 13.7 % (ref 12.3–15.4)
GLOBULIN UR ELPH-MCNC: 2.6 GM/DL
GLUCOSE SERPL-MCNC: 95 MG/DL (ref 65–99)
HCT VFR BLD AUTO: 42.6 % (ref 34–46.6)
HGB BLD-MCNC: 13.9 G/DL (ref 12–15.9)
INR PPP: 1.05 (ref 0.9–1.1)
LYMPHOCYTES # BLD AUTO: 1.56 10*3/MM3 (ref 0.7–3.1)
LYMPHOCYTES NFR BLD AUTO: 28.5 % (ref 19.6–45.3)
MAGNESIUM SERPL-MCNC: 1.8 MG/DL (ref 1.6–2.4)
MCH RBC QN AUTO: 28.5 PG (ref 26.6–33)
MCHC RBC AUTO-ENTMCNC: 32.6 G/DL (ref 31.5–35.7)
MCV RBC AUTO: 87.5 FL (ref 79–97)
MONOCYTES # BLD AUTO: 0.45 10*3/MM3 (ref 0.1–0.9)
MONOCYTES NFR BLD AUTO: 8.2 % (ref 5–12)
NEUTROPHILS NFR BLD AUTO: 3.28 10*3/MM3 (ref 1.7–7)
NEUTROPHILS NFR BLD AUTO: 59.8 % (ref 42.7–76)
NT-PROBNP SERPL-MCNC: 1020 PG/ML (ref 0–1800)
PLATELET # BLD AUTO: 138 10*3/MM3 (ref 140–450)
PMV BLD AUTO: 10.2 FL (ref 6–12)
POTASSIUM SERPL-SCNC: 3.8 MMOL/L (ref 3.5–5.2)
PROT SERPL-MCNC: 6.1 G/DL (ref 6–8.5)
PROTHROMBIN TIME: 13.9 SECONDS (ref 11.7–14.2)
RBC # BLD AUTO: 4.87 10*6/MM3 (ref 3.77–5.28)
SODIUM SERPL-SCNC: 145 MMOL/L (ref 136–145)
TROPONIN T SERPL HS-MCNC: 16 NG/L
TSH SERPL DL<=0.05 MIU/L-ACNC: 0.66 UIU/ML (ref 0.27–4.2)
WBC NRBC COR # BLD: 5.48 10*3/MM3 (ref 3.4–10.8)

## 2023-08-10 PROCEDURE — 99284 EMERGENCY DEPT VISIT MOD MDM: CPT

## 2023-08-10 PROCEDURE — 80053 COMPREHEN METABOLIC PANEL: CPT | Performed by: PHYSICIAN ASSISTANT

## 2023-08-10 PROCEDURE — 93005 ELECTROCARDIOGRAM TRACING: CPT

## 2023-08-10 PROCEDURE — 73502 X-RAY EXAM HIP UNI 2-3 VIEWS: CPT

## 2023-08-10 PROCEDURE — 85730 THROMBOPLASTIN TIME PARTIAL: CPT | Performed by: PHYSICIAN ASSISTANT

## 2023-08-10 PROCEDURE — 84484 ASSAY OF TROPONIN QUANT: CPT | Performed by: PHYSICIAN ASSISTANT

## 2023-08-10 PROCEDURE — 93010 ELECTROCARDIOGRAM REPORT: CPT | Performed by: INTERNAL MEDICINE

## 2023-08-10 PROCEDURE — 84443 ASSAY THYROID STIM HORMONE: CPT | Performed by: PHYSICIAN ASSISTANT

## 2023-08-10 PROCEDURE — 85025 COMPLETE CBC W/AUTO DIFF WBC: CPT | Performed by: PHYSICIAN ASSISTANT

## 2023-08-10 PROCEDURE — 93005 ELECTROCARDIOGRAM TRACING: CPT | Performed by: EMERGENCY MEDICINE

## 2023-08-10 PROCEDURE — 83735 ASSAY OF MAGNESIUM: CPT | Performed by: PHYSICIAN ASSISTANT

## 2023-08-10 PROCEDURE — 83880 ASSAY OF NATRIURETIC PEPTIDE: CPT | Performed by: PHYSICIAN ASSISTANT

## 2023-08-10 PROCEDURE — 85610 PROTHROMBIN TIME: CPT | Performed by: PHYSICIAN ASSISTANT

## 2023-08-10 PROCEDURE — 71045 X-RAY EXAM CHEST 1 VIEW: CPT

## 2023-08-10 PROCEDURE — 96374 THER/PROPH/DIAG INJ IV PUSH: CPT

## 2023-08-10 RX ORDER — AMOXICILLIN 250 MG
2 CAPSULE ORAL 2 TIMES DAILY
Status: DISCONTINUED | OUTPATIENT
Start: 2023-08-11 | End: 2023-08-11 | Stop reason: HOSPADM

## 2023-08-10 RX ORDER — SODIUM CHLORIDE 9 MG/ML
40 INJECTION, SOLUTION INTRAVENOUS AS NEEDED
Status: DISCONTINUED | OUTPATIENT
Start: 2023-08-10 | End: 2023-08-11 | Stop reason: HOSPADM

## 2023-08-10 RX ORDER — SODIUM CHLORIDE 0.9 % (FLUSH) 0.9 %
10 SYRINGE (ML) INJECTION AS NEEDED
Status: DISCONTINUED | OUTPATIENT
Start: 2023-08-10 | End: 2023-08-11 | Stop reason: HOSPADM

## 2023-08-10 RX ORDER — SODIUM CHLORIDE 0.9 % (FLUSH) 0.9 %
10 SYRINGE (ML) INJECTION EVERY 12 HOURS SCHEDULED
Status: DISCONTINUED | OUTPATIENT
Start: 2023-08-11 | End: 2023-08-11 | Stop reason: HOSPADM

## 2023-08-10 RX ORDER — BISACODYL 5 MG/1
5 TABLET, DELAYED RELEASE ORAL DAILY PRN
Status: DISCONTINUED | OUTPATIENT
Start: 2023-08-10 | End: 2023-08-11 | Stop reason: HOSPADM

## 2023-08-10 RX ORDER — BISACODYL 10 MG
10 SUPPOSITORY, RECTAL RECTAL DAILY PRN
Status: DISCONTINUED | OUTPATIENT
Start: 2023-08-10 | End: 2023-08-11 | Stop reason: HOSPADM

## 2023-08-10 RX ORDER — POLYETHYLENE GLYCOL 3350 17 G/17G
17 POWDER, FOR SOLUTION ORAL DAILY PRN
Status: DISCONTINUED | OUTPATIENT
Start: 2023-08-10 | End: 2023-08-11 | Stop reason: HOSPADM

## 2023-08-10 RX ADMIN — METOPROLOL TARTRATE 25 MG: 25 TABLET, FILM COATED ORAL at 22:19

## 2023-08-10 RX ADMIN — METOPROLOL TARTRATE 5 MG: 1 INJECTION, SOLUTION INTRAVENOUS at 22:19

## 2023-08-11 ENCOUNTER — APPOINTMENT (OUTPATIENT)
Dept: CARDIOLOGY | Facility: HOSPITAL | Age: 83
End: 2023-08-11
Payer: MEDICARE

## 2023-08-11 ENCOUNTER — READMISSION MANAGEMENT (OUTPATIENT)
Dept: CALL CENTER | Facility: HOSPITAL | Age: 83
End: 2023-08-11
Payer: MEDICARE

## 2023-08-11 VITALS
HEART RATE: 84 BPM | HEIGHT: 62 IN | BODY MASS INDEX: 27.05 KG/M2 | OXYGEN SATURATION: 94 % | DIASTOLIC BLOOD PRESSURE: 72 MMHG | WEIGHT: 147 LBS | SYSTOLIC BLOOD PRESSURE: 132 MMHG | RESPIRATION RATE: 16 BRPM | TEMPERATURE: 97.7 F

## 2023-08-11 LAB
BH CV ECHO MEAS - ACS: 1.74 CM
BH CV ECHO MEAS - AO MAX PG: 4.3 MMHG
BH CV ECHO MEAS - AO MEAN PG: 2.47 MMHG
BH CV ECHO MEAS - AO ROOT DIAM: 3 CM
BH CV ECHO MEAS - AO V2 MAX: 103.9 CM/SEC
BH CV ECHO MEAS - AO V2 VTI: 20.9 CM
BH CV ECHO MEAS - AVA(I,D): 1.62 CM2
BH CV ECHO MEAS - EDV(CUBED): 41.2 ML
BH CV ECHO MEAS - EDV(MOD-SP2): 30 ML
BH CV ECHO MEAS - EDV(MOD-SP4): 31 ML
BH CV ECHO MEAS - EF(MOD-BP): 34.3 %
BH CV ECHO MEAS - EF(MOD-SP2): 43.3 %
BH CV ECHO MEAS - EF(MOD-SP4): 35.5 %
BH CV ECHO MEAS - ESV(CUBED): 11.1 ML
BH CV ECHO MEAS - ESV(MOD-SP2): 17 ML
BH CV ECHO MEAS - ESV(MOD-SP4): 20 ML
BH CV ECHO MEAS - FS: 35.5 %
BH CV ECHO MEAS - IVS/LVPW: 1.08 CM
BH CV ECHO MEAS - IVSD: 1.02 CM
BH CV ECHO MEAS - LAT PEAK E' VEL: 16.3 CM/SEC
BH CV ECHO MEAS - LV MASS(C)D: 98.4 GRAMS
BH CV ECHO MEAS - LV MAX PG: 1.34 MMHG
BH CV ECHO MEAS - LV MEAN PG: 0.69 MMHG
BH CV ECHO MEAS - LV V1 MAX: 57.8 CM/SEC
BH CV ECHO MEAS - LV V1 VTI: 11.8 CM
BH CV ECHO MEAS - LVIDD: 3.5 CM
BH CV ECHO MEAS - LVIDS: 2.23 CM
BH CV ECHO MEAS - LVOT AREA: 2.9 CM2
BH CV ECHO MEAS - LVOT DIAM: 1.91 CM
BH CV ECHO MEAS - LVPWD: 0.94 CM
BH CV ECHO MEAS - MED PEAK E' VEL: 7 CM/SEC
BH CV ECHO MEAS - MV DEC SLOPE: 394.7 CM/SEC2
BH CV ECHO MEAS - MV DEC TIME: 0.17 MSEC
BH CV ECHO MEAS - MV E MAX VEL: 73.7 CM/SEC
BH CV ECHO MEAS - MV MAX PG: 4.6 MMHG
BH CV ECHO MEAS - MV MEAN PG: 1.76 MMHG
BH CV ECHO MEAS - MV P1/2T: 74.8 MSEC
BH CV ECHO MEAS - MV V2 VTI: 18 CM
BH CV ECHO MEAS - MVA(P1/2T): 2.9 CM2
BH CV ECHO MEAS - MVA(VTI): 1.87 CM2
BH CV ECHO MEAS - PA ACC TIME: 0.18 SEC
BH CV ECHO MEAS - PA V2 MAX: 78.7 CM/SEC
BH CV ECHO MEAS - RAP SYSTOLE: 8 MMHG
BH CV ECHO MEAS - RV MAX PG: 0.76 MMHG
BH CV ECHO MEAS - RV V1 MAX: 43.7 CM/SEC
BH CV ECHO MEAS - RV V1 VTI: 10.1 CM
BH CV ECHO MEAS - RVSP: 32 MMHG
BH CV ECHO MEAS - SV(LVOT): 33.8 ML
BH CV ECHO MEAS - SV(MOD-SP2): 13 ML
BH CV ECHO MEAS - SV(MOD-SP4): 11 ML
BH CV ECHO MEAS - TAPSE (>1.6): 1.35 CM
BH CV ECHO MEAS - TR MAX PG: 24.4 MMHG
BH CV ECHO MEAS - TR MAX VEL: 247 CM/SEC
BH CV ECHO MEASUREMENTS AVERAGE E/E' RATIO: 6.33
BH CV XLRA - RV BASE: 3.7 CM
BH CV XLRA - RV LENGTH: 4.7 CM
BH CV XLRA - RV MID: 2.8 CM
BH CV XLRA - TDI S': 11.1 CM/SEC
GEN 5 2HR TROPONIN T REFLEX: 15 NG/L
LEFT ATRIUM VOLUME INDEX: 34.2 ML/M2
QT INTERVAL: 320 MS
QT INTERVAL: 417 MS
SINUS: 3.1 CM
STJ: 2.34 CM
TROPONIN T DELTA: -1 NG/L

## 2023-08-11 PROCEDURE — 97161 PT EVAL LOW COMPLEX 20 MIN: CPT

## 2023-08-11 PROCEDURE — 96375 TX/PRO/DX INJ NEW DRUG ADDON: CPT

## 2023-08-11 PROCEDURE — 93306 TTE W/DOPPLER COMPLETE: CPT

## 2023-08-11 PROCEDURE — 93306 TTE W/DOPPLER COMPLETE: CPT | Performed by: INTERNAL MEDICINE

## 2023-08-11 PROCEDURE — 93010 ELECTROCARDIOGRAM REPORT: CPT | Performed by: INTERNAL MEDICINE

## 2023-08-11 PROCEDURE — 93005 ELECTROCARDIOGRAM TRACING: CPT | Performed by: NURSE PRACTITIONER

## 2023-08-11 PROCEDURE — 99204 OFFICE O/P NEW MOD 45 MIN: CPT | Performed by: INTERNAL MEDICINE

## 2023-08-11 PROCEDURE — 25010000002 DIGOXIN PER 500 MCG: Performed by: INTERNAL MEDICINE

## 2023-08-11 PROCEDURE — G0378 HOSPITAL OBSERVATION PER HR: HCPCS

## 2023-08-11 PROCEDURE — 84484 ASSAY OF TROPONIN QUANT: CPT | Performed by: PHYSICIAN ASSISTANT

## 2023-08-11 RX ORDER — ATENOLOL 25 MG/1
25 TABLET ORAL EVERY 12 HOURS SCHEDULED
Qty: 60 TABLET | Refills: 0 | Status: SHIPPED | OUTPATIENT
Start: 2023-08-11

## 2023-08-11 RX ORDER — ATENOLOL 25 MG/1
25 TABLET ORAL EVERY 12 HOURS SCHEDULED
Qty: 60 TABLET | Refills: 0 | Status: SHIPPED | OUTPATIENT
Start: 2023-08-11 | End: 2023-08-11 | Stop reason: SDUPTHER

## 2023-08-11 RX ORDER — ATENOLOL 25 MG/1
25 TABLET ORAL EVERY 12 HOURS SCHEDULED
Status: DISCONTINUED | OUTPATIENT
Start: 2023-08-11 | End: 2023-08-11 | Stop reason: HOSPADM

## 2023-08-11 RX ORDER — DIGOXIN 0.25 MG/ML
250 INJECTION INTRAMUSCULAR; INTRAVENOUS ONCE
Status: COMPLETED | OUTPATIENT
Start: 2023-08-11 | End: 2023-08-11

## 2023-08-11 RX ORDER — SACCHAROMYCES BOULARDII 250 MG
250 CAPSULE ORAL DAILY
Status: DISCONTINUED | OUTPATIENT
Start: 2023-08-11 | End: 2023-08-11 | Stop reason: HOSPADM

## 2023-08-11 RX ADMIN — APIXABAN 5 MG: 5 TABLET, FILM COATED ORAL at 00:09

## 2023-08-11 RX ADMIN — ATENOLOL 25 MG: 25 TABLET ORAL at 12:00

## 2023-08-11 RX ADMIN — DIGOXIN 250 MCG: 0.25 INJECTION INTRAMUSCULAR; INTRAVENOUS at 09:42

## 2023-08-11 RX ADMIN — Medication 10 ML: at 08:59

## 2023-08-11 RX ADMIN — SENNOSIDES AND DOCUSATE SODIUM 2 TABLET: 50; 8.6 TABLET ORAL at 01:06

## 2023-08-11 RX ADMIN — Medication 10 ML: at 01:07

## 2023-08-11 RX ADMIN — METOPROLOL TARTRATE 25 MG: 25 TABLET, FILM COATED ORAL at 00:09

## 2023-08-11 RX ADMIN — APIXABAN 5 MG: 5 TABLET, FILM COATED ORAL at 09:42

## 2023-08-11 NOTE — ED NOTES
Patient arrived to ED via EMS stretcher.  EMS reports that the call was for elevated blood pressure.  Upon EMS arrival they found the patient to be in Afib, with no history.  Patient AA&Ox4 at time of triage.

## 2023-08-11 NOTE — OUTREACH NOTE
Prep Survey      Flowsheet Row Responses   [facility] patient discharged from? [place]   Is LACE score < 7 ? Yes   Eligibility [facility]   Date of Admission 08/11/23   Date of Discharge 08/11/23   Discharge Disposition Home or Self Care   Discharge diagnosis New onset a-fib   Does the patient have one of the following disease processes/diagnoses(primary or secondary)? Other   Does the patient have Home health ordered? No   Is there a DME ordered? No   Prep survey completed? Yes            Megan HINES - Registered Nurse

## 2023-08-11 NOTE — PLAN OF CARE
Goal Outcome Evaluation:  Plan of Care Reviewed With: patient           Outcome Evaluation: Pt is a 82 yo F who was admitted with new onset Afib. Pt wtih recent fall after slipping on a wet brick path resulting in R hip bruising. Pt demonstrates adequate strength and balance to perform functional mobility and gait independently. Pt with antalgic gait as she walks with a bit of limp. Pt reports she feels she is near her baseline and uses a cane as needed. Pt plans to return home this date. Acute care PT kassie sign off.      Anticipated Discharge Disposition (PT): home

## 2023-08-11 NOTE — ED NOTES
"..Nursing report ED to floor  Vikki Robledo  83 y.o.  female    HPI :   Chief Complaint   Patient presents with    Hypertension    Irregular Heart Beat       Admitting doctor:   Christopher Maldonado MD    Admitting diagnosis:   The primary encounter diagnosis was Atrial fibrillation with rapid ventricular response. Diagnoses of Hypertension, unspecified type and Hyperlipidemia, unspecified hyperlipidemia type were also pertinent to this visit.    Code status:   Current Code Status       Date Active Code Status Order ID Comments User Context       8/10/2023 2355 CPR (Attempt to Resuscitate) [id number]  Marilu Carreon APRN ED        Question Answer    Code Status (Patient has no pulse and is not breathing) CPR (Attempt to Resuscitate)    Medical Interventions (Patient has pulse or is breathing) Full Support    Level Of Support Discussed With Patient                    Allergies:   Amoxicillin, Penicillins, and Statins    Isolation:   No active isolations    Intake and Output  No intake or output data in the 24 hours ending 08/11/23 0000    Weight:       08/10/23  2144   Weight: 66.2 kg (146 lb)       Most recent vitals:   Vitals:    08/10/23 2144 08/10/23 2219 08/10/23 2305 08/10/23 2306   BP: 154/90 143/81 142/87    Pulse: 100 117 100    Resp: 16      Temp: 98.8 øF (37.1 øC)      TempSrc: Tympanic      SpO2: 99%   96%   Weight: 66.2 kg (146 lb)      Height: 157.5 cm (62\")          Active LDAs/IV Access:   Lines, Drains & Airways       Active LDAs       Name Placement date Placement time Site Days    Peripheral IV Anterior;Left;Proximal Forearm --  --  Forearm  --                    Labs (abnormal labs have a star):   Labs Reviewed   COMPREHENSIVE METABOLIC PANEL - Abnormal; Notable for the following components:       Result Value    Chloride 109 (*)     All other components within normal limits    Narrative:     GFR Normal >60  Chronic Kidney Disease <60  Kidney Failure <15    The GFR formula is only " valid for adults with stable renal function between ages 18 and 70.   TROPONIN - Abnormal; Notable for the following components:    HS Troponin T 16 (*)     All other components within normal limits    Narrative:     High Sensitive Troponin T Reference Range:  <10.0 ng/L- Negative Female for AMI  <15.0 ng/L- Negative Male for AMI  >=10 - Abnormal Female indicating possible myocardial injury.  >=15 - Abnormal Male indicating possible myocardial injury.   Clinicians would have to utilize clinical acumen, EKG, Troponin, and serial changes to determine if it is an Acute Myocardial Infarction or myocardial injury due to an underlying chronic condition.        CBC WITH AUTO DIFFERENTIAL - Abnormal; Notable for the following components:    Platelets 138 (*)     All other components within normal limits   PROTIME-INR - Normal   APTT - Normal   BNP (IN-HOUSE) - Normal    Narrative:     Among patients with dyspnea, NT-proBNP is highly sensitive for the detection of acute congestive heart failure. In addition NT-proBNP of <300 pg/ml effectively rules out acute congestive heart failure with 99% negative predictive value.     MAGNESIUM - Normal   TSH - Normal   HIGH SENSITIVITIY TROPONIN T 2HR   CBC AND DIFFERENTIAL    Narrative:     The following orders were created for panel order CBC & Differential.  Procedure                               Abnormality         Status                     ---------                               -----------         ------                     CBC Auto Differential[[id number]]        Abnormal            Final result                 Please view results for these tests on the individual orders.       EKG:   ECG 12 Lead Rhythm Change   Preliminary Result   HEART RATE= 125  bpm   RR Interval= 480  ms   SC Interval=   ms   P Horizontal Axis=   deg   P Front Axis=   deg   QRSD Interval= 83  ms   QT Interval= 320  ms   QRS Axis= 12  deg   T Wave Axis= 19  deg   - ABNORMAL ECG -   Atrial fibrillation    Borderline repolarization abnormality   Electronically Signed By:    Date and Time of Study: 2023-08-10 22:04:36          Meds given in ED:   Medications   sodium chloride 0.9 % flush 10 mL (has no administration in time range)   sodium chloride 0.9 % flush 10 mL (has no administration in time range)   sodium chloride 0.9 % flush 10 mL (has no administration in time range)   sodium chloride 0.9 % infusion 40 mL (has no administration in time range)   sennosides-docusate (PERICOLACE) 8.6-50 MG per tablet 2 tablet (has no administration in time range)     And   polyethylene glycol (MIRALAX) packet 17 g (has no administration in time range)     And   bisacodyl (DULCOLAX) EC tablet 5 mg (has no administration in time range)     And   bisacodyl (DULCOLAX) suppository 10 mg (has no administration in time range)   metoprolol tartrate (LOPRESSOR) tablet 25 mg (has no administration in time range)   apixaban (ELIQUIS) tablet 5 mg (has no administration in time range)   metoprolol tartrate (LOPRESSOR) tablet 25 mg (25 mg Oral Given 8/10/23 2219)   metoprolol tartrate (LOPRESSOR) injection 5 mg (5 mg Intravenous Given 8/10/23 2219)       Imaging results:  XR Chest 1 View    Result Date: 8/10/2023  No acute findings.  This report was finalized on 8/10/2023 11:17 PM by Dr. Aracely Kwan M.D.      XR Hip With or Without Pelvis 2 - 3 View Right    Result Date: 8/10/2023  No acute fracture or subluxation identified.  This report was finalized on 8/10/2023 11:21 PM by Dr. Aracely Kwan M.D.       Ambulatory status:   - standby    Social issues:   Social History     Socioeconomic History    Marital status:    Tobacco Use    Smoking status: Former     Types: Cigarettes    Smokeless tobacco: Never   Vaping Use    Vaping Use: Never used   Substance and Sexual Activity    Alcohol use: Yes     Comment: One or more times monthly     Drug use: Never    Sexual activity: Defer       NIH Stroke Scale:       Nuria Goncalves  RN  08/11/23 00:00 EDT

## 2023-08-11 NOTE — PROGRESS NOTES
MD ATTESTATION NOTE    The HOWARD and I have discussed this patient's history, physical exam, and treatment plan.  I have reviewed the documentation and personally had a face to face interaction with the patient. I affirm the documentation and agree with the treatment and plan.  The attached note describes my personal findings.      I provided a substantive portion of the care of the patient.  I personally performed the physical exam in its entirety, and below are my findings.  For this patient encounter, the patient wore surgical mask, I wore full protective PPE including N95 and eye protection.      Brief HPI: Patient presents for evaluation of new onset A-fib.  Patient states she has felt well throughout the night.  Patient's heart rate is controlled.  Patient has had no chest pain pressure tightness.  Has had no vomiting or diarrhea.            GENERAL: no acute distress  HENT: nares patent  EYES: no scleral icterus  CV: Irregular rhythm.  Normal rate  RESPIRATORY: normal effort  ABDOMEN: soft  MUSCULOSKELETAL: no deformity  NEURO: alert, moves all extremities, follows commands  PSYCH:  calm, cooperative  SKIN: warm, dry    Vital signs and nursing notes reviewed.        Plan: Cardiology consult

## 2023-08-11 NOTE — PROGRESS NOTES
"ED OBSERVATION PROGRESS/DISCHARGE SUMMARY    Date of Admission: 8/10/2023   LOS: 0 days   PCP: Jewel Arteaga MD    Final Diagnosis new onset atrial fibrillation      Subjective     Hospital Outcome:   Afebrile ambulatory 83-year-old  female admitted to the ED observation unit for new onset atrial fibrillation.  Patient does tell me that she was told about 5 years ago by home health nurse that she might have \"A-fib\" she denies any chest pain or palpitations.  She was started on anticoagulants and educated regarding bleeding precautions.    She was seen and evaluated by Dr. Campbell with the cardiology service who is recommended adjusting some of her home medications and having her follow-up with her cardiologist at [facility].  She has been cleared for discharge home.  Patient is agreeable to plan.    ROS:  General: no fevers, chills  Respiratory: no cough, dyspnea  Cardiovascular: no chest pain, palpitations  Abdomen: No abdominal pain, nausea, vomiting, or diarrhea  Neurologic: No focal weakness    Objective   Physical Exam:  I have reviewed the vital signs.  Temp:  [97.9 øF (36.6 øC)-98.8 øF (37.1 øC)] 97.9 øF (36.6 øC)  Heart Rate:  [] 74  Resp:  [16] 16  BP: (111-166)/(56-90) 111/56  General Appearance:    Alert, cooperative, no distress, elderly  Head:    Normocephalic, atraumatic  Eyes:    Sclerae anicteric  Neck:   Supple, no mass  Lungs: Clear to auscultation bilaterally, respirations unlabored  Heart: Irregularly irregular rhythm without tachycardia, S1 and S2 normal, no murmur, rub or gallop  Abdomen:  Soft, non-tender, bowel sounds active, nondistended  Extremities: No clubbing, cyanosis, or edema to lower extremities  Pulses:  2+ and symmetric in distal lower extremities  Skin: No rashes   Neurologic: Oriented x3, Normal strength to extremities    Results Review:    I have reviewed the labs, radiology results and diagnostic studies.    Results from last 7 days   Lab Units 08/10/23  2224 "   WBC 10*3/mm3 5.48   HEMOGLOBIN g/dL 13.9   HEMATOCRIT % 42.6   PLATELETS 10*3/mm3 138*     Results from last 7 days   Lab Units 08/10/23  2224   SODIUM mmol/L 145   POTASSIUM mmol/L 3.8   CHLORIDE mmol/L 109*   CO2 mmol/L 27.0   BUN mg/dL 15   CREATININE mg/dL 0.79   CALCIUM mg/dL 8.7   BILIRUBIN mg/dL 0.6   ALK PHOS U/L 77   ALT (SGPT) U/L 12   AST (SGOT) U/L 26   GLUCOSE mg/dL 95     Imaging Results (Last 24 Hours)       Procedure Component Value Units Date/Time    XR Hip With or Without Pelvis 2 - 3 View Right [[id number]] Collected: 08/10/23 2319     Updated: 08/10/23 2325    Narrative:      AP VIEW THE PELVIS PLUS 2 VIEWS RIGHT HIP     HISTORY: Right hip pain. Fall 2 days ago.     COMPARISON: 07/14/2022.      FINDINGS:  No acute fracture or subluxation the bony pelvis or right hip is seen.  Patient is status post left hip arthroplasty. There are degenerative  changes involving the right hip, stable when compared to prior study.  There are symmetric degenerative changes involving the SI joints  bilaterally. Degenerative changes are noted involving lumbosacral spine.  There are changes of prior ventral hernia repair with mesh.       Impression:      No acute fracture or subluxation identified.     This report was finalized on 8/10/2023 11:21 PM by Dr. Aracely Kwan M.D.       XR Chest 1 View [[id number]] Collected: 08/10/23 2315     Updated: 08/10/23 2320    Narrative:      SINGLE VIEW OF THE CHEST     HISTORY: Chest pain     COMPARISON: None available.     FINDINGS:  There is cardiomegaly. There is no vascular congestion. No pneumothorax,  effusion, or acute infiltrate is seen. There is calcification of the  aorta.       Impression:      No acute findings.     This report was finalized on 8/10/2023 11:17 PM by Dr. Aracely Kwan M.D.               I have reviewed the medications.  ---------------------------------------------------------------------------------------------  Assessment & Plan    Assessment/Problem List    New onset a-fib      Plan:    New onset A-fib  -EKG shows A-fib  -Initially pt received 25 mg of p.o. Lopressor and 5 mg IV  -Dr. Murphy with cardiology was consulted and recommended to give an additional dose of metoprolol p.o. and to start patient on 5 mg of Eliquis  -Echocardiogram shows ejection fraction 51 to 55%, no pericardial effusion  -Fall precaution    Disposition: Home    Follow-up after Discharge: PCP and cardiology    This note will serve as a discharge summary.    Bebe Montoya, DENA 08/11/23 07:27 EDT    I have worn appropriate PPE during this patient encounter, sanitized my hands both with entering and exiting patient's room.      41 minutes has been spent by [facility] providers in the care of this patient while under observation status

## 2023-08-11 NOTE — ED PROVIDER NOTES
" EMERGENCY DEPARTMENT ENCOUNTER    Room Number:  115/1  Date of encounter:  8/11/2023  PCP: Jewel Arteaga MD  Patient Care Team:  Jewel Arteaga MD as PCP - General (Family Medicine)  Kirsten Reynoso MD as Consulting Physician (Dermatology)   Independent Historians: Patient, EMS    HPI:  Chief Complaint: Elevated blood pressure  A complete HPI/ROS/PMH/PSH/SH/FH are unobtainable due to: N/A    Chronic or social conditions impacting patient care (social determinants of health): None    Context: Vikki Robledo is a 83 y.o. female with past medical history of HTN and HLD who arrives to the ED with complaint of elevated blood pressure.  Patient states that she was recently taken off of her blood pressure medicine and had been instructed by her PCP to monitor her pressure due to the change in her medicine and today states that one of the numbers stated \"100\" and that she was told that she should go to the hospital if that were to ever happen.  Patient does not know what vital signs this was.  Patient denies any headache, chest pain, shortness of breath, vision changes, nausea or vomiting, no sweats, palpitations or heart racing.  Patient also complains that 2 days ago she had a slip and fall and hurt her right hip and has a bruise.    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    Medicare annual wellness visit, subsequent 07/10/2018    Vitamin D deficiency 02/28/2020    Hammer toe " of left foot 02/28/2020    Balance disorder 02/28/2020    PVC's (premature ventricular contractions) 02/28/2020    Mild cognitive impairment 06/29/2020    History of left hip replacement 04/05/2023    History of colonic polyps 04/01/2014    Fracture of nasal bone 07/27/2018    Benign essential HTN 07/03/2023    Back pain 02/11/2016    APC (atrial premature contractions) 01/09/2023    Spastic dysuria 07/03/2023    Dystrophic nail 07/03/2023     Resolved Ambulatory Problems     Diagnosis Date Noted    Primary osteoarthritis involving multiple joints 07/14/2022     Past Medical History:   Diagnosis Date    Arthritis     Cataracts, bilateral     Fibrocystic breast     History of breast surgery     Memory loss        The patient has started, but not completed, their COVID-19 vaccination series.    PAST SURGICAL HISTORY  Past Surgical History:   Procedure Laterality Date    BREAST BIOPSY Bilateral     5 bx total ? how many on each breast last one in 80's  all were benign    BREAST SURGERY      aspriaton of cyst x3    CATARACT EXTRACTION, BILATERAL      CHOLECYSTECTOMY  2007    HERNIA REPAIR      x2    TONSILLECTOMY      UTERINE FIBROID EMBOLIZATION  1970         FAMILY HISTORY  Family History   Problem Relation Age of Onset    Alzheimer's disease Other     Pancreatic cancer Other     Alzheimer's disease Mother     Pancreatic cancer Father     Alzheimer's disease Maternal Aunt     Alzheimer's disease Maternal Uncle     Alzheimer's disease Paternal Aunt          SOCIAL HISTORY  Social History     Socioeconomic History    Marital status:    Tobacco Use    Smoking status: Former     Types: Cigarettes    Smokeless tobacco: Never   Vaping Use    Vaping Use: Never used   Substance and Sexual Activity    Alcohol use: Yes     Comment: One or more times monthly     Drug use: Never    Sexual activity: Defer         ALLERGIES  Amoxicillin, Penicillins, and Statins        REVIEW OF SYSTEMS  Review of Systems     All systems  reviewed and negative except for those discussed in HPI.       PHYSICAL EXAM    I have reviewed the triage vital signs and nursing notes.    ED Triage Vitals [08/10/23 2144]   Temp Heart Rate Resp BP SpO2   98.8 øF (37.1 øC) 100 16 154/90 99 %      Temp src Heart Rate Source Patient Position BP Location FiO2 (%)   Tympanic Monitor -- -- --       Physical Exam    GENERAL: alert and oriented x 4, anxious, but not distressed  HENT: normocephalic, atraumatic, moist mucous membranes  EYES: no scleral icterus, PERRL, EOMI  CV: irregularly irregular rhythm, tachycardic  RESPIRATORY: normal effort, CTAB  ABDOMEN: soft/nontender  MUSCULOSKELETAL: no deformity, no pedal edema  NEURO: alert, moves all extremities, no focal neuro deficits, follows commands  SKIN: warm, dry, no rash   Psych: Appropriate mood and affect      Nursing notes and vital signs reviewed      LAB RESULTS  Recent Results (from the past 24 hour(s))   ECG 12 Lead Rhythm Change    Collection Time: 08/10/23 10:04 PM   Result Value Ref Range    QT Interval 320 ms   Comprehensive Metabolic Panel    Collection Time: 08/10/23 10:24 PM    Specimen: Blood   Result Value Ref Range    Glucose 95 65 - 99 mg/dL    BUN 15 8 - 23 mg/dL    Creatinine 0.79 0.57 - 1.00 mg/dL    Sodium 145 136 - 145 mmol/L    Potassium 3.8 3.5 - 5.2 mmol/L    Chloride 109 (H) 98 - 107 mmol/L    CO2 27.0 22.0 - 29.0 mmol/L    Calcium 8.7 8.6 - 10.5 mg/dL    Total Protein 6.1 6.0 - 8.5 g/dL    Albumin 3.5 3.5 - 5.2 g/dL    ALT (SGPT) 12 1 - 33 U/L    AST (SGOT) 26 1 - 32 U/L    Alkaline Phosphatase 77 39 - 117 U/L    Total Bilirubin 0.6 0.0 - 1.2 mg/dL    Globulin 2.6 gm/dL    A/G Ratio 1.3 g/dL    BUN/Creatinine Ratio 19.0 7.0 - 25.0    Anion Gap 9.0 5.0 - 15.0 mmol/L    eGFR 74.3 >60.0 mL/min/1.73   Protime-INR    Collection Time: 08/10/23 10:24 PM    Specimen: Blood   Result Value Ref Range    Protime 13.9 11.7 - 14.2 Seconds    INR 1.05 0.90 - 1.10   aPTT    Collection Time: 08/10/23 10:24  PM    Specimen: Blood   Result Value Ref Range    PTT 26.7 22.7 - 35.4 seconds   BNP    Collection Time: 08/10/23 10:24 PM    Specimen: Blood   Result Value Ref Range    proBNP 1,020.0 0.0 - 1,800.0 pg/mL   High Sensitivity Troponin T    Collection Time: 08/10/23 10:24 PM    Specimen: Blood   Result Value Ref Range    HS Troponin T 16 (H) <10 ng/L   Magnesium    Collection Time: 08/10/23 10:24 PM    Specimen: Blood   Result Value Ref Range    Magnesium 1.8 1.6 - 2.4 mg/dL   TSH    Collection Time: 08/10/23 10:24 PM    Specimen: Blood   Result Value Ref Range    TSH 0.664 0.270 - 4.200 uIU/mL   CBC Auto Differential    Collection Time: 08/10/23 10:24 PM    Specimen: Blood   Result Value Ref Range    WBC 5.48 3.40 - 10.80 10*3/mm3    RBC 4.87 3.77 - 5.28 10*6/mm3    Hemoglobin 13.9 12.0 - 15.9 g/dL    Hematocrit 42.6 34.0 - 46.6 %    MCV 87.5 79.0 - 97.0 fL    MCH 28.5 26.6 - 33.0 pg    MCHC 32.6 31.5 - 35.7 g/dL    RDW 13.7 12.3 - 15.4 %    RDW-SD 43.8 37.0 - 54.0 fl    MPV 10.2 6.0 - 12.0 fL    Platelets 138 (L) 140 - 450 10*3/mm3    Neutrophil % 59.8 42.7 - 76.0 %    Lymphocyte % 28.5 19.6 - 45.3 %    Monocyte % 8.2 5.0 - 12.0 %    Eosinophil % 2.4 0.3 - 6.2 %    Basophil % 0.7 0.0 - 1.5 %    Neutrophils, Absolute 3.28 1.70 - 7.00 10*3/mm3    Lymphocytes, Absolute 1.56 0.70 - 3.10 10*3/mm3    Monocytes, Absolute 0.45 0.10 - 0.90 10*3/mm3    Eosinophils, Absolute 0.13 0.00 - 0.40 10*3/mm3    Basophils, Absolute 0.04 0.00 - 0.20 10*3/mm3       Ordered the above labs and independently reviewed and interpreted the results by me.        RADIOLOGY  XR Chest 1 View    Result Date: 8/10/2023  SINGLE VIEW OF THE CHEST  HISTORY: Chest pain  COMPARISON: None available.  FINDINGS: There is cardiomegaly. There is no vascular congestion. No pneumothorax, effusion, or acute infiltrate is seen. There is calcification of the aorta.      No acute findings.  This report was finalized on 8/10/2023 11:17 PM by Dr. Aracely Kwan,  M.D.      XR Hip With or Without Pelvis 2 - 3 View Right    Result Date: 8/10/2023  AP VIEW THE PELVIS PLUS 2 VIEWS RIGHT HIP  HISTORY: Right hip pain. Fall 2 days ago.  COMPARISON: 07/14/2022.  FINDINGS: No acute fracture or subluxation the bony pelvis or right hip is seen. Patient is status post left hip arthroplasty. There are degenerative changes involving the right hip, stable when compared to prior study. There are symmetric degenerative changes involving the SI joints bilaterally. Degenerative changes are noted involving lumbosacral spine. There are changes of prior ventral hernia repair with mesh.      No acute fracture or subluxation identified.  This report was finalized on 8/10/2023 11:21 PM by Dr. Aracely Kwan M.D.       I ordered the above noted radiological studies.  These were independently interpreted and reviewed by me.  See dictation for official radiology interpretation.      PROCEDURES    Procedures      MEDICATIONS GIVEN IN ER    Medications   sodium chloride 0.9 % flush 10 mL (has no administration in time range)   sodium chloride 0.9 % flush 10 mL (has no administration in time range)   sodium chloride 0.9 % flush 10 mL (has no administration in time range)   sodium chloride 0.9 % infusion 40 mL (has no administration in time range)   sennosides-docusate (PERICOLACE) 8.6-50 MG per tablet 2 tablet (has no administration in time range)     And   polyethylene glycol (MIRALAX) packet 17 g (has no administration in time range)     And   bisacodyl (DULCOLAX) EC tablet 5 mg (has no administration in time range)     And   bisacodyl (DULCOLAX) suppository 10 mg (has no administration in time range)   metoprolol tartrate (LOPRESSOR) tablet 25 mg (25 mg Oral Given 8/10/23 2219)   metoprolol tartrate (LOPRESSOR) injection 5 mg (5 mg Intravenous Given 8/10/23 2219)   metoprolol tartrate (LOPRESSOR) tablet 25 mg (25 mg Oral Given 8/11/23 0009)   apixaban (ELIQUIS) tablet 5 mg (5 mg Oral Given 8/11/23  0009)         PROGRESS, DATA ANALYSIS, CONSULTS, AND MEDICAL DECISION MAKING    All labs have been independently reviewed by me.  All radiology studies have been reviewed by me and discussed with radiologist dictating the report.   EKG's independently viewed and interpreted by me.  Discussion below represents my analysis of pertinent findings related to patient's condition, differential diagnosis, treatment plan and final disposition.    DDx:  Includes, but is not limited to atrial fibrillation, atrial flutter, tachycardia, palpitations, PVCs, PACs, hypertension, elevated blood pressure    After my initial assessment I am concerned about new onset atrial fibrillation and patient may need hospitalization due to cardiology consultation.    ED Course as of 08/11/23 0021   Thu Aug 10, 2023   2208 EKG          EKG time: 2204  Rhythm/Rate: A-fib at 125 bpm  P waves and WA: N/A  QRS, axis: Normal  ST and T waves: No acute ST/T wave changes    Interpreted Contemporaneously by me, independently viewed  Changed compared to prior EKG of 2/28/2020, patient is no longer in sinus rhythm.   [PATRICIO]   2307 WBC: 5.48 [PATRICIO]   2308 Hemoglobin: 13.9 [PATRICIO]   2308 Hematocrit: 42.6 [PATRICIO]   2308 Platelets(!): 138 [PATRICIO]   2308 Glucose: 95 [PATRICIO]   2308 BUN: 15 [PATRICIO]   2308 Creatinine: 0.79 [PATRICIO]   2308 Sodium: 145 [PATRICOI]   2308 Potassium: 3.8 [PATRICIO]   2308 Magnesium: 1.8 [PATRICIO]   2308 Chloride(!): 109 [PATRICIO]   2308 CO2: 27.0 [PATRICIO]   2308 TSH Baseline: 0.664 [PATRICIO]   2308 HS Troponin T(!): 16 [PATRICIO]   2308 proBNP: 1,020.0 [PATRICIO]   2340 Patient rechecked, sitting on stretcher, no acute distress.  Patient's heart rate has slowed to around 100.  Discussed results and plan for admission, patient expresses understanding and agrees with plan. [PATRICIO]   2343 Patient history, ER presentation and evaluation discussed with Wilfred FERNANDES, will place in observation per Dr. Maldonado. [PATRICIO]   2358 Patient care discussed with cardiology, Dr. Murphy, recommended that we give another 25 mg of  metoprolol p.o. as well as anticoagulate with 5 mg of Eliquis p.o. [PATRICIO]      ED Course User Index  [PATRICIO] Henrry Arthur PA       MDM: Patient found to be in A-fib with RVR, have started rate control and anticoagulation after consultation with cardiology.  Patient will be placed in the observation unit.    PPE:  The patient wore a mask and I wore a mask and all appropriate PPE throughout the entire patient encounter.      AS OF 00:21 EDT VITALS:    BP - 166/81  HR - 110  TEMP - 98.8 øF (37.1 øC) (Tympanic)  O2 SATS - 96%      DIAGNOSIS  Final diagnoses:   Atrial fibrillation with rapid ventricular response   Hypertension, unspecified type   Hyperlipidemia, unspecified hyperlipidemia type         DISPOSITION  ADMISSION    Discussed treatment plan and reason for admission with pt/family and admitting physician.  Pt/family voiced understanding of the plan for admission for further testing/treatment as needed.       Note Disclaimer: At [facility], we believe that sharing information builds trust and better relationships. You are receiving this note because you recently visited [facility]. It is possible you will see health information before a provider has talked with you about it. This kind of information can be easy to misunderstand. To help you fully understand what it means for your health, we urge you to discuss this note with your provider.       Henrry Arthur PA  08/11/23 0021

## 2023-08-11 NOTE — CONSULTS
Date of Consultation: 23    Referral Provider: Christopher Maldonado, *     Reason for Consultation: New atrial fibrillation    Encounter Provider: Rigoberto Campbell MD    Group of Service: [facility]     Patient Name: Vikki Robledo    :1940    Chief complaint: Elevated heart rate.    History of Present Illness:      Vikki Robledo is a 83 y.o. female who follows with [facility]. She has a past medical history significant for HTN, HLD, arthritis, carotid stenosis, and memory loss. 4 months ago, she had total left hip replacement and she was taken off of her BP medications.     She came to the emergency department after she had been monitoring her heart rate at home and noticed that it was over 100 on several occasions.  A neighbor had given her a machine to check her blood pressure and pulse recently.  She did not feel palpitations, although she has had some fatigue.  She was noted to be in newly diagnosed atrial fibrillation which was just mildly tachycardic at times.  She has had no chest pain or significant shortness of breath.  She does not have any contraindications to anticoagulation.    ECHO 23    Left ventricular systolic function is normal. Left ventricular ejection fraction appears to be 51 - 55%.    The right ventricular cavity is mildly dilated. Normal right ventricular systolic function noted.    The left atrial cavity is severely dilated.    The right atrial cavity is moderately dilated.    Mild mitral valve regurgitation is present.    Mild tricuspid valve regurgitation is present.    Calculated right ventricular systolic pressure from tricuspid regurgitation is 32 mmHg.    There is no evidence of pericardial effusion    Past Medical History:   Diagnosis Date    Arthritis     Cataracts, bilateral     Fibrocystic breast     History of breast surgery     aspriaton of cyst x3    Hyperlipidemia     Hypertension     Memory loss          Past Surgical  History:   Procedure Laterality Date    BREAST BIOPSY Bilateral     5 bx total ? how many on each breast last one in 80's  all were benign    BREAST SURGERY      aspriaton of cyst x3    CATARACT EXTRACTION, BILATERAL      CHOLECYSTECTOMY  2007    HERNIA REPAIR      x2    TONSILLECTOMY      UTERINE FIBROID EMBOLIZATION  1970         Allergies   Allergen Reactions    Amoxicillin Other (See Comments)     Skin redness    Penicillins Other (See Comments)     Cannot remember reaction    Statins Other (See Comments)     Myalgias         No current facility-administered medications on file prior to encounter.     Current Outpatient Medications on File Prior to Encounter   Medication Sig Dispense Refill    ciclopirox (LOPROX) 1 % shampoo Shampoo into scalp and let sit on for 5 minutes 3 times per week.  0    Florastor 250 MG capsule Take 1 capsule by mouth Daily. 50 capsule 5    fluocinolone (SYNALAR) 0.01 % external solution APPLY D TO THE SCALP      nystatin (MYCOSTATIN) 716703 UNIT/GM powder       uribel (URO-MP) 118 MG capsule capsule Take 1 capsule by mouth Every 8 (Eight) Hours As Needed (urinary symptoms). 30 capsule 0    vitamin B-12 (CYANOCOBALAMIN) 1000 MCG tablet Take 1 tablet by mouth Daily. 90 tablet 3    vitamin D (ERGOCALCIFEROL) 1.25 MG (41451 UT) capsule capsule Take 1 capsule by mouth Every 14 (Fourteen) Days. 8 capsule 3    ZETIA 10 MG tablet Take 1 tablet by mouth. 1 tablet 4 x's a week  0    doxycycline (VIBRAMYCIN) 100 MG capsule Take 1 capsule by mouth 2 (Two) Times a Day. 14 capsule 0    nystatin (MYCOSTATIN) 256468 UNIT/GM cream APPLY TOPICALLY TO THE AFFECTED AREA TWICE DAILY UNTIL GONE           Social History     Socioeconomic History    Marital status:    Tobacco Use    Smoking status: Former     Types: Cigarettes    Smokeless tobacco: Never   Vaping Use    Vaping Use: Never used   Substance and Sexual Activity    Alcohol use: Yes     Comment: One or more times monthly     Drug use: Never  "   Sexual activity: Defer         Family History   Problem Relation Age of Onset    Alzheimer's disease Other     Pancreatic cancer Other     Alzheimer's disease Mother     Pancreatic cancer Father     Alzheimer's disease Maternal Aunt     Alzheimer's disease Maternal Uncle     Alzheimer's disease Paternal Aunt        REVIEW OF SYSTEMS:   Pertinent positives are noted in the HPI above.  Otherwise, all other systems were reviewed, and are negative.     Objective:     Vitals:    08/11/23 0747 08/11/23 0942 08/11/23 0947 08/11/23 1200   BP: 132/72      BP Location: Right arm      Patient Position: Lying      Pulse: 103 91 82 84   Resp: 16      Temp: 97.7 øF (36.5 øC)      TempSrc: Oral      SpO2: 94%      Weight:       Height:         Body mass index is 26.89 kg/mý.  Flowsheet Rows      Flowsheet Row First Filed Value   Admission Height 157.5 cm (62\") Documented at 08/10/2023 2144   Admission Weight 66.2 kg (146 lb) Documented at 08/10/2023 2144             General:    No acute distress, alert and oriented x4, pleasant                   Head:    Normocephalic, atraumatic.   Eyes:          Conjunctivae and sclerae normal, no icterus.   Throat:   No oral lesions, no thrush, oral mucosa moist.    Neck:   Supple, trachea midline.   Lungs:     Clear to auscultation bilaterally     Heart:    Irregularly irregular rhythm with a normal rate.  No murmurs, gallops, or rubs noted.   Abdomen:     Soft, non-tender, non-distended, positive bowel sounds.    Extremities:   No clubbing, cyanosis, or edema.     Pulses:   Pulses palpable and equal bilaterally.    Skin:   No bleeding or rash.   Neuro:   Non-focal.  Moves all extremities well.    Psychiatric:   Normal mood and affect.         Lab Review:                Results from last 7 days   Lab Units 08/10/23  2224   SODIUM mmol/L 145   POTASSIUM mmol/L 3.8   CHLORIDE mmol/L 109*   CO2 mmol/L 27.0   BUN mg/dL 15   CREATININE mg/dL 0.79   GLUCOSE mg/dL 95   CALCIUM mg/dL 8.7 "     Results from last 7 days   Lab Units 08/11/23  0054 08/10/23  2224   HSTROP T ng/L 15* 16*     Results from last 7 days   Lab Units 08/10/23  2224   WBC 10*3/mm3 5.48   HEMOGLOBIN g/dL 13.9   HEMATOCRIT % 42.6   PLATELETS 10*3/mm3 138*     Results from last 7 days   Lab Units 08/10/23  2224   INR  1.05   APTT seconds 26.7         Results from last 7 days   Lab Units 08/10/23  2224   MAGNESIUM mg/dL 1.8       8/11/23 2/28/20      EKG (reviewed by me personally):      Assessment:   1.  Newly diagnosed atrial fibrillation, asymptomatic  2.  Hypertension  3.  Hyperlipidemia  4.  Mild thrombocytopenia  5.  Severe left atrial enlargement  6.  EF 50 to 55%    Plan:       Again, she is asymptomatic from the atrial fibrillation.  She feels no palpitations, and just came to the ER because she noticed her heart rate was over 100.  I am going to place her on atenolol 25 mg twice a day.  She was started on Eliquis 5 mg twice a day, which is the correct dose based on her age, weight, and renal function.  I told her to watch for any significant bruising, bleeding, and black stools.  She does not have any contraindications to anticoagulation.  Her echocardiogram showed severe left atrial enlargement, and I suspect she is going to remain in atrial fibrillation.  Otherwise, her ejection fraction was 50 to 55%.    She is stable for discharge from a cardiac standpoint.  She is going to call Dr. Haines office at [place] for follow-up.    Thank you very much for this consult.    Jayy Campbell MD

## 2023-08-11 NOTE — ED PROVIDER NOTES
MD ATTESTATION NOTE    The HOWARD and I have discussed this patient's history, physical exam, and treatment plan.  I have reviewed the documentation and personally had a face to face interaction with the patient. I affirm the documentation and agree with the treatment and plan.  The attached note describes my personal findings.      I provided a substantive portion of the care of the patient.  I personally performed the physical exam in its entirety, and below are my findings.       Brief HPI: This patient is an 83-year-old female presenting to the emergency room today with elevated blood pressure as well as potential palpitations.  She denies any known history of A-fib or any other cardiac arrhythmia.  She currently denies chest pain, shortness of breath, nausea/vomiting, or fever/chills.      PHYSICAL EXAM  ED Triage Vitals [08/10/23 2144]   Temp Heart Rate Resp BP SpO2   98.8 øF (37.1 øC) 100 16 154/90 99 %      Temp src Heart Rate Source Patient Position BP Location FiO2 (%)   Tympanic Monitor -- -- --         GENERAL: Resting comfortably and in no acute distress, nontoxic in appearance  HENT: nares patent  EYES: no scleral icterus  CV: Irregularly irregular tachycardia, no M/R/G  RESPIRATORY: normal effort, lungs clear bilaterally  ABDOMEN: soft, nontender, no rebound or guarding  MUSCULOSKELETAL: no deformity, nontender, no rebound or guarding  NEURO: alert, moves all extremities, follows commands  PSYCH:  calm, cooperative  SKIN: warm, dry    Vital signs and nursing notes reviewed.        Plan: We will obtain an EKG, labs including cardiac enzymes, as well as chest x-ray in the emergency room today.  Will monitor and reassess following.       Sage Booker MD  08/11/23 0147

## 2023-08-11 NOTE — PLAN OF CARE
Problem: Fall Injury Risk  Goal: Absence of Fall and Fall-Related Injury  Outcome: Ongoing, Progressing  Intervention: Identify and Manage Contributors  Recent Flowsheet Documentation  Taken 8/11/2023 0036 by Cyn Rodney RN  Medication Review/Management: medications reviewed  Intervention: Promote Injury-Free Environment  Recent Flowsheet Documentation  Taken 8/11/2023 0600 by Cyn Rodney RN  Safety Promotion/Fall Prevention:   clutter free environment maintained   assistive device/personal items within reach   fall prevention program maintained   lighting adjusted   nonskid shoes/slippers when out of bed   room organization consistent   safety round/check completed  Taken 8/11/2023 0400 by Cyn Rodney RN  Safety Promotion/Fall Prevention:   assistive device/personal items within reach   clutter free environment maintained   fall prevention program maintained   lighting adjusted   nonskid shoes/slippers when out of bed   room organization consistent   safety round/check completed  Taken 8/11/2023 0200 by Cyn Rodney RN  Safety Promotion/Fall Prevention:   assistive device/personal items within reach   clutter free environment maintained   fall prevention program maintained   lighting adjusted   nonskid shoes/slippers when out of bed   safety round/check completed   room organization consistent  Taken 8/11/2023 0036 by Cyn Rodney RN  Safety Promotion/Fall Prevention:   assistive device/personal items within reach   clutter free environment maintained   fall prevention program maintained   lighting adjusted   nonskid shoes/slippers when out of bed   room organization consistent   safety round/check completed  Goal: Absence of Fall and Fall-Related Injury  Outcome: Ongoing, Progressing  Intervention: Identify and Manage Contributors  Recent Flowsheet Documentation  Taken 8/11/2023 0036 by Cyn Rodney RN  Medication Review/Management: medications reviewed  Intervention: Promote Injury-Free  Environment  Recent Flowsheet Documentation  Taken 8/11/2023 0600 by Cyn Rodney RN  Safety Promotion/Fall Prevention:   clutter free environment maintained   assistive device/personal items within reach   fall prevention program maintained   lighting adjusted   nonskid shoes/slippers when out of bed   room organization consistent   safety round/check completed  Taken 8/11/2023 0400 by Cyn Rondey RN  Safety Promotion/Fall Prevention:   assistive device/personal items within reach   clutter free environment maintained   fall prevention program maintained   lighting adjusted   nonskid shoes/slippers when out of bed   room organization consistent   safety round/check completed  Taken 8/11/2023 0200 by Cyn Rodney RN  Safety Promotion/Fall Prevention:   assistive device/personal items within reach   clutter free environment maintained   fall prevention program maintained   lighting adjusted   nonskid shoes/slippers when out of bed   safety round/check completed   room organization consistent  Taken 8/11/2023 0036 by Cyn Rodney RN  Safety Promotion/Fall Prevention:   assistive device/personal items within reach   clutter free environment maintained   fall prevention program maintained   lighting adjusted   nonskid shoes/slippers when out of bed   room organization consistent   safety round/check completed     Problem: Adult Inpatient Plan of Care  Goal: Plan of Care Review  Outcome: Ongoing, Progressing  Flowsheets (Taken 8/11/2023 0607)  Progress: improving  Plan of Care Reviewed With: patient  Outcome Evaluation: Patient is an 83 year old female admitted to the observation unit for new onset Afib. Patient is alert and oriented, room air and ambulates to the bathroom with a cane via standby assist. Patient has a large bruise on her right thigh due to a fall at home. Bed alarm is activated. Patient has consistently been in A-fib with rate below 90. Cardiology is consulted and has been npo since midnight.  Echo is scheduled for today. Patient resting in bed respiration even and unlabored. No distress noted on Exam. Plan of care ongoing.  Goal: Patient-Specific Goal (Individualized)  Outcome: Ongoing, Progressing  Goal: Absence of Hospital-Acquired Illness or Injury  Outcome: Ongoing, Progressing  Intervention: Identify and Manage Fall Risk  Recent Flowsheet Documentation  Taken 8/11/2023 0600 by Cyn Rodney RN  Safety Promotion/Fall Prevention:   clutter free environment maintained   assistive device/personal items within reach   fall prevention program maintained   lighting adjusted   nonskid shoes/slippers when out of bed   room organization consistent   safety round/check completed  Taken 8/11/2023 0400 by Cyn Rodney RN  Safety Promotion/Fall Prevention:   assistive device/personal items within reach   clutter free environment maintained   fall prevention program maintained   lighting adjusted   nonskid shoes/slippers when out of bed   room organization consistent   safety round/check completed  Taken 8/11/2023 0200 by Cyn Rodney RN  Safety Promotion/Fall Prevention:   assistive device/personal items within reach   clutter free environment maintained   fall prevention program maintained   lighting adjusted   nonskid shoes/slippers when out of bed   safety round/check completed   room organization consistent  Taken 8/11/2023 0036 by Cyn Rodney RN  Safety Promotion/Fall Prevention:   assistive device/personal items within reach   clutter free environment maintained   fall prevention program maintained   lighting adjusted   nonskid shoes/slippers when out of bed   room organization consistent   safety round/check completed  Intervention: Prevent Skin Injury  Recent Flowsheet Documentation  Taken 8/11/2023 0600 by Cyn Rodney RN  Body Position: position changed independently  Taken 8/11/2023 0400 by Cyn Rodney RN  Body Position: position changed independently  Taken 8/11/2023 0200 by Ronel  BRAXTON Addison  Body Position: position changed independently  Taken 8/11/2023 0036 by Cyn Rodney RN  Body Position: position changed independently  Skin Protection: adhesive use limited  Intervention: Prevent and Manage VTE (Venous Thromboembolism) Risk  Recent Flowsheet Documentation  Taken 8/11/2023 0600 by Cyn Rodney RN  Activity Management: activity minimized  Taken 8/11/2023 0400 by Cyn Rodney RN  Activity Management: activity minimized  Taken 8/11/2023 0200 by Cyn Rodney RN  Activity Management: activity minimized  Taken 8/11/2023 0036 by Cyn Rodney RN  Activity Management: activity minimized  VTE Prevention/Management: sequential compression devices off  Range of Motion: active ROM (range of motion) encouraged  Intervention: Prevent Infection  Recent Flowsheet Documentation  Taken 8/11/2023 0600 by Cyn Rodney RN  Infection Prevention:   hand hygiene promoted   rest/sleep promoted   single patient room provided  Taken 8/11/2023 0400 by Cyn Rodney RN  Infection Prevention:   hand hygiene promoted   rest/sleep promoted   single patient room provided  Taken 8/11/2023 0200 by Cyn Rodney RN  Infection Prevention:   rest/sleep promoted   hand hygiene promoted   single patient room provided  Taken 8/11/2023 0036 by Cyn Rodney RN  Infection Prevention:   hand hygiene promoted   rest/sleep promoted   single patient room provided  Goal: Optimal Comfort and Wellbeing  Outcome: Ongoing, Progressing  Intervention: Provide Person-Centered Care  Recent Flowsheet Documentation  Taken 8/11/2023 0036 by Cyn Rodney RN  Trust Relationship/Rapport:   care explained   choices provided   questions answered   questions encouraged  Goal: Readiness for Transition of Care  Outcome: Ongoing, Progressing  Intervention: Mutually Develop Transition Plan  Recent Flowsheet Documentation  Taken 8/11/2023 0038 by Cyn Rodney RN  Transportation Anticipated: family or friend will provide  Patient/Family  Anticipated Services at Transition: none  Patient/Family Anticipates Transition to: home with family  Taken 8/11/2023 0035 by Cyn Rodney RN  Equipment Currently Used at Home: cane, straight     Problem: Asthma Comorbidity  Goal: Maintenance of Asthma Control  Outcome: Ongoing, Progressing  Intervention: Maintain Asthma Symptom Control  Recent Flowsheet Documentation  Taken 8/11/2023 0036 by Cyn Rodney RN  Medication Review/Management: medications reviewed     Problem: Behavioral Health Comorbidity  Goal: Maintenance of Behavioral Health Symptom Control  Outcome: Ongoing, Progressing  Intervention: Maintain Behavioral Health Symptom Control  Recent Flowsheet Documentation  Taken 8/11/2023 0036 by Cyn Rodney RN  Medication Review/Management: medications reviewed     Problem: COPD (Chronic Obstructive Pulmonary Disease) Comorbidity  Goal: Maintenance of COPD Symptom Control  Outcome: Ongoing, Progressing  Intervention: Maintain COPD-Symptom Control  Recent Flowsheet Documentation  Taken 8/11/2023 0036 by Cyn Rodney RN  Supportive Measures: active listening utilized  Medication Review/Management: medications reviewed     Problem: Diabetes Comorbidity  Goal: Blood Glucose Level Within Targeted Range  Outcome: Ongoing, Progressing     Problem: Heart Failure Comorbidity  Goal: Maintenance of Heart Failure Symptom Control  Outcome: Ongoing, Progressing  Intervention: Maintain Heart Failure-Management  Recent Flowsheet Documentation  Taken 8/11/2023 0036 by Cyn Rodney RN  Medication Review/Management: medications reviewed     Problem: Hypertension Comorbidity  Goal: Blood Pressure in Desired Range  Outcome: Ongoing, Progressing  Intervention: Maintain Blood Pressure Management  Recent Flowsheet Documentation  Taken 8/11/2023 0036 by Cyn Rodney RN  Medication Review/Management: medications reviewed     Problem: Obstructive Sleep Apnea Risk or Actual Comorbidity Management  Goal: Unobstructed Breathing  During Sleep  Outcome: Ongoing, Progressing     Problem: Osteoarthritis Comorbidity  Goal: Maintenance of Osteoarthritis Symptom Control  Outcome: Ongoing, Progressing  Intervention: Maintain Osteoarthritis Symptom Control  Recent Flowsheet Documentation  Taken 8/11/2023 0600 by Cyn Rodney RN  Activity Management: activity minimized  Taken 8/11/2023 0400 by Cyn Rodney RN  Activity Management: activity minimized  Taken 8/11/2023 0200 by Cyn Rodney RN  Activity Management: activity minimized  Taken 8/11/2023 0036 by Cyn Rodney RN  Activity Management: activity minimized  Assistive Device Utilized: cane  Medication Review/Management: medications reviewed     Problem: Pain Chronic (Persistent) (Comorbidity Management)  Goal: Acceptable Pain Control and Functional Ability  Outcome: Ongoing, Progressing  Intervention: Manage Persistent Pain  Recent Flowsheet Documentation  Taken 8/11/2023 0036 by Cyn Rodney RN  Medication Review/Management: medications reviewed  Intervention: Optimize Psychosocial Wellbeing  Recent Flowsheet Documentation  Taken 8/11/2023 0036 by Cyn Rodney RN  Supportive Measures: active listening utilized     Problem: Seizure Disorder Comorbidity  Goal: Maintenance of Seizure Control  Outcome: Ongoing, Progressing   Goal Outcome Evaluation:  Plan of Care Reviewed With: patient        Progress: improving  Outcome Evaluation: Patient is an 83 year old female admitted to the observation unit for new onset Afib. Patient is alert and oriented, room air and ambulates to the bathroom with a cane via standby assist. Patient has a large bruise on her right thigh due to a fall at home. Bed alarm is activated. Patient has consistently been in A-fib with rate below 90. Cardiology is consulted and has been npo since midnight. Echo is scheduled for today. Patient resting in bed respiration even and unlabored. No distress noted on Exam. Plan of care ongoing.

## 2023-08-11 NOTE — THERAPY EVALUATION
Patient Name: Vikki Robledo  : 1940    MRN: [id number]                              Today's Date: 2023       Admit Date: 8/10/2023    Visit Dx:     ICD-10-CM ICD-9-CM   1. Atrial fibrillation with rapid ventricular response  I48.91 427.31   2. Hypertension, unspecified type  I10 401.9   3. Hyperlipidemia, unspecified hyperlipidemia type  E78.5 272.4     Patient Active Problem List   Diagnosis    Degeneration of intervertebral disc of lumbar region    Hyperlipidemia    Hypertension    Stenosis of carotid artery    Carotid stenosis    Muscle spasm    Transient insomnia    Psoriatic arthritis    Psoriasis    Ludy's node    Medicare annual wellness visit, subsequent    Vitamin D deficiency    Hammer toe of left foot    Balance disorder    PVC's (premature ventricular contractions)    Mild cognitive impairment    History of left hip replacement    History of colonic polyps    Fracture of nasal bone    Benign essential HTN    Back pain    APC (atrial premature contractions)    Spastic dysuria    Dystrophic nail    New onset a-fib     Past Medical History:   Diagnosis Date    Arthritis     Cataracts, bilateral     Fibrocystic breast     History of breast surgery     aspriaton of cyst x3    Hyperlipidemia     Hypertension     Memory loss      Past Surgical History:   Procedure Laterality Date    BREAST BIOPSY Bilateral     5 bx total ? how many on each breast last one in 80's  all were benign    BREAST SURGERY      aspriaton of cyst x3    CATARACT EXTRACTION, BILATERAL      CHOLECYSTECTOMY      HERNIA REPAIR      x2    TONSILLECTOMY      UTERINE FIBROID EMBOLIZATION  1970      General Information       Row Name 23 1147          Physical Therapy Time and Intention    Document Type evaluation  -CH     Mode of Treatment individual therapy;physical therapy  -CH       Row Name 23 1147          General Information    Prior Level of Function independent:;gait;transfer;bed mobility  uses  cane PRN  -     Existing Precautions/Restrictions no known precautions/restrictions  -     Barriers to Rehab none identified  -       Row Name 08/11/23 1147          Living Environment    People in Home alone  -       Row Name 08/11/23 1147          Cognition    Orientation Status (Cognition) oriented x 4  -               User Key  (r) = Recorded By, (t) = Taken By, (c) = Cosigned By      Initials Name Provider Type     Padmini Pollock, PT Physical Therapist                   Mobility       Row Name 08/11/23 1148          Bed Mobility    Bed Mobility supine-sit;sit-supine  -     Supine-Sit Stokes (Bed Mobility) not tested  standing in room  -     Sit-Supine Stokes (Bed Mobility) not tested  sitting EOB  -       Row Name 08/11/23 1148          Sit-Stand Transfer    Sit-Stand Stokes (Transfers) supervision  -[facility] Name 08/11/23 1148          Gait/Stairs (Locomotion)    Stokes Level (Gait) standby assist  -     Distance in Feet (Gait) 200  -     Comment, (Gait/Stairs) no LOB noted with ambulation, pt with antalgic gait, limping noted  -               User Key  (r) = Recorded By, (t) = Taken By, (c) = Cosigned By      Initials Name Provider Type     Padmini Pollock, PT Physical Therapist                   Obj/Interventions       [facility] Name 08/11/23 1155          Range of Motion Comprehensive    General Range of Motion no range of motion deficits identified  -[facility] Name 08/11/23 1155          Strength Comprehensive (MMT)    General Manual Muscle Testing (MMT) Assessment no strength deficits identified  -       Row Name 08/11/23 1155          Balance    Balance Assessment standing static balance;standing dynamic balance  -     Static Standing Balance standby assist  -     Dynamic Standing Balance standby assist  -               User Key  (r) = Recorded By, (t) = Taken By, (c) = Cosigned By      Initials Name Provider Type     Padmini Pollock,  PT Physical Therapist                   Goals/Plan    No documentation.                  Clinical Impression       Row Name 08/11/23 1156          Pain    Pretreatment Pain Rating 0/10 - no pain  -     Posttreatment Pain Rating 0/10 - no pain  -       Row Name 08/11/23 1156          Plan of Care Review    Plan of Care Reviewed With patient  -     Outcome Evaluation Pt is a 82 yo F who was admitted with new onset Afib. Pt wtih recent fall after slipping on a wet brick path resulting in R hip bruising. Pt demonstrates adequate strength and balance to perform functional mobility and gait independently. Pt with antalgic gait as she walks with a bit of limp. Pt reports she feels she is near her baseline and uses a cane as needed. Pt plans to return home this date. Acute care PT kassie sign off.  -       Row Name 08/11/23 1156          Therapy Assessment/Plan (PT)    Patient/Family Therapy Goals Statement (PT) to return home  -     Therapy Frequency (PT) evaluation only  -       Row Name 08/11/23 1156          Positioning and Restraints    Pre-Treatment Position standing in room  -     Post Treatment Position bed  -     In Bed sitting;call light within reach;encouraged to call for assist;with [facility]  -               User Key  (r) = Recorded By, (t) = Taken By, (c) = Cosigned By      Initials Name Provider Type     Padmini Pollock, PT Physical Therapist                   Outcome Measures       Row Name 08/11/23 1325 08/11/23 0740       How much help from another person do you currently need...    Turning from your back to your side while in flat bed without using bedrails? 4  -CH 4  -SF    Moving from lying on back to sitting on the side of a flat bed without bedrails? 4  -CH 4  -SF    Moving to and from a bed to a chair (including a wheelchair)? 4  -CH 4  -SF    Standing up from a chair using your arms (e.g., wheelchair, bedside chair)? 4  -CH 4  -SF    Climbing 3-5 steps with a railing? 3  -CH 3  -SF     To walk in hospital room? 3  - 4  -SF    AM-PAC 6 Clicks Score (PT) 22  - 23  -SF    Highest level of mobility 7 --> Walked 25 feet or more  - 7 --> Walked 25 feet or more  -      Row Name 08/11/23 1325          Functional Assessment    Outcome Measure Options AM-PAC 6 Clicks Basic Mobility (PT)  -               User Key  (r) = Recorded By, (t) = Taken By, (c) = Cosigned By      Initials Name Provider Type     Padmini Pollock, PT Physical Therapist    Audra Gilmna RN Registered Nurse                                 Physical Therapy Education       Title: PT OT SLP Therapies (In Progress)       Topic: Physical Therapy (In Progress)       Point: Mobility training (Done)       Learning Progress Summary             Patient Acceptance, E,TB,D, VU,NR by  at 8/11/2023 1326                         Point: Home exercise program (Not Started)       Learner Progress:  Not documented in this visit.              Point: Body mechanics (Done)       Learning Progress Summary             Patient Acceptance, E,TB,D, VU,NR by  at 8/11/2023 1326                         Point: Precautions (Done)       Learning Progress Summary             Patient Acceptance, E,TB,D, VU,NR by  at 8/11/2023 1326                                         User Key       Initials Effective Dates Name Provider Type [facility] 06/16/21 -  Padmini Pollock, PT Physical Therapist PT                  PT Recommendation and Plan     Plan of Care Reviewed With: patient  Outcome Evaluation: Pt is a 84 yo F who was admitted with new onset Afib. Pt wtih recent fall after slipping on a wet brick path resulting in R hip bruising. Pt demonstrates adequate strength and balance to perform functional mobility and gait independently. Pt with antalgic gait as she walks with a bit of limp. Pt reports she feels she is near her baseline and uses a cane as needed. Pt plans to return home this date. Acute care PT kassie sign off.     Time Calculation:          PT Charges       Row Name 08/11/23 1329             Time Calculation    Start Time 1131  -      Stop Time 1140  -      Time Calculation (min) 9 min  -      PT Received On 08/11/23  -                User Key  (r) = Recorded By, (t) = Taken By, (c) = Cosigned By      Initials Name Provider Type     Padmini Pollock, PT Physical Therapist                  Therapy Charges for Today       Code Description Service Date Service Provider Modifiers Qty    [id number] HC PT EVAL LOW COMPLEXITY 2 8/11/2023 Padmini Pollock, PT GP 1            PT G-Codes  Outcome Measure Options: AM-PAC 6 Clicks Basic Mobility (PT)  AM-PAC 6 Clicks Score (PT): 22  PT Discharge Summary  Anticipated Discharge Disposition (PT): home    Padmini Pollock, BUSHRA  8/11/2023

## 2023-08-11 NOTE — ED NOTES
Per EMS report, patient had a fall a few days ago onto her hip and has some bruising to the R hip that she would like to be evaluated.

## 2023-08-11 NOTE — PLAN OF CARE
Goal Outcome Evaluation:pt came to ed /obs unit for new onset of afib. Pt was seen by cardiac md and pt dressed self for discharge per self, declined assistance. Pt was assisted to discharge per staff in wheelchair for lyft ride. Pt states understanding of all meds and discharge instructions.

## 2023-08-11 NOTE — H&P
". [facility]   HISTORY AND PHYSICAL    Patient Name: Vikki Robledo  : 1940  MRN: [id number]  Primary Care Physician:  Jewel Arteaga MD  Date of admission: 8/10/2023    Subjective   Subjective     Chief Complaint: Elevated heart rate    HPI:    Vikki Robledo is a 83 y.o. female with past medical history including but not limited to hypertension, hyperlipidemia, arthritis, carotid stenosis and memory loss presents to [facility] with elevated heart rates and BP.  Patient reports that 4 months ago she had complete left hip replacement and was taken off her blood pressure.  Reports 4 weeks ago she seen her PCP and was advised to watch her BP and heart rate and to go to the emergency department if her heart rate goes above 100.  Tonight patient reports that she took her blood pressure and\" a bottom number was above 100\" therefore proceeded to the ED.    Patient denies any headache or lightheadedness.  Denies dysarthria, aphasia, visual disturbance.  Denies numbness, tingling or unilateral weakness.  Denies chest pain, palpitation, dyspnea.  Denies orthopnea or PND.  Denies abdominal pain, nausea, vomiting, or diarrhea.  Denies cough, fever, chills, lower extremity edema.  Denies dysuria, hematuria, or urinary frequency/urgency.    Vital signs in the ED : /90, , T98.8, RR 16 and O2 sat 99% on RA.    Initial evaluation in the ED include high-sensitivity troponin 16, INR 1.05, WBC 5.48, hemoglobin 13.9 and platelets 138.  EKG showed new onset A-fib with a rate of 125 therefore patient received 5 mg of IV Lopressor and 25 p.o.  Dr. Murphy with cardiology was consulted by the ED provider and she has started patient on Eliquis 5 mg and given additional dose of 25 mg of metoprolol p.o.      Review of Systems   All systems were reviewed and negative except for: The mentioned above in HPI    Personal History     Past Medical History:   Diagnosis Date    Arthritis     " Cataracts, bilateral     Fibrocystic breast     History of breast surgery     aspriaton of cyst x3    Hyperlipidemia     Hypertension     Memory loss        Past Surgical History:   Procedure Laterality Date    BREAST BIOPSY Bilateral     5 bx total ? how many on each breast last one in 80's  all were benign    BREAST SURGERY      aspriaton of cyst x3    CATARACT EXTRACTION, BILATERAL      CHOLECYSTECTOMY  2007    HERNIA REPAIR      x2    TONSILLECTOMY      UTERINE FIBROID EMBOLIZATION  1970       Family History: family history includes Alzheimer's disease in her maternal aunt, maternal uncle, mother, paternal aunt, and another family member; Pancreatic cancer in her father and another family member. Otherwise pertinent FHx was reviewed and not pertinent to current issue.    Social History:  reports that she has quit smoking. Her smoking use included cigarettes. She has never used smokeless tobacco. She reports current alcohol use. She reports that she does not use drugs.    Home Medications:  ciclopirox, doxycycline, ezetimibe, fluocinolone, nystatin, saccharomyces boulardii, uribel, vitamin B-12, and vitamin D    Allergies:  Allergies   Allergen Reactions    Amoxicillin Other (See Comments)     Skin redness    Penicillins Other (See Comments)     Cannot remember reaction    Statins Other (See Comments)     Myalgias       Objective   Objective     Vitals:   Temp:  [98.8 øF (37.1 øC)] 98.8 øF (37.1 øC)  Heart Rate:  [100-117] 100  Resp:  [16] 16  BP: (142-154)/(81-90) 142/87  Physical Exam    Constitutional: Awake, alert, in no acute distress   Eyes: PERRLA, sclerae anicteric, no conjunctival injection   HENT: NCAT, mucous membranes moist   Neck: Supple, no thyromegaly, no lymphadenopathy, trachea midline   Respiratory: Clear to auscultation bilaterally, nonlabored respirations    Cardiovascular: Irregularly irregular, no murmurs, rubs, or gallops, palpable pedal pulses bilaterally   Gastrointestinal: Positive bowel  sounds, soft, nontender, nondistended   Musculoskeletal: No bilateral ankle edema, no clubbing or cyanosis to extremities   Psychiatric: Appropriate affect, cooperative   Neurologic: Oriented x 3, strength symmetric in all extremities, Cranial Nerves grossly intact to confrontation, speech clear   Skin: No rashes     Result Review    Result Review:  I have personally reviewed the results from the time of this admission to 8/10/2023 23:55 EDT and agree with these findings:  []  Laboratory list / accordion  []  Microbiology  []  Radiology  []  EKG/Telemetry   []  Cardiology/Vascular   []  Pathology  []  Old records  []  Other:        Assessment & Plan   Assessment / Plan     Brief Patient Summary:  Vikki Robledo is a 83 y.o. female who was seen in the ED and found to be in A-fib therefore is being admitted to observation for further evaluation cardiology consult    Active Hospital Problems:  Active Hospital Problems    Diagnosis     **New onset a-fib      Plan:   New onset A-fib  -EKG shows A-fib  -Initially pt received 25 mg of p.o. Lopressor and 5 mg IV  -Dr. Murphy with cardiology was consulted and recommended to give an additional dose of metoprolol p.o. and to start patient on 5 mg of Eliquis  -Cardiac monitoring  -Echocardiogram  -Fall precaution      DVT prophylaxis:  Mechanical DVT prophylaxis orders are present.    CODE STATUS:    Level Of Support Discussed With: Patient  Code Status (Patient has no pulse and is not breathing): CPR (Attempt to Resuscitate)  Medical Interventions (Patient has pulse or is breathing): Full Support    Admission Status:  I believe this patient meets observation status.    72 minutes has been spent by [facility] providers in the care of this patient while under observation status    .During patient visit, I utilized appropriate personal protective equipment including gloves. Appropriate PPE was worn during the entire visit.  Hand hygiene was  completed before and after    Electronically signed by Marilu Carreon, EDNA, 08/10/23, 11:55 PM EDT.

## 2023-08-12 ENCOUNTER — NURSE TRIAGE (OUTPATIENT)
Dept: CALL CENTER | Facility: HOSPITAL | Age: 83
End: 2023-08-12
Payer: MEDICARE

## 2023-08-13 ENCOUNTER — NURSE TRIAGE (OUTPATIENT)
Dept: CALL CENTER | Facility: HOSPITAL | Age: 83
End: 2023-08-13
Payer: MEDICARE

## 2023-08-13 NOTE — TELEPHONE ENCOUNTER
Reason for Disposition   Caller has medicine question, adult has minor symptoms, caller declines triage, AND triager answers question    Additional Information   Negative: [1] Intentional drug overdose AND [2] suicidal thoughts or ideas   Negative: Drug overdose and triager unable to answer question   Negative: Caller requesting a renewal or refill of a medicine patient is currently taking   Negative: Caller requesting information unrelated to medicine   Negative: Caller requesting information about COVID-19 Vaccine   Negative: Caller requesting information about Emergency Contraception   Negative: Caller requesting information about Combined Birth Control Pills   Negative: Caller requesting information about Progestin Birth Control Pills   Negative: Caller requesting information about Post-Op pain or medicines   Negative: Caller requesting a prescription antibiotic (such as Penicillin) for Strep throat and has a positive culture result   Negative: Caller requesting a prescription anti-viral med (such as Tamiflu) and has influenza (flu) symptoms   Negative: Immunization reaction suspected   Negative: Rash while taking a medicine or within 3 days of stopping it   Negative: [1] Asthma and [2] having symptoms of asthma (cough, wheezing, etc.)   Negative: [1] Symptom of illness (e.g., headache, abdominal pain, earache, vomiting) AND [2] more than mild   Negative: Breastfeeding questions about mother's medicines and diet   Negative: MORE THAN A DOUBLE DOSE of a prescription or over-the-counter (OTC) drug   Negative: [1] DOUBLE DOSE (an extra dose or lesser amount) of prescription drug AND [2] any symptoms (e.g., dizziness, nausea, pain, sleepiness)   Negative: [1] DOUBLE DOSE (an extra dose or lesser amount) of over-the-counter (OTC) drug AND [2] any symptoms (e.g., dizziness, nausea, pain, sleepiness)   Negative: Took another person's prescription drug   Negative: [1] DOUBLE DOSE (an extra dose or lesser amount) of  "prescription drug AND [2] NO symptoms  (Exception: A double dose of antibiotics.)   Negative: Diabetes drug error or overdose (e.g., took wrong type of insulin or took extra dose)   Negative: [1] Prescription not at pharmacy AND [2] was prescribed by PCP recently (Exception: Triager has access to EMR and prescription is recorded there. Go to Home Care and confirm for pharmacy.)   Negative: [1] Pharmacy calling with prescription question AND [2] triager unable to answer question   Negative: [1] Caller has URGENT medicine question about med that PCP or specialist prescribed AND [2] triager unable to answer question   Negative: Medicine patch causing local rash or itching   Negative: [1] Caller has medicine question about med NOT prescribed by PCP AND [2] triager unable to answer question (e.g., compatibility with other med, storage)   Negative: Prescription request for new medicine (not a refill)   Negative: [1] Caller has NON-URGENT medicine question about med that PCP prescribed AND [2] triager unable to answer question   Negative: Caller wants to use a complementary or alternative medicine   Negative: [1] Prescription prescribed recently is not at pharmacy AND [2] triager has access to patient's EMR AND [3] prescription is recorded in the EMR   Negative: [1] DOUBLE DOSE (an extra dose or lesser amount) of over-the-counter (OTC) drug AND [2] NO symptoms   Negative: [1] DOUBLE DOSE (an extra dose or lesser amount) of antibiotic drug AND [2] NO symptoms   Negative: Caller has medicine question only, adult not sick, AND triager answers question    Answer Assessment - Initial Assessment Questions  1. NAME of MEDICINE: \"What medicine(s) are you calling about?\"      Apixiban, atenolol  2. QUESTION: \"What is your question?\" (e.g., double dose of medicine, side effect)      When to take it  3. PRESCRIBER: \"Who prescribed the medicine?\" Reason: if prescribed by specialist, call should be referred to that group.      na  4. " "SYMPTOMS: \"Do you have any symptoms?\" If Yes, ask: \"What symptoms are you having?\"  \"How bad are the symptoms (e.g., mild, moderate, severe)      Afib, HTN  5. PREGNANCY:  \"Is there any chance that you are pregnant?\" \"When was your last menstrual period?\"      na    Protocols used: Medication Question Call-ADULT-    "

## 2023-08-13 NOTE — TELEPHONE ENCOUNTER
Explained to call that she can take the meds at any time of the morning she wants as long as she will be awake 12 hour from then to take it at night.  She decided 12 pm and 12 am is a good schedule for her.

## 2023-08-13 NOTE — TELEPHONE ENCOUNTER
Went over AVS medication instruction with caller. Caller has missed two doses of her Eliquis and was advised to take now and take b/p before taking her Tenormin tonight. Caller denies any symptoms at this time. Caller has number for provider. Advised to call back as needed.             Reason for Disposition   [1] Caller has URGENT medicine question about med that PCP or specialist prescribed AND [2] triager unable to answer question    Additional Information   Negative: [1] Intentional drug overdose AND [2] suicidal thoughts or ideas   Negative: Drug overdose and triager unable to answer question   Negative: Caller requesting a renewal or refill of a medicine patient is currently taking   Negative: Caller requesting information unrelated to medicine   Negative: Caller requesting information about COVID-19 Vaccine   Negative: Caller requesting information about Emergency Contraception   Negative: Caller requesting information about Combined Birth Control Pills   Negative: Caller requesting information about Progestin Birth Control Pills   Negative: Caller requesting information about Post-Op pain or medicines   Negative: Caller requesting a prescription antibiotic (such as Penicillin) for Strep throat and has a positive culture result   Negative: Caller requesting a prescription anti-viral med (such as Tamiflu) and has influenza (flu) symptoms   Negative: Immunization reaction suspected   Negative: Rash while taking a medicine or within 3 days of stopping it   Negative: [1] Asthma and [2] having symptoms of asthma (cough, wheezing, etc.)   Negative: [1] Symptom of illness (e.g., headache, abdominal pain, earache, vomiting) AND [2] more than mild   Negative: Breastfeeding questions about mother's medicines and diet   Negative: MORE THAN A DOUBLE DOSE of a prescription or over-the-counter (OTC) drug   Negative: [1] DOUBLE DOSE (an extra dose or lesser amount) of prescription drug AND [2] any symptoms (e.g., dizziness,  "nausea, pain, sleepiness)   Negative: [1] DOUBLE DOSE (an extra dose or lesser amount) of over-the-counter (OTC) drug AND [2] any symptoms (e.g., dizziness, nausea, pain, sleepiness)   Negative: Took another person's prescription drug   Negative: [1] DOUBLE DOSE (an extra dose or lesser amount) of prescription drug AND [2] NO symptoms  (Exception: A double dose of antibiotics.)   Negative: Diabetes drug error or overdose (e.g., took wrong type of insulin or took extra dose)   Negative: [1] Prescription not at pharmacy AND [2] was prescribed by PCP recently (Exception: Triager has access to EMR and prescription is recorded there. Go to Home Care and confirm for pharmacy.)   Negative: [1] Pharmacy calling with prescription question AND [2] triager unable to answer question    Answer Assessment - Initial Assessment Questions  1. NAME of MEDICINE: \"What medicine(s) are you calling about?\"      I've just now noticed I've missed two doses   2. QUESTION: \"What is your question?\" (e.g., double dose of medicine, side effect)      Asking which medication is blood thinner and which is for blood pressure   3. PRESCRIBER: \"Who prescribed the medicine?\" Reason: if prescribed by specialist, call should be referred to that group.        4. SYMPTOMS: \"Do you have any symptoms?\" If Yes, ask: \"What symptoms are you having?\"  \"How bad are the symptoms (e.g., mild, moderate, severe)      Denies   5. PREGNANCY:  \"Is there any chance that you are pregnant?\" \"When was your last menstrual period?\"    Protocols used: Medication Question Call-ADULT-    "

## 2023-08-14 ENCOUNTER — TRANSITIONAL CARE MANAGEMENT TELEPHONE ENCOUNTER (OUTPATIENT)
Dept: CALL CENTER | Facility: HOSPITAL | Age: 83
End: 2023-08-14
Payer: MEDICARE

## 2023-08-14 NOTE — OUTREACH NOTE
"Call Center TCM Note      Flowsheet Row Responses   [facility] patient discharged from? [place]   Does the patient have one of the following disease processes/diagnoses(primary or secondary)? Other   TCM attempt successful? Yes   Call start time 0950   Call end time 1007   Discharge diagnosis New onset a-fib   Meds reviewed with patient/caregiver? Yes   Is the patient having any side effects they believe may be caused by any medication additions or changes? No   Does the patient have all medications ordered at discharge? Yes   Is the patient taking all medications as directed (includes completed medication regime)? Yes   Comments Hosp dc fu apt 8/25/23 with PCP - pt states she will speak to PCP re: referral to cards (no longer sees Dr. Haines)   Does the patient have an appointment with their PCP within 7-14 days of discharge? Yes   Has home health visited the patient within 72 hours of discharge? N/A   Psychosocial issues? No   Did the patient receive a copy of their discharge instructions? Yes   Nursing interventions Reviewed instructions with patient   What is the patient's perception of their health status since discharge? Improving  [\"tired\",  pt reviewed latest BP/HR readings to nurse ]   Is the patient/caregiver able to teach back signs and symptoms related to disease process for when to call PCP? Yes   Is the patient/caregiver able to teach back signs and symptoms related to disease process for when to call 911? Yes   Is the patient/caregiver able to teach back the hierarchy of who to call/visit for symptoms/problems? PCP, Specialist, Home health nurse, Urgent Care, ED, 911 Yes   If the patient is a current smoker, are they able to teach back resources for cessation? Not a smoker   TCM call completed? Yes   Call end time 1007            Luann Mercer RN    8/14/2023, 10:08 EDT        "

## 2023-08-17 ENCOUNTER — TELEPHONE (OUTPATIENT)
Dept: CASE MANAGEMENT | Facility: OTHER | Age: 83
End: 2023-08-17
Payer: MEDICARE

## 2023-08-18 ENCOUNTER — PATIENT OUTREACH (OUTPATIENT)
Dept: CASE MANAGEMENT | Facility: OTHER | Age: 83
End: 2023-08-18
Payer: MEDICARE

## 2023-08-18 DIAGNOSIS — E78.2 MIXED HYPERLIPIDEMIA: ICD-10-CM

## 2023-08-18 DIAGNOSIS — I10 PRIMARY HYPERTENSION: ICD-10-CM

## 2023-08-18 DIAGNOSIS — I48.91 NEW ONSET A-FIB: Primary | ICD-10-CM

## 2023-08-18 NOTE — OUTREACH NOTE
AMBULATORY CASE MANAGEMENT NOTE    Name and Relationship of Patient/Support Person: Jaquan Robledo Vikki Jessica - Self    CCM Interim Update    Spoke with patient at this time regarding recent hospitalization, identified self and role.  Patient states she is doing ok.  She reports that she has been checking her heart rate at home since she was released from the hospital.  She reports that her heart rate has been as follows since then: 107, 108, 99, 67, 60, 53, 72, 68, 70.  Patient denies SOB or chest pain.  Patient states she went to the ER because she had noticed that her heart rate was above 100 when she checked it at home and was told by a family member that if that ever happened she needed to go to the ER.    Patient was started on Eliquis and Atenolol on discharge and reports that she is compliant with her medications.  Patient asks if she is ok driving, denies any issues with mobility in arms or legs, dizziness, lightheadedness, vision or auditory changes, confusion, etc.  Says she was driving prior to hospital stay.  Explained that if she was driving before and is denying any of the above issues then she should be fine to continue driving unless told otherwise by hospital or one of her physicians.    Discussed assistance with chronic disease management including new-onset Afib through CCM, patient declines at this time.  She states she feels as though she is doing fine for now.  Advised patient to contact CM or office if she changes her mind or has any needs or concerns.  PCP f/u scheduled, AWV scheduled.  No further needs noted, will close program.        Megan ROBLES  Ambulatory Case Management    8/18/2023, 11:03 EDT

## 2023-08-25 ENCOUNTER — OFFICE VISIT (OUTPATIENT)
Dept: INTERNAL MEDICINE | Facility: CLINIC | Age: 83
End: 2023-08-25
Payer: MEDICARE

## 2023-08-25 VITALS
DIASTOLIC BLOOD PRESSURE: 72 MMHG | SYSTOLIC BLOOD PRESSURE: 118 MMHG | OXYGEN SATURATION: 99 % | WEIGHT: 151 LBS | HEART RATE: 63 BPM | BODY MASS INDEX: 27.62 KG/M2

## 2023-08-25 DIAGNOSIS — I10 BENIGN ESSENTIAL HTN: ICD-10-CM

## 2023-08-25 DIAGNOSIS — I48.0 PAROXYSMAL ATRIAL FIBRILLATION: Primary | ICD-10-CM

## 2023-08-25 DIAGNOSIS — M20.62 TOE DEFORMITY, ACQUIRED, LEFT: ICD-10-CM

## 2023-08-25 NOTE — PROGRESS NOTES
Chief Complaint  Hospital Follow Up Visit  Admit 8/10/23 and discharge 8/11/23  Subjective        Vikki Robledo presents to [facility]  History of Present Illness  Vikki Jessica is a delightful lady who was found to have atrial fibrillation upon transport to the hospital and subsequently had rate control with atenolol 25 mg twice daily which is effective for blood pressure and also heart rate controlling.  She is on Eliquis 5 mg twice daily for anticoagulation related to atrial fibrillation pending follow-up with Dr. Henrry Barrera electrophysiology.    She will follow-up with Dr. Henrry Leo concerning the overlapping left second and third toes over the great toe and we will see how his eventual surgery will go as far as helping the foot mechanics.    Transitional Care Management Certification  I certify that the following are true:  Communication was made within 2 business days of discharge.  Complexity of Medical Decision Making is moderate.  Face to face visit occurred within 14 days.    *Note: 46276 is for high complexity patients with a face to face visit within 7 days of discharge.  53693 is for high complexity patients with a face to face on days 8-14 post discharge or medium complexity with face to face visit within 14 days post discharge.    Transitional Care Management Certification  I certify that the following are true:  Communication was made within 2 business days of discharge.  Complexity of Medical Decision Making is high.  Face to face visit occurred within 14 days.    *Note: 57250 is for high complexity patients with a face to face visit within 7 days of discharge.  77515 is for high complexity patients with a face to face on days 8-14 post discharge or medium complexity with face to face visit within 14 days post discharge.        Objective   Vital Signs:  /72 (BP Location: Left arm, Patient Position: Sitting, Cuff Size: Adult)   Pulse 63   Wt 68.5 kg  "(151 lb)   SpO2 99%   BMI 27.62 kg/mý   Estimated body mass index is 27.62 kg/mý as calculated from the following:    Height as of 8/11/23: 157.5 cm (62\").    Weight as of this encounter: 68.5 kg (151 lb).             Physical Exam  Vitals reviewed.   Constitutional:       Appearance: She is well-developed.   HENT:      Head: Normocephalic and atraumatic.      Right Ear: Tympanic membrane and external ear normal.      Left Ear: Tympanic membrane and external ear normal.      Nose: Nose normal.   Eyes:      Conjunctiva/sclera: Conjunctivae normal.      Pupils: Pupils are equal, round, and reactive to light.   Neck:      Thyroid: No thyromegaly.      Vascular: No JVD.   Cardiovascular:      Rate and Rhythm: Normal rate. Rhythm irregularly irregular.      Heart sounds: Normal heart sounds.   Pulmonary:      Effort: Pulmonary effort is normal.      Breath sounds: Normal breath sounds.   Abdominal:      General: Bowel sounds are normal.      Palpations: Abdomen is soft.   Musculoskeletal:         General: Normal range of motion.      Cervical back: Normal range of motion and neck supple.   Lymphadenopathy:      Cervical: No cervical adenopathy.   Skin:     General: Skin is warm and dry.      Findings: No rash.   Neurological:      Mental Status: She is alert and oriented to person, place, and time.      Cranial Nerves: No cranial nerve deficit.      Coordination: Coordination normal.   Psychiatric:         Behavior: Behavior normal.         Thought Content: Thought content normal.         Judgment: Judgment normal.      Result Review :  The following data was reviewed by: Jewel Arteaga MD on 08/25/2023:  Common labs          3/16/2023    02:09 3/17/2023    06:26 8/10/2023    22:24   Common Labs   Glucose   95    BUN   15    Creatinine   0.79    Sodium   145    Potassium   3.8    Chloride   109    Calcium   8.7    Albumin   3.5    Total Bilirubin   0.6    Alkaline Phosphatase   77    AST (SGOT)   26    ALT (SGPT)   12  "   WBC 12.88     8.16     5.48    Hemoglobin 12.6     11.0     13.9    Hematocrit 38.3     33.7     42.6    Platelets 141     109     138       Details          This result is from an external source.                          Assessment and Plan   Diagnoses and all orders for this visit:    1. Paroxysmal atrial fibrillation (Primary)  Comments:  Referral to cardiology in the meantime continue current dose of Eliquis twice daily plus atenolol 25 mg twice daily.  Orders:  -     Ambulatory Referral to Cardiology    2. Benign essential HTN  Comments:  Controlled with atenolol 25 mg twice daily    3. Toe deformity, acquired, left  Comments:  Follow-up with Dr. Henrry Leo             Follow Up   No follow-ups on file.  Patient was given instructions and counseling regarding her condition or for health maintenance advice. Please see specific information pulled into the AVS if appropriate.

## 2023-09-06 RX ORDER — ATENOLOL 25 MG/1
25 TABLET ORAL EVERY 12 HOURS SCHEDULED
Qty: 60 TABLET | Refills: 0 | Status: CANCELLED | OUTPATIENT
Start: 2023-09-06

## 2023-09-06 RX ORDER — APIXABAN 5 MG/1
5 TABLET, FILM COATED ORAL EVERY 12 HOURS SCHEDULED
Qty: 60 TABLET | Refills: 0 | Status: CANCELLED | OUTPATIENT
Start: 2023-09-06

## 2023-09-06 RX ORDER — ATENOLOL 25 MG/1
25 TABLET ORAL EVERY 12 HOURS SCHEDULED
Qty: 60 TABLET | Refills: 0 | Status: SHIPPED | OUTPATIENT
Start: 2023-09-06

## 2023-09-06 NOTE — TELEPHONE ENCOUNTER
Caller: Vikki Dye    Relationship: Self    Best call back number: [phone]     Requested Prescriptions:   Requested Prescriptions     Pending Prescriptions Disp Refills    atenolol (TENORMIN) 25 MG tablet 60 tablet 0     Sig: Take 1 tablet by mouth Every 12 (Twelve) Hours.    apixaban (ELIQUIS) 5 MG tablet tablet 60 tablet 0     Sig: Take 1 tablet by mouth Every 12 (Twelve) Hours. Indications: Atrial Fibrillation        Pharmacy where request should be sent: [facility][id number] [place] [id number] [facility] AT [facility] [phone] [facility] [phone] FX     Last office visit with prescribing clinician: 8/25/2023   Last telemedicine visit with prescribing clinician: Visit date not found   Next office visit with prescribing clinician: 11/3/2023     Additional details provided by patient: PREVIOUSLY PRESCRIBED BY HOSPITAL.     Does the patient have less than a 3 day supply:  [x] Yes  [] No    Would you like a call back once the refill request has been completed: [x] Yes [] No    If the office needs to give you a call back, can they leave a voicemail: [] Yes [x] No    Rd Pelletier   09/06/23 12:07 EDT

## 2023-09-16 ENCOUNTER — NURSE TRIAGE (OUTPATIENT)
Dept: CALL CENTER | Facility: HOSPITAL | Age: 83
End: 2023-09-16
Payer: MEDICARE

## 2023-09-16 NOTE — TELEPHONE ENCOUNTER
Caller took her meds an hour late and is wanting to know if it is ok to take the night dose at her regular time.  Meds reviewed with caller and questions answered.

## 2023-09-16 NOTE — TELEPHONE ENCOUNTER
Reason for Disposition   Caller has medicine question only, adult not sick, AND triager answers question    Additional Information   Negative: [1] Intentional drug overdose AND [2] suicidal thoughts or ideas   Negative: Drug overdose and triager unable to answer question   Negative: Caller requesting a renewal or refill of a medicine patient is currently taking   Negative: Caller requesting information unrelated to medicine   Negative: Caller requesting information about COVID-19 Vaccine   Negative: Caller requesting information about Emergency Contraception   Negative: Caller requesting information about Combined Birth Control Pills   Negative: Caller requesting information about Progestin Birth Control Pills   Negative: Caller requesting information about Post-Op pain or medicines   Negative: Caller requesting a prescription antibiotic (such as Penicillin) for Strep throat and has a positive culture result   Negative: Caller requesting a prescription anti-viral med (such as Tamiflu) and has influenza (flu) symptoms   Negative: Immunization reaction suspected   Negative: Rash while taking a medicine or within 3 days of stopping it   Negative: [1] Asthma and [2] having symptoms of asthma (cough, wheezing, etc.)   Negative: [1] Symptom of illness (e.g., headache, abdominal pain, earache, vomiting) AND [2] more than mild   Negative: Breastfeeding questions about mother's medicines and diet   Negative: MORE THAN A DOUBLE DOSE of a prescription or over-the-counter (OTC) drug   Negative: [1] DOUBLE DOSE (an extra dose or lesser amount) of prescription drug AND [2] any symptoms (e.g., dizziness, nausea, pain, sleepiness)   Negative: [1] DOUBLE DOSE (an extra dose or lesser amount) of over-the-counter (OTC) drug AND [2] any symptoms (e.g., dizziness, nausea, pain, sleepiness)   Negative: Took another person's prescription drug   Negative: [1] DOUBLE DOSE (an extra dose or lesser amount) of prescription drug AND [2] NO  "symptoms  (Exception: A double dose of antibiotics.)   Negative: Diabetes drug error or overdose (e.g., took wrong type of insulin or took extra dose)   Negative: [1] Prescription not at pharmacy AND [2] was prescribed by PCP recently (Exception: Triager has access to EMR and prescription is recorded there. Go to Home Care and confirm for pharmacy.)   Negative: [1] Pharmacy calling with prescription question AND [2] triager unable to answer question   Negative: [1] Caller has URGENT medicine question about med that PCP or specialist prescribed AND [2] triager unable to answer question   Negative: Medicine patch causing local rash or itching   Negative: [1] Caller has medicine question about med NOT prescribed by PCP AND [2] triager unable to answer question (e.g., compatibility with other med, storage)   Negative: Prescription request for new medicine (not a refill)   Negative: [1] Caller has NON-URGENT medicine question about med that PCP prescribed AND [2] triager unable to answer question   Negative: Caller wants to use a complementary or alternative medicine   Negative: [1] Prescription prescribed recently is not at pharmacy AND [2] triager has access to patient's EMR AND [3] prescription is recorded in the EMR   Negative: [1] DOUBLE DOSE (an extra dose or lesser amount) of over-the-counter (OTC) drug AND [2] NO symptoms   Negative: [1] DOUBLE DOSE (an extra dose or lesser amount) of antibiotic drug AND [2] NO symptoms    Answer Assessment - Initial Assessment Questions  1. NAME of MEDICINE: \"What medicine(s) are you calling about?\"      Eliquis and atenolol  2. QUESTION: \"What is your question?\" (e.g., double dose of medicine, side effect)      I took them an hour late today, can I go on and take them at midnight like usual?  3. PRESCRIBER: \"Who prescribed the medicine?\" Reason: if prescribed by specialist, call should be referred to that group.      Dr. orlando  4. SYMPTOMS: \"Do you have any symptoms?\" If Yes, " "ask: \"What symptoms are you having?\"  \"How bad are the symptoms (e.g., mild, moderate, severe)      no  5. PREGNANCY:  \"Is there any chance that you are pregnant?\" \"When was your last menstrual period?\"      na    Protocols used: Medication Question Call-ADULT-    "

## 2023-09-20 ENCOUNTER — NURSE TRIAGE (OUTPATIENT)
Dept: CALL CENTER | Facility: HOSPITAL | Age: 83
End: 2023-09-20
Payer: MEDICARE

## 2023-09-20 NOTE — TELEPHONE ENCOUNTER
Patient unsure if she took her Atenolol and Eliquis as scheduled. She takes them at noon at midnight, but is unsure if she took today's noon dose. Advised patient to resume normal schedule and take medications at midnight to ensure that she does not take an extra dose. Patient verbalized understanding.    Reason for Disposition   Caller has medicine question only, adult not sick, AND triager answers question    Additional Information   Negative: [1] Intentional drug overdose AND [2] suicidal thoughts or ideas   Negative: Drug overdose and triager unable to answer question   Negative: Caller requesting a renewal or refill of a medicine patient is currently taking   Negative: Caller requesting information unrelated to medicine   Negative: Caller requesting information about COVID-19 Vaccine   Negative: Caller requesting information about Emergency Contraception   Negative: Caller requesting information about Combined Birth Control Pills   Negative: Caller requesting information about Progestin Birth Control Pills   Negative: Caller requesting information about Post-Op pain or medicines   Negative: Caller requesting a prescription antibiotic (such as Penicillin) for Strep throat and has a positive culture result   Negative: Caller requesting a prescription anti-viral med (such as Tamiflu) and has influenza (flu) symptoms   Negative: Immunization reaction suspected   Negative: Rash while taking a medicine or within 3 days of stopping it   Negative: [1] Asthma and [2] having symptoms of asthma (cough, wheezing, etc.)   Negative: [1] Symptom of illness (e.g., headache, abdominal pain, earache, vomiting) AND [2] more than mild   Negative: Breastfeeding questions about mother's medicines and diet   Negative: MORE THAN A DOUBLE DOSE of a prescription or over-the-counter (OTC) drug   Negative: [1] DOUBLE DOSE (an extra dose or lesser amount) of prescription drug AND [2] any symptoms (e.g., dizziness, nausea, pain,  "sleepiness)   Negative: [1] DOUBLE DOSE (an extra dose or lesser amount) of over-the-counter (OTC) drug AND [2] any symptoms (e.g., dizziness, nausea, pain, sleepiness)   Negative: Took another person's prescription drug   Negative: [1] DOUBLE DOSE (an extra dose or lesser amount) of prescription drug AND [2] NO symptoms  (Exception: A double dose of antibiotics.)   Negative: Diabetes drug error or overdose (e.g., took wrong type of insulin or took extra dose)   Negative: [1] Prescription not at pharmacy AND [2] was prescribed by PCP recently (Exception: Triager has access to EMR and prescription is recorded there. Go to Home Care and confirm for pharmacy.)   Negative: [1] Pharmacy calling with prescription question AND [2] triager unable to answer question   Negative: [1] Caller has URGENT medicine question about med that PCP or specialist prescribed AND [2] triager unable to answer question   Negative: Medicine patch causing local rash or itching   Negative: [1] Caller has medicine question about med NOT prescribed by PCP AND [2] triager unable to answer question (e.g., compatibility with other med, storage)   Negative: Prescription request for new medicine (not a refill)   Negative: [1] Caller has NON-URGENT medicine question about med that PCP prescribed AND [2] triager unable to answer question   Negative: Caller wants to use a complementary or alternative medicine   Negative: [1] Prescription prescribed recently is not at pharmacy AND [2] triager has access to patient's EMR AND [3] prescription is recorded in the EMR   Negative: [1] DOUBLE DOSE (an extra dose or lesser amount) of over-the-counter (OTC) drug AND [2] NO symptoms   Negative: [1] DOUBLE DOSE (an extra dose or lesser amount) of antibiotic drug AND [2] NO symptoms    Answer Assessment - Initial Assessment Questions  1. NAME of MEDICINE: \"What medicine(s) are you calling about?\"      Atenolol and Eliquis  2. QUESTION: \"What is your question?\" (e.g., " "double dose of medicine, side effect)      Unsure if she took it at noon today  3. PRESCRIBER: \"Who prescribed the medicine?\" Reason: if prescribed by specialist, call should be referred to that group.      See chart  4. SYMPTOMS: \"Do you have any symptoms?\" If Yes, ask: \"What symptoms are you having?\"  \"How bad are the symptoms (e.g., mild, moderate, severe)      NA  5. PREGNANCY:  \"Is there any chance that you are pregnant?\" \"When was your last menstrual period?\"      NA    Protocols used: Medication Question Call-ADULT-    "

## 2023-09-22 ENCOUNTER — NURSE TRIAGE (OUTPATIENT)
Dept: CALL CENTER | Facility: HOSPITAL | Age: 83
End: 2023-09-22
Payer: MEDICARE

## 2023-09-22 NOTE — TELEPHONE ENCOUNTER
"Wishes to review BID status of her medications. Med list reviewed and clarified. All questions answered.   Reason for Disposition   Health Information question, no triage required and triager able to answer question    Additional Information   Negative: [1] Caller is not with the adult (patient) AND [2] reporting urgent symptoms   Negative: Lab result questions   Negative: Medication questions   Negative: Caller can't be reached by phone   Negative: Caller has already spoken to PCP or another triager   Negative: RN needs further essential information from caller in order to complete triage   Negative: Requesting regular office appointment   Negative: [1] Caller requesting NON-URGENT health information AND [2] PCP's office is the best resource    Answer Assessment - Initial Assessment Questions  1. REASON FOR CALL or QUESTION: \"What is your reason for calling today?\" or \"How can I best help you?\" or \"What question do you have that I can help answer?\"      Wishes to review BID status of her medications. Med list reviewed and clarified. All questions answered.    Protocols used: Information Only Call - No Triage-ADULT-    "

## 2023-10-03 ENCOUNTER — TELEPHONE (OUTPATIENT)
Dept: CARDIOLOGY | Facility: CLINIC | Age: 83
End: 2023-10-03
Payer: MEDICARE

## 2023-10-03 NOTE — TELEPHONE ENCOUNTER
Caller: Vikki Dye    Relationship: Self    Best call back number: [phone]    What is the best time to reach you: ANYTIME    Who are you requesting to speak with (clinical staff, provider,  specific staff member): CLINICAL STAFF    Do you know the name of the person who called: NA    What was the call regarding: CLARIFICATION OF APPOINTMENT TIME     Is it okay if the provider responds through MyChart: PLEASE CALL PATIENT

## 2023-10-04 NOTE — TELEPHONE ENCOUNTER
Spoke with patient.    Confirmed appt time moved to 12PM due to Dr. Cano having a procedure at 9am.

## 2023-10-05 ENCOUNTER — TELEPHONE (OUTPATIENT)
Dept: INTERNAL MEDICINE | Facility: CLINIC | Age: 83
End: 2023-10-05
Payer: MEDICARE

## 2023-10-05 ENCOUNTER — OFFICE VISIT (OUTPATIENT)
Age: 83
End: 2023-10-05
Payer: MEDICARE

## 2023-10-05 VITALS
HEART RATE: 102 BPM | BODY MASS INDEX: 27.07 KG/M2 | SYSTOLIC BLOOD PRESSURE: 120 MMHG | DIASTOLIC BLOOD PRESSURE: 86 MMHG | WEIGHT: 148 LBS

## 2023-10-05 DIAGNOSIS — I48.19 ATRIAL FIBRILLATION, PERSISTENT: Primary | ICD-10-CM

## 2023-10-05 PROCEDURE — 99214 OFFICE O/P EST MOD 30 MIN: CPT | Performed by: INTERNAL MEDICINE

## 2023-10-05 PROCEDURE — 93000 ELECTROCARDIOGRAM COMPLETE: CPT | Performed by: INTERNAL MEDICINE

## 2023-10-05 PROCEDURE — 3079F DIAST BP 80-89 MM HG: CPT | Performed by: INTERNAL MEDICINE

## 2023-10-05 PROCEDURE — 3074F SYST BP LT 130 MM HG: CPT | Performed by: INTERNAL MEDICINE

## 2023-10-05 NOTE — PROGRESS NOTES
Date of Office Visit: 10/05/2023  Encounter Provider: Marcin Cano MD  Place of Service: [facility] CARDIOLOGY  Patient Name: Vikki Robledo  :1940    Chief Complaint   Patient presents with    Atrial Fibrillation     New Patient Consult / Dr. Arteaga ref   :     HPI: Vikki Robledo is a 83 y.o. female who presents today for atrial fibrillation.     She was found to be in atrial fibrillation in August.      It was discovered because she had received a new blood pressure monitor and noted an elevated heart rate.     She had no symptoms.     She was placed on atenolol and apixaban, and seems to have done fine.     She lives on [address], by herself I think.            Past Medical History:   Diagnosis Date    Arthritis     Cataracts, bilateral     Fibrocystic breast     History of breast surgery     aspriaton of cyst x3    Hyperlipidemia     Hypertension     Memory loss        Past Surgical History:   Procedure Laterality Date    BREAST BIOPSY Bilateral     5 bx total ? how many on each breast last one in 80's  all were benign    BREAST SURGERY      aspriaton of cyst x3    CATARACT EXTRACTION, BILATERAL      CHOLECYSTECTOMY  2007    HERNIA REPAIR      x2    TONSILLECTOMY      UTERINE FIBROID EMBOLIZATION  1970       Social History     Socioeconomic History    Marital status:    Tobacco Use    Smoking status: Former     Types: Cigarettes    Smokeless tobacco: Never   Vaping Use    Vaping Use: Never used   Substance and Sexual Activity    Alcohol use: Yes     Comment: One or more times monthly     Drug use: Never    Sexual activity: Defer       Family History   Problem Relation Age of Onset    Alzheimer's disease Other     Pancreatic cancer Other     Alzheimer's disease Mother     Pancreatic cancer Father     Alzheimer's disease Maternal Aunt     Alzheimer's disease Maternal Uncle     Alzheimer's disease Paternal Aunt        Review of Systems    Constitutional: Negative.   Cardiovascular: Negative.    Respiratory: Negative.     Gastrointestinal: Negative.      Allergies   Allergen Reactions    Amoxicillin Other (See Comments)     Skin redness    Penicillins Other (See Comments)     Cannot remember reaction    Statins Other (See Comments)     Myalgias         Current Outpatient Medications:     apixaban (ELIQUIS) 5 MG tablet tablet, Take 1 tablet by mouth Every 12 (Twelve) Hours. Indications: Atrial Fibrillation, Disp: 60 tablet, Rfl: 0    atenolol (TENORMIN) 25 MG tablet, Take 1 tablet by mouth Every 12 (Twelve) Hours., Disp: 60 tablet, Rfl: 0    ciclopirox (LOPROX) 1 % shampoo, Shampoo into scalp and let sit on for 5 minutes 3 times per week., Disp: , Rfl: 0    Florastor 250 MG capsule, Take 1 capsule by mouth Daily., Disp: 50 capsule, Rfl: 5    fluocinolone (SYNALAR) 0.01 % external solution, APPLY D TO THE SCALP, Disp: , Rfl:     vitamin D (ERGOCALCIFEROL) 1.25 MG (62846 UT) capsule capsule, Take 1 capsule by mouth Every 14 (Fourteen) Days., Disp: 8 capsule, Rfl: 3      Objective:     Vitals:    10/05/23 1152   BP: 120/86   Pulse: 102   Weight: 67.1 kg (148 lb)     Body mass index is 27.07 kg/m².    PHYSICAL EXAM:    Vitals and nursing note reviewed.   Constitutional:       General: Not in acute distress.     Comments: She uses a can for ambulation   Pulmonary:      Effort: Pulmonary effort is normal. No respiratory distress.   Cardiovascular:      Normal rate. Regular rhythm.   Edema:     Peripheral edema absent.   Skin:     General: Skin is warm and dry.   Neurological:      Mental Status: Alert and oriented to person, place, and time.   Psychiatric:         Behavior: Behavior normal.         Thought Content: Thought content normal.         Cognition and Memory: Memory is impaired.         Judgment: Judgment normal.      Comments: I think she has some subtle cognitive dysfunction           ECG 12 Lead    Date/Time: 10/5/2023 12:38 PM  Performed by:  Marcin Cano MD  Authorized by: Marcin Cano MD   Comparison: compared with previous ECG from 8/11/2023  Similar to previous ECG  Rhythm: atrial fibrillation          Assessment:       Diagnosis Plan   1. Atrial fibrillation, persistent               Plan:       I think a rate control strategy is best for her.     She seems to be doing well, I would continue her current atenolol and eliquis.      Her rate control is adequate, generally less than 110 bpm at rest.  Do not need tight rate control in this elderly frail patient.     If she has trouble, pace and ablate with CSP    Follow-up in one year.     As always, it has been a pleasure to participate in your patient's care.      Sincerely,         Marcin Cano MD

## 2023-10-05 NOTE — TELEPHONE ENCOUNTER
Caller: Vikki Dye    Relationship: Self    Best call back number: [phone]    What was the call regarding: PATIENT STATED THAT THEY WOULD LIKE A CALL TO CONFIRM WHEN THE NEXT APPOINTMENT WITH DR. MISTRY. WOULD LIKE TO SPEAK WITH SOMEONE TO CONFIRM IF IT IS 11/3/23 OR 10/6/23. INFORMED PATIENT THAT APPOINTMENT IS ON 11/3/23. PLEASE CALL AND CONFIRM FOR PATIENT

## 2023-10-12 ENCOUNTER — PATIENT ROUNDING (BHMG ONLY) (OUTPATIENT)
Age: 83
End: 2023-10-12
Payer: MEDICARE

## 2023-10-12 NOTE — PROGRESS NOTES
A My Chart message has been sent to the patient for PATIENT ROUNDING with [facility]

## 2023-10-28 DIAGNOSIS — I10 BENIGN ESSENTIAL HTN: Primary | ICD-10-CM

## 2023-10-28 DIAGNOSIS — I48.19 ATRIAL FIBRILLATION, PERSISTENT: ICD-10-CM

## 2023-10-30 RX ORDER — APIXABAN 5 MG/1
TABLET, FILM COATED ORAL
Qty: 60 TABLET | Refills: 11 | Status: SHIPPED | OUTPATIENT
Start: 2023-10-30

## 2023-10-30 RX ORDER — ATENOLOL 25 MG/1
25 TABLET ORAL EVERY 12 HOURS
Qty: 180 TABLET | Refills: 3 | Status: SHIPPED | OUTPATIENT
Start: 2023-10-30

## 2023-11-03 ENCOUNTER — OFFICE VISIT (OUTPATIENT)
Dept: INTERNAL MEDICINE | Facility: CLINIC | Age: 83
End: 2023-11-03
Payer: MEDICARE

## 2023-11-03 VITALS
HEART RATE: 75 BPM | DIASTOLIC BLOOD PRESSURE: 62 MMHG | OXYGEN SATURATION: 100 % | SYSTOLIC BLOOD PRESSURE: 106 MMHG | WEIGHT: 150 LBS | BODY MASS INDEX: 27.44 KG/M2

## 2023-11-03 DIAGNOSIS — I48.0 PAROXYSMAL ATRIAL FIBRILLATION: ICD-10-CM

## 2023-11-03 DIAGNOSIS — Z00.00 MEDICARE ANNUAL WELLNESS VISIT, SUBSEQUENT: ICD-10-CM

## 2023-11-03 DIAGNOSIS — I10 PRIMARY HYPERTENSION: ICD-10-CM

## 2023-11-03 DIAGNOSIS — E78.2 MIXED HYPERLIPIDEMIA: Primary | ICD-10-CM

## 2023-11-03 RX ORDER — VALSARTAN AND HYDROCHLOROTHIAZIDE 160; 25 MG/1; MG/1
TABLET ORAL
COMMUNITY
Start: 2023-10-17

## 2023-11-03 RX ORDER — EZETIMIBE 10 MG/1
TABLET ORAL
COMMUNITY
Start: 2023-10-08

## 2023-11-03 NOTE — PROGRESS NOTES
The ABCs of the Annual Wellness Visit  Subsequent Medicare Wellness Visit    Subjective      Vikki Robledo is a 83 y.o. female who presents for a Subsequent Medicare Wellness Visit.    The following portions of the patient's history were reviewed and   updated as appropriate: allergies, current medications, past family history, past medical history, past social history, past surgical history, and problem list.    Compared to one year ago, the patient feels her physical   health is the same.    Compared to one year ago, the patient feels her mental   health is the same.    Recent Hospitalizations:  This patient has had a [facility] admission record on file within the last 365 days.    Current Medical Providers:  Patient Care Team:  Jewel Arteaga MD as PCP - General (Family Medicine)  Kirsten Reynoso MD as Consulting Physician (Dermatology)    Outpatient Medications Prior to Visit   Medication Sig Dispense Refill    atenolol (TENORMIN) 25 MG tablet TAKE 1 TABLET BY MOUTH EVERY 12 HOURS 180 tablet 3    ciclopirox (LOPROX) 1 % shampoo Shampoo into scalp and let sit on for 5 minutes 3 times per week.  0    Eliquis 5 MG tablet tablet TAKE 1 TABLET BY MOUTH EVERY 12 HOURS FOR ATRIAL FIBRILLATION 60 tablet 11    ezetimibe (ZETIA) 10 MG tablet       Florastor 250 MG capsule Take 1 capsule by mouth Daily. 50 capsule 5    fluocinolone (SYNALAR) 0.01 % external solution APPLY D TO THE SCALP      valsartan-hydrochlorothiazide (DIOVAN-HCT) 160-25 MG per tablet       vitamin D (ERGOCALCIFEROL) 1.25 MG (86585 UT) capsule capsule Take 1 capsule by mouth Every 14 (Fourteen) Days. 8 capsule 3     No facility-administered medications prior to visit.       No opioid medication identified on active medication list. I have reviewed chart for other potential  high risk medication/s and harmful drug interactions in the elderly.        Aspirin is not on active medication list.  Aspirin use is contraindicated for this  "patient due to: current use of Eliquis.  .    Patient Active Problem List   Diagnosis    Degeneration of intervertebral disc of lumbar region    Hyperlipidemia    Hypertension    Stenosis of carotid artery    Carotid stenosis    Muscle spasm    Transient insomnia    Psoriatic arthritis    Psoriasis    Ludy's node    Medicare annual wellness visit, subsequent    Vitamin D deficiency    Hammer toe of left foot    Balance disorder    PVC's (premature ventricular contractions)    Mild cognitive impairment    History of left hip replacement    History of colonic polyps    Fracture of nasal bone    Benign essential HTN    Back pain    APC (atrial premature contractions)    Spastic dysuria    Dystrophic nail    Paroxysmal atrial fibrillation    Toe deformity, acquired, left    Atrial fibrillation, persistent     Advance Care Planning   Advance Care Planning     Advance Directive is not on file.  ACP discussion was held with the patient during this visit. Patient has an advance directive (not in EMR), copy requested.     Objective    Vitals:    11/03/23 1533   BP: 106/62   BP Location: Left arm   Patient Position: Sitting   Cuff Size: Adult   Pulse: 75   SpO2: 100%   Weight: 68 kg (150 lb)     Estimated body mass index is 27.44 kg/m² as calculated from the following:    Height as of 8/11/23: 157.5 cm (62\").    Weight as of this encounter: 68 kg (150 lb).    BMI is >= 25 and <30. (Overweight) The following options were offered after discussion;: exercise counseling/recommendations and nutrition counseling/recommendations      Does the patient have evidence of cognitive impairment?   No            HEALTH RISK ASSESSMENT    Smoking Status:  Social History     Tobacco Use   Smoking Status Former    Types: Cigarettes   Smokeless Tobacco Never     Alcohol Consumption:  Social History     Substance and Sexual Activity   Alcohol Use Yes    Comment: One or more times monthly      Fall Risk Screen:    VIPIN Fall Risk Assessment " was completed, and patient is at MODERATE risk for falls. Assessment completed on:11/3/2023    Depression Screenin/3/2023     3:38 PM   PHQ-2/PHQ-9 Depression Screening   Little Interest or Pleasure in Doing Things 0-->not at all   Feeling Down, Depressed or Hopeless 0-->not at all   PHQ-9: Brief Depression Severity Measure Score 0       Health Habits and Functional and Cognitive Screenin/3/2023     3:35 PM   Functional & Cognitive Status   Do you have difficulty preparing food and eating? No   Do you have difficulty bathing yourself, getting dressed or grooming yourself? No   Do you have difficulty using the toilet? No   Do you have difficulty moving around from place to place? Yes   Do you have trouble with steps or getting out of a bed or a chair? Yes   Dental Exam Up to date   Eye Exam Not up to date   Exercise (times per week) 3 times per week   Current Exercises Include Home Exercise Program (TV, Computer, Etc.)   Do you need help using the phone?  No   Are you deaf or do you have serious difficulty hearing?  Yes   Do you need help to go to places out of walking distance? No   Do you need help shopping? No   Do you need help preparing meals?  No   Do you need help with housework?  No   Do you need help with laundry? No   Do you need help taking your medications? No   Do you need help managing money? No   Do you ever drive or ride in a car without wearing a seat belt? No   Have you felt unusual stress, anger or loneliness in the last month? No   Who do you live with? Alone   If you need help, do you have trouble finding someone available to you? No   Have you been bothered in the last four weeks by sexual problems? No   Do you have difficulty concentrating, remembering or making decisions? No       Age-appropriate Screening Schedule:  Refer to the list below for future screening recommendations based on patient's age, sex and/or medical conditions. Orders for these recommended tests are  listed in the plan section. The patient has been provided with a written plan.    Health Maintenance   Topic Date Due    Pneumococcal Vaccine 65+ (2 - PCV) 02/28/2021    ANNUAL WELLNESS VISIT  04/04/2023    BMI FOLLOWUP  07/15/2023    INFLUENZA VACCINE  08/01/2023    LIPID PANEL  10/11/2023    DXA SCAN  10/17/2024    TDAP/TD VACCINES (2 - Td or Tdap) 04/22/2028    COVID-19 Vaccine  Completed    ZOSTER VACCINE  Completed                  CMS Preventative Services Quick Reference  Risk Factors Identified During Encounter:    Fall Risk-High or Moderate: Discussed Fall Prevention in the home    The above risks/problems have been discussed with the patient.  Pertinent information has been shared with the patient in the After Visit Summary.    There are no diagnoses linked to this encounter.    Follow Up:   Next Medicare Wellness visit to be scheduled in 1 year.      An After Visit Summary and PPPS were made available to the patient.

## 2023-11-03 NOTE — PROGRESS NOTES
"Chief Complaint  Medicare Wellness-subsequent    Subjective        Vikki Robledo presents to [facility] PRIMARY [facility]ARE  History of Present Illness  Karyna is a delightful lady who is here for wellness exam.  She has atrial fibrillation for which Dr. Cano has her on atenolol 25 mg every 12 hours plus Eliquis 5 mg twice daily.  She is tolerating the medicine well but she is concerned about this potential side effects in the future and what the medicine would do to her.  I feel like based on stroke risk is not much alternative at this point and the medicine should be very tolerable.    We will go ahead and give her a flu shot today and she is up-to-date on everything else for the moment and has gotten an RSV immunization in the pharmacy.      Objective   Vital Signs:  /62 (BP Location: Left arm, Patient Position: Sitting, Cuff Size: Adult)   Pulse 75   Wt 68 kg (150 lb)   SpO2 100%   BMI 27.44 kg/m²   Estimated body mass index is 27.44 kg/m² as calculated from the following:    Height as of 8/11/23: 157.5 cm (62\").    Weight as of this encounter: 68 kg (150 lb).             Physical Exam  Vitals reviewed.   Constitutional:       Appearance: She is well-developed.   HENT:      Head: Normocephalic and atraumatic.      Right Ear: Tympanic membrane and external ear normal.      Left Ear: Tympanic membrane and external ear normal.      Nose: Nose normal.   Eyes:      Conjunctiva/sclera: Conjunctivae normal.      Pupils: Pupils are equal, round, and reactive to light.   Neck:      Thyroid: No thyromegaly.      Vascular: No JVD.   Cardiovascular:      Rate and Rhythm: Normal rate. Rhythm irregularly irregular.      Heart sounds: Normal heart sounds.   Pulmonary:      Effort: Pulmonary effort is normal.      Breath sounds: Normal breath sounds.   Abdominal:      General: Bowel sounds are normal.      Palpations: Abdomen is soft.   Musculoskeletal:         General: Normal range of " motion.      Cervical back: Normal range of motion and neck supple.   Lymphadenopathy:      Cervical: No cervical adenopathy.   Skin:     General: Skin is warm and dry.      Findings: No rash.   Neurological:      Mental Status: She is alert and oriented to person, place, and time.      Cranial Nerves: No cranial nerve deficit.      Coordination: Coordination normal.   Psychiatric:         Behavior: Behavior normal.         Thought Content: Thought content normal.         Judgment: Judgment normal.        Result Review :  The following data was reviewed by: Jewel Arteaga MD on 11/03/2023:  Common labs          3/16/2023    02:09 3/17/2023    06:26 8/10/2023    22:24   Common Labs   Glucose   95    BUN   15    Creatinine   0.79    Sodium   145    Potassium   3.8    Chloride   109    Calcium   8.7    Albumin   3.5    Total Bilirubin   0.6    Alkaline Phosphatase   77    AST (SGOT)   26    ALT (SGPT)   12    WBC 12.88     8.16     5.48    Hemoglobin 12.6     11.0     13.9    Hematocrit 38.3     33.7     42.6    Platelets 141     109     138       Details          This result is from an external source.             Data reviewed : Consultant notes cardiology/electrophysiology             Assessment and Plan   Diagnoses and all orders for this visit:    1. Mixed hyperlipidemia (Primary)    2. Primary hypertension    3. Paroxysmal atrial fibrillation  Comments:  Atenolol 25 twice daily plus Eliquis 5 mg twice daily    4. Medicare annual wellness visit, subsequent    Other orders  -     Fluzone High-Dose 65+yrs             Follow Up   No follow-ups on file.  Patient was given instructions and counseling regarding her condition or for health maintenance advice. Please see specific information pulled into the AVS if appropriate.

## 2023-12-08 ENCOUNTER — TRANSCRIBE ORDERS (OUTPATIENT)
Dept: ADMINISTRATIVE | Facility: HOSPITAL | Age: 83
End: 2023-12-08
Payer: MEDICARE

## 2023-12-08 DIAGNOSIS — Z12.31 SCREENING MAMMOGRAM FOR BREAST CANCER: Primary | ICD-10-CM

## 2023-12-18 NOTE — PATIENT INSTRUCTIONS
Medicare Wellness  Personal Prevention Plan of Service     Date of Office Visit:  2021  Encounter Provider:  Jewel Arteaga Jr., MD  Place of Service:  [facility]  Patient Name: Vikki Robledo  :  1940    As part of the Medicare Wellness portion of your visit today, we are providing you with this personalized preventive plan of services (PPPS). This plan is based upon recommendations of the [place] Preventive Services Task Force (USPSTF) and the Advisory Committee on Immunization Practices (ACIP).    This lists the preventive care services that should be considered, and provides dates of when you are due. Items listed as completed are up-to-date and do not require any further intervention.    Health Maintenance   Topic Date Due   • DXA SCAN  Never done   • ZOSTER VACCINE (1 of 2) Never done   • INFLUENZA VACCINE  2020   • ANNUAL WELLNESS VISIT  2020   • LIPID PANEL  2021   • TDAP/TD VACCINES (2 - Td) 2028   • COVID-19 Vaccine  Completed   • Pneumococcal Vaccine 65+  Completed   • MENINGOCOCCAL VACCINE  Aged Out       No orders of the defined types were placed in this encounter.      No follow-ups on file.         61.7

## 2024-01-09 DIAGNOSIS — I48.19 ATRIAL FIBRILLATION, PERSISTENT: ICD-10-CM

## 2024-01-09 NOTE — TELEPHONE ENCOUNTER
Caller: [facility] - [place], [place] - [address] [place] [address] - [phone] [facility] [phone]    Relationship: Pharmacy    Best call back number:[phone] (Work)     Requested Prescriptions:   Eliquis 5 MG tablet tablet        Pharmacy where request should be sent:  [facility] - [place], [place] - [address] [place] [address] - [phone] [facility] [phone] FX     Last office visit with prescribing clinician: 11/3/2023   Last telemedicine visit with prescribing clinician: Visit date not found   Next office visit with prescribing clinician: 11/5/2024     Additional details provided by patient: PATIENT CALLED TO REQUEST A MEDICATION REFILL ON MEDICATION. PATIENT HAS A 14 DAY SUPPLY LEFT.      Does the patient have less than a 3 day supply:  [] Yes  [x] No    Would you like a call back once the refill request has been completed: [] Yes [] No    If the office needs to give you a call back, can they leave a voicemail: [] Yes [] No    Rd Robles Rep   01/09/24 09:15 EST         THANKS

## 2024-01-11 ENCOUNTER — TELEPHONE (OUTPATIENT)
Dept: INTERNAL MEDICINE | Facility: CLINIC | Age: 84
End: 2024-01-11
Payer: MEDICARE

## 2024-01-11 DIAGNOSIS — I48.19 ATRIAL FIBRILLATION, PERSISTENT: ICD-10-CM

## 2024-01-11 NOTE — TELEPHONE ENCOUNTER
Calling to correct Rx for     apixaban (Eliquis) 5 MG tablet tablet [[id number]] (Order [id number])       Only sent 30 day, needs 90 day instead.

## 2024-01-15 RX ORDER — VALSARTAN AND HYDROCHLOROTHIAZIDE 160; 25 MG/1; MG/1
1 TABLET ORAL DAILY
Qty: 90 TABLET | Refills: 5 | Status: SHIPPED | OUTPATIENT
Start: 2024-01-15

## 2024-01-18 ENCOUNTER — TELEPHONE (OUTPATIENT)
Dept: INTERNAL MEDICINE | Facility: CLINIC | Age: 84
End: 2024-01-18
Payer: MEDICARE

## 2024-01-18 NOTE — TELEPHONE ENCOUNTER
Caller: Vikki Dye    Relationship: Self    Best call back number:     What is the best time to reach you:     Who are you requesting to speak with (clinical staff, provider,  specific staff member):     Do you know the name of the person who called:     What was the call regarding: PATIENT IS CALLING IN TO REQUEST A CALL BACK AS SHE IS CALLING IN TO CHECK ON THE STATUS OF HER REFILL FOR ELIQUIS AND IT HAS BEEN DECLINED.  SHE SAYS THAT SHE SPOKE TO SHAHAB URRUTIA LOCATED AT THE [facility] AND TOLD THE PATIENT THAT SHE COULD START GETTING HER MEDICATION THERE FOR CHEAPER.  THE NUMBER TO REACH SHAHAB URRUTIA  [phone]  THE PATIENT WANTS TO BE CALLED TO DISCUSS THIS IN DETAIL.     Is it okay if the provider responds through MyChart:

## 2024-01-18 NOTE — TELEPHONE ENCOUNTER
I spoke with the pt and advised her to reach out to [facility] that RX had been sent 1/10/24. If there was a problem with the RX she will let us know.

## 2024-01-23 ENCOUNTER — HOSPITAL ENCOUNTER (OUTPATIENT)
Dept: MAMMOGRAPHY | Facility: HOSPITAL | Age: 84
Discharge: HOME | End: 2024-01-23
Admitting: FAMILY MEDICINE
Payer: MEDICARE

## 2024-01-23 DIAGNOSIS — Z12.31 SCREENING MAMMOGRAM FOR BREAST CANCER: ICD-10-CM

## 2024-01-23 PROCEDURE — 77063 BREAST TOMOSYNTHESIS BI: CPT

## 2024-01-23 PROCEDURE — 77067 SCR MAMMO BI INCL CAD: CPT

## 2024-02-02 ENCOUNTER — TELEPHONE (OUTPATIENT)
Dept: INTERNAL MEDICINE | Facility: CLINIC | Age: 84
End: 2024-02-02
Payer: MEDICARE

## 2024-02-02 NOTE — TELEPHONE ENCOUNTER
Caller: Vikki Dye    Relationship: Self    Best call back number:[phone]    Caller requesting test results: PATIENT    What test was performed: MAMMOGRAM    When was the test performed: 1-23-24

## 2024-03-09 DIAGNOSIS — I65.23 BILATERAL CAROTID ARTERY STENOSIS: Primary | ICD-10-CM

## 2024-07-12 ENCOUNTER — HOSPITAL ENCOUNTER (OUTPATIENT)
Dept: CARDIOLOGY | Facility: HOSPITAL | Age: 84
Discharge: HOME | End: 2024-07-12
Payer: MEDICARE

## 2024-07-12 ENCOUNTER — OFFICE VISIT (OUTPATIENT)
Dept: INTERNAL MEDICINE | Facility: CLINIC | Age: 84
End: 2024-07-12
Payer: MEDICARE

## 2024-07-12 VITALS
OXYGEN SATURATION: 98 % | SYSTOLIC BLOOD PRESSURE: 142 MMHG | DIASTOLIC BLOOD PRESSURE: 64 MMHG | BODY MASS INDEX: 29.08 KG/M2 | WEIGHT: 159 LBS | HEART RATE: 95 BPM

## 2024-07-12 DIAGNOSIS — R22.43 LOCALIZED SWELLING OF BOTH LOWER LEGS: ICD-10-CM

## 2024-07-12 DIAGNOSIS — I83.893 VARICOSE VEINS OF BILATERAL LOWER EXTREMITIES WITH OTHER COMPLICATIONS: ICD-10-CM

## 2024-07-12 DIAGNOSIS — I10 BENIGN ESSENTIAL HTN: ICD-10-CM

## 2024-07-12 DIAGNOSIS — L03.119 CELLULITIS OF LOWER EXTREMITY, UNSPECIFIED LATERALITY: ICD-10-CM

## 2024-07-12 DIAGNOSIS — E78.2 MIXED HYPERLIPIDEMIA: ICD-10-CM

## 2024-07-12 DIAGNOSIS — I48.19 ATRIAL FIBRILLATION, PERSISTENT: Primary | ICD-10-CM

## 2024-07-12 DIAGNOSIS — J30.89 NON-SEASONAL ALLERGIC RHINITIS, UNSPECIFIED TRIGGER: ICD-10-CM

## 2024-07-12 DIAGNOSIS — I65.23 BILATERAL CAROTID ARTERY STENOSIS: ICD-10-CM

## 2024-07-12 LAB

## 2024-07-12 PROCEDURE — 3078F DIAST BP <80 MM HG: CPT | Performed by: FAMILY MEDICINE

## 2024-07-12 PROCEDURE — 3077F SYST BP >= 140 MM HG: CPT | Performed by: FAMILY MEDICINE

## 2024-07-12 PROCEDURE — 99214 OFFICE O/P EST MOD 30 MIN: CPT | Performed by: FAMILY MEDICINE

## 2024-07-12 PROCEDURE — 93970 EXTREMITY STUDY: CPT

## 2024-07-12 PROCEDURE — G2211 COMPLEX E/M VISIT ADD ON: HCPCS | Performed by: FAMILY MEDICINE

## 2024-07-12 PROCEDURE — 1126F AMNT PAIN NOTED NONE PRSNT: CPT | Performed by: FAMILY MEDICINE

## 2024-07-12 RX ORDER — FEXOFENADINE HCL 60 MG/1
60 TABLET, FILM COATED ORAL DAILY
Qty: 90 TABLET | Refills: 3 | Status: SHIPPED | OUTPATIENT
Start: 2024-07-12

## 2024-07-12 RX ORDER — EZETIMIBE 10 MG/1
10 TABLET ORAL DAILY
Qty: 90 TABLET | Refills: 3 | Status: SHIPPED | OUTPATIENT
Start: 2024-07-12

## 2024-07-12 RX ORDER — MINOCYCLINE HYDROCHLORIDE 50 MG/1
50 TABLET ORAL 2 TIMES DAILY
Qty: 14 TABLET | Refills: 0 | Status: SHIPPED | OUTPATIENT
Start: 2024-07-12 | End: 2024-07-19

## 2024-07-12 NOTE — PROGRESS NOTES
"Chief Complaint  Edema and Numbness    Subjective        Vikki Robledo presents to [facility]  History of Present Illness  Karyna had an episode yesterday when she got up in the morning where she had difficulty moving the legs.  This seems to relate to venous insufficiency bilaterally in the lower legs and she does note some slight erythema/redness in the lower legs as well.  She has been followed by Dr. Hairston for venous insufficiency in the legs as well as carotid artery disease.  Otherwise she is on Eliquis chronically 5 mg twice daily for history of atrial fibrillation.  She is off valsartan hydrochlorothiazide and Zetia but does take atenolol 25 mg every 12 hours for rate control and also blood pressure.    Will restart Zetia 10 mg daily for cholesterol.    Get stat venous duplex both lower extremities for swelling and erythema and treat empiric cellulitis with minocycline 50 twice daily.    For allergic rhinitis slight cough fexofenadine 60 mg every morning.          Objective   Vital Signs:  /64 (BP Location: Left arm, Patient Position: Sitting, Cuff Size: Large Adult)   Pulse 95   Wt 72.1 kg (159 lb)   SpO2 98%   BMI 29.08 kg/m²   Estimated body mass index is 29.08 kg/m² as calculated from the following:    Height as of 8/11/23: 157.5 cm (62\").    Weight as of this encounter: 72.1 kg (159 lb).               Physical Exam  Vitals reviewed.   Constitutional:       Appearance: She is well-developed.   HENT:      Head: Normocephalic and atraumatic.      Right Ear: Tympanic membrane and external ear normal.      Left Ear: Tympanic membrane and external ear normal.      Nose: Nose normal.   Eyes:      Conjunctiva/sclera: Conjunctivae normal.      Pupils: Pupils are equal, round, and reactive to light.   Neck:      Thyroid: No thyromegaly.      Vascular: No JVD.   Cardiovascular:      Rate and Rhythm: Normal rate and regular rhythm.      Heart sounds: Normal " heart sounds.   Pulmonary:      Effort: Pulmonary effort is normal.      Breath sounds: Normal breath sounds.   Abdominal:      General: Bowel sounds are normal.      Palpations: Abdomen is soft.   Musculoskeletal:         General: Normal range of motion.      Cervical back: Normal range of motion and neck supple.      Comments: Evidence of slight erythema with venous stasis dermatitis and hemosiderin pigmentation 1+ nonpitting edema bilaterally.   Lymphadenopathy:      Cervical: No cervical adenopathy.   Skin:     General: Skin is warm and dry.      Findings: No rash.   Neurological:      Mental Status: She is alert and oriented to person, place, and time.      Cranial Nerves: No cranial nerve deficit.      Coordination: Coordination normal.   Psychiatric:         Behavior: Behavior normal.         Thought Content: Thought content normal.         Judgment: Judgment normal.        Result Review :                     Assessment and Plan     Assessment & Plan  Atrial fibrillation, persistent  Rate controlled atenolol 25 mg every 12 hours continue Eliquis 5 mg every 12 hours  Benign essential HTN  Restart zetia 10 mg which she stopped inadvertently.   Varicose veins of bilateral lower extremities with other complications  Will order venous duplex scan.    Mixed hyperlipidemia    start Zetia 10 mg daily  Non-seasonal allergic rhinitis, unspecified trigger  Allegra/fexofenadine 60 mg in the morning for runny nose and slight cough with allergies.  Localized swelling of both lower legs  Will get stat venous duplex at the hospital and otherwise treat cellulitis with minocycline 50 twice daily for a week.  This will be sent through [facility].  Cellulitis of lower extremity, unspecified laterality  Treat with minocycline 50 mg twice daily for 7 days for mild cellulitis with evidence of venous stasis dermatitis.     New Medications Ordered This Visit   Medications    ezetimibe (ZETIA) 10 MG tablet     Sig: Take 1 tablet  by mouth Daily.     Dispense:  90 tablet     Refill:  3    fexofenadine (Allegra Allergy) 60 MG tablet     Sig: Take 1 tablet by mouth Daily. For allergies and nose     Dispense:  90 tablet     Refill:  3        Follow-up Medicare wellness in November follow-up with cardiology electrophysiology Dr. Cano in October.       Follow Up     No follow-ups on file.  Patient was given instructions and counseling regarding her condition or for health maintenance advice. Please see specific information pulled into the AVS if appropriate.

## 2024-07-15 ENCOUNTER — TELEPHONE (OUTPATIENT)
Dept: INTERNAL MEDICINE | Facility: CLINIC | Age: 84
End: 2024-07-15
Payer: MEDICARE

## 2024-07-15 NOTE — TELEPHONE ENCOUNTER
Patient called in and said that she thought that Dr. Arteaga wanted her to do a second test and than she said something about a Dr. Pat Hairston.  Please confirm with Dr. Arteaga.  She is asking about a call back today.

## 2024-07-16 ENCOUNTER — TELEPHONE (OUTPATIENT)
Dept: INTERNAL MEDICINE | Facility: CLINIC | Age: 84
End: 2024-07-16
Payer: MEDICARE

## 2024-07-16 DIAGNOSIS — I83.893 VARICOSE VEINS OF BILATERAL LOWER EXTREMITIES WITH OTHER COMPLICATIONS: Primary | ICD-10-CM

## 2024-07-16 NOTE — TELEPHONE ENCOUNTER
Caller: Jaquan Robledo Vikki Jessica    Relationship: Self    Best call back number: [phone]    What is the best time to reach you: ANYTIME     Who are you requesting to speak with (clinical staff, provider,  specific staff member): CLINICAL     What was the call regarding: PATIENT STATES THAT SHE WAS ONLY ABLE TO COMPLETE ONE OF THE TWO TESTS THAT WERE ORDERED FOR HER FOLLOWING HER MOST RECENT VISIT.     PATIENT IS NOT SURE WHICH TEST SHE IS STILL NEEDING.     PATIENT STATES THAT THE HOSPITAL ADVISED HER TO REACH OUT TO DR. ROSEANN ROBISON FOR HER SECOND TEST, AND WHEN SHE CALLED HER OFFICE THEY INFORMED HER THAT DR ROBISON IS NO LONGER PRACTICING AT THAT FACILITY.     PATIENT WOULD LIKE A CALL BACK TO DISCUSS WHAT TEST STILL NEEDS DONE, AND WHERE SHE CAN GO TO  HAVE THIS COMPLETED.     PLEASE ADVISE

## 2024-07-18 ENCOUNTER — TELEPHONE (OUTPATIENT)
Dept: INTERNAL MEDICINE | Facility: CLINIC | Age: 84
End: 2024-07-18
Payer: MEDICARE

## 2024-07-18 NOTE — TELEPHONE ENCOUNTER
Pt stated that the doctor she was referred too no longer works at the surgical [facility] office and wants to know is it ok to see one of the other doctors there. She would also like for either Dr. Arteaga or griffin to call her to explain her condition. She didn't provide details as to what condition she was talking about.

## 2024-07-24 ENCOUNTER — OFFICE VISIT (OUTPATIENT)
Age: 84
End: 2024-07-24
Payer: MEDICARE

## 2024-07-24 VITALS
HEIGHT: 62 IN | WEIGHT: 159 LBS | SYSTOLIC BLOOD PRESSURE: 128 MMHG | BODY MASS INDEX: 29.26 KG/M2 | DIASTOLIC BLOOD PRESSURE: 76 MMHG

## 2024-07-24 DIAGNOSIS — I65.23 BILATERAL CAROTID ARTERY STENOSIS: Primary | ICD-10-CM

## 2024-07-24 DIAGNOSIS — I83.893 VARICOSE VEINS OF BILATERAL LOWER EXTREMITIES WITH OTHER COMPLICATIONS: ICD-10-CM

## 2024-07-24 DIAGNOSIS — I87.8 VENOUS STASIS: ICD-10-CM

## 2024-07-24 NOTE — PROGRESS NOTES
Patient Name: Vikki Robledo    MRN: [id number] Encounter Date: 07/24/2024      Consulting Service: Vascular Surgery    Referring Provider: Jewel Arteaga MD       CHIEF COMPLAINT:  Chief Complaint   Patient presents with    Follow-up     Venous insufficiency.       Subjective    HPI: Vikki Robledo is a 84 y.o. female is being seen for evaluation/management of  bilateral lower extremity edema that has been ongoing for some time.  She actually had a CT scan done to screen for malignancy back in 2022.  She had a recent venous duplex at the hospital that was negative for DVT and/or reflux.  She follows in our office for carotid disease which she is actually overdue for.  We will reschedule this as she missed her last study.    PAST MEDICAL HISTORY:   Past Medical History:   Diagnosis Date    Arthritis     Carotid artery stenosis 05/13/2021    LEFT    Cataracts, bilateral     Cramp and spasm 07/11/2022    Essential (primary) hypertension     Fibrocystic breast     History of breast surgery     aspriaton of cyst x3    Hyperlipidemia     Hypertension     Memory loss     Pain in leg, unspecified     Spider veins 09/10/2018    Spinal stenosis, lumbosacral region 09/10/2018    Varicose veins of bilateral lower extremities with other complications 09/11/2018      PAST SURGICAL HISTORY:   Past Surgical History:   Procedure Laterality Date    BREAST BIOPSY Bilateral     5 bx total ? how many on each breast last one in 80's  all were benign    BREAST SURGERY      aspriaton of cyst x3    CATARACT EXTRACTION, BILATERAL      CHOLECYSTECTOMY  2007    COLONOSCOPY  2011    CYST REMOVAL      BREAST IN 1963 AND 1970    HERNIA REPAIR      x2    TONSILLECTOMY      UTERINE FIBROID EMBOLIZATION  1970      FAMILY HISTORY:   Family History   Problem Relation Age of Onset    Alzheimer's disease Mother     Pancreatic cancer Father     Heart disease Father     Alzheimer's disease Maternal Aunt     Alzheimer's disease Maternal  "Uncle     Alzheimer's disease Paternal Aunt     Alzheimer's disease Other     Pancreatic cancer Other       SOCIAL HISTORY:   Social History     Tobacco Use    Smoking status: Former     Current packs/day: 0.00     Average packs/day: 0.5 packs/day for 37.0 years (18.5 ttl pk-yrs)     Types: Cigarettes     Start date:      Quit date:      Years since quittin.5    Smokeless tobacco: Never   Vaping Use    Vaping status: Never Used   Substance Use Topics    Alcohol use: Yes     Comment: One or more times monthly     Drug use: Never      MEDICATIONS:   Current Outpatient Medications on File Prior to Visit   Medication Sig Dispense Refill    apixaban (Eliquis) 5 MG tablet tablet Take 1 tablet by mouth Every 12 (Twelve) Hours. 60 tablet 11    atenolol (TENORMIN) 25 MG tablet TAKE 1 TABLET BY MOUTH EVERY 12 HOURS 180 tablet 3    ciclopirox (LOPROX) 1 % shampoo Shampoo into scalp and let sit on for 5 minutes 3 times per week.  0    ezetimibe (ZETIA) 10 MG tablet Take 1 tablet by mouth Daily. 90 tablet 3    fexofenadine (Allegra Allergy) 60 MG tablet Take 1 tablet by mouth Daily. For allergies and nose 90 tablet 3    Florastor 250 MG capsule Take 1 capsule by mouth Daily. 50 capsule 5    fluocinolone (SYNALAR) 0.01 % external solution APPLY D TO THE SCALP       No current facility-administered medications on file prior to visit.       ALLERGIES: Amoxicillin, Penicillins, and Statins       Objective   Vitals:    24 0929   BP: 128/76   Weight: 72.1 kg (159 lb)   Height: 157.5 cm (62.01\")     Body mass index is 29.07 kg/m².          PHYSICAL EXAM:   Physical Exam  Constitutional:       Appearance: Normal appearance.   HENT:      Head: Normocephalic and atraumatic.      Nose: Nose normal.   Eyes:      Extraocular Movements: Extraocular movements intact.      Pupils: Pupils are equal, round, and reactive to light.   Cardiovascular:      Rate and Rhythm: Normal rate.      Pulses: Normal pulses.           Carotid " pulses are 1+ on the right side with bruit and 1+ on the left side with bruit.       Radial pulses are 1+ on the right side and 1+ on the left side.        Femoral pulses are 1+ on the right side and 1+ on the left side.       Popliteal pulses are 1+ on the right side and 1+ on the left side.        Dorsalis pedis pulses are 1+ on the right side and 1+ on the left side.        Posterior tibial pulses are 1+ on the right side and 1+ on the left side.      Heart sounds: Normal heart sounds.      Comments: Mild venous stasis both lower extremities with +1 edema.  Pulmonary:      Effort: Pulmonary effort is normal.      Breath sounds: Normal breath sounds.   Abdominal:      General: Abdomen is flat. Bowel sounds are normal.      Palpations: Abdomen is soft.   Musculoskeletal:         General: Normal range of motion.      Cervical back: Normal range of motion and neck supple.      Right lower le+ Edema present.      Left lower le+ Edema present.   Skin:     General: Skin is warm and dry.   Neurological:      General: No focal deficit present.      Mental Status: She is alert and oriented to person, place, and time. Mental status is at baseline.   Psychiatric:         Mood and Affect: Mood normal.         Thought Content: Thought content normal.          Result Review   LABS:    CBC          8/10/2023    22:24   CBC   WBC 5.48    RBC 4.87    Hemoglobin 13.9    Hematocrit 42.6    MCV 87.5    MCH 28.5    MCHC 32.6    RDW 13.7    Platelets 138      BMP          8/10/2023    22:24   BMP   BUN 15    Creatinine 0.79    Sodium 145    Potassium 3.8    Chloride 109    CO2 27.0    Calcium 8.7         INR          8/10/2023    22:24   Common Labs   INR 1.05            Results Review:       I reviewed the patient's new clinical results.    The following radiologic or non-invasive studies have been reviewed by me: Lower extremity venous study reviewed  Duplex Venous Lower Extremity - Bilateral CAR 2024    Interpretation  Summary    Normal bilateral lower extremity venous duplex scan.     No radiology results for the last 30 days.                ASSESSMENT/PLAN:   Diagnoses and all orders for this visit:    1. Bilateral carotid artery stenosis (Primary)  -     Duplex Carotid Ultrasound CAR; Future    2. Varicose veins of bilateral lower extremities with other complications    3. Venous stasis       84 y.o. female with improving swelling in both lower extremities.  She has some stasis changes but despite this has normal venous scans at the hospital.  She is unfortunately missed her carotid scan appointment organ to set this back up for her as her transition from [facility] to [facility] has caused quite a bit of commotion.  At this point we will get this set up and see her back for it but I am going to set her up with some calf length diabetic socks and see if she can tolerate wearing those.  She will get a pair for free today and when she comes back and we will see how she is doing with them.    I discussed the plan with the patient who is agreeable to the plan of care at this point. Thank you for this consult.   Follow Up  Return in about 1 month (around 8/24/2024).    Tad Song MD   07/24/24

## 2024-07-25 NOTE — TELEPHONE ENCOUNTER
Called patient and left detailed     HUB OKAY TO RELAY:   Dr. Arteaga has placed a referral for Dr. Pat Hairston for you. They should reach out to you to schedule soon if they have not already.

## 2024-08-01 RX ORDER — EZETIMIBE 10 MG/1
10 TABLET ORAL DAILY
Qty: 90 TABLET | Refills: 1 | Status: SHIPPED | OUTPATIENT
Start: 2024-08-01

## 2024-08-01 NOTE — TELEPHONE ENCOUNTER
Rx Refill Note  Requested Prescriptions     Pending Prescriptions Disp Refills    ezetimibe (ZETIA) 10 MG tablet 90 tablet 1     Sig: Take 1 tablet by mouth Daily.      Last office visit with prescribing clinician: 7/12/2024   Last telemedicine visit with prescribing clinician: Visit date not found   Next office visit with prescribing clinician: 11/5/2024

## 2024-08-01 NOTE — TELEPHONE ENCOUNTER
Rx Refill Note  Requested Prescriptions     Pending Prescriptions Disp Refills    ezetimibe (ZETIA) 10 MG tablet 90 tablet 3     Sig: Take 1 tablet by mouth Daily.      Last office visit with prescribing clinician: 7/12/2024   Last telemedicine visit with prescribing clinician: Visit date not found   Next office visit with prescribing clinician: 11/5/2024

## 2024-08-13 ENCOUNTER — TELEPHONE (OUTPATIENT)
Age: 84
End: 2024-08-13

## 2024-08-13 NOTE — TELEPHONE ENCOUNTER
Caller: Vikki Dye    Relationship: Self    Best call back number: [phone]    What is the best time to reach you: ANY      What was the call regarding: PLEASE CALL THE PATIENT FOR DIRECTIONS ON TESTING AND THE FOLLOW UP.     Is it okay if the provider responds through MyChart: NO

## 2024-09-12 ENCOUNTER — HOSPITAL ENCOUNTER (OUTPATIENT)
Facility: HOSPITAL | Age: 84
Discharge: HOME | End: 2024-09-12
Admitting: SURGERY
Payer: MEDICARE

## 2024-09-12 ENCOUNTER — OFFICE VISIT (OUTPATIENT)
Age: 84
End: 2024-09-12
Payer: MEDICARE

## 2024-09-12 VITALS
DIASTOLIC BLOOD PRESSURE: 70 MMHG | WEIGHT: 159 LBS | SYSTOLIC BLOOD PRESSURE: 110 MMHG | HEIGHT: 62 IN | BODY MASS INDEX: 29.26 KG/M2

## 2024-09-12 DIAGNOSIS — I65.23 BILATERAL CAROTID ARTERY STENOSIS: Primary | ICD-10-CM

## 2024-09-12 DIAGNOSIS — I83.893 VARICOSE VEINS OF BILATERAL LOWER EXTREMITIES WITH OTHER COMPLICATIONS: ICD-10-CM

## 2024-09-12 DIAGNOSIS — I87.8 VENOUS STASIS: ICD-10-CM

## 2024-09-12 DIAGNOSIS — I65.23 BILATERAL CAROTID ARTERY STENOSIS: ICD-10-CM

## 2024-09-12 PROCEDURE — 93880 EXTRACRANIAL BILAT STUDY: CPT

## 2024-09-12 NOTE — PROGRESS NOTES
Patient Name: Vikki Robledo    MRN: [id number] Encounter Date: 09/12/2024      Consulting Service: Vascular Surgery    Referring Provider: No ref. provider found       CHIEF COMPLAINT:  Chief Complaint   Patient presents with    Carotid Artery Disease       Subjective    HPI: Vikki Robledo is a 84 y.o. female is being seen for evaluation/management of known carotid disease.  Patient is followed for bilateral carotid stenosis.  The patient's carotid disease appears to be primarily atherosclerotic based in nature.  The patient has not had intervention at this time.  Currently the patient denies symptoms of TIA or stroke.  The patient has been compliant in controlling risk factors including cholesterol control and hypertension control.  Their current medical therapy consists of  Eliquis .  The patient currently is on ongoing statin therapy.  At this point in time patient presents for follow-up of their carotid disease with review of testing including carotid duplex.  Findings indicate disease stability.       And follow up for vein mapping of the bilateral lower extremity.   Patient has been compliant with medical grade compression stocking use as well as elevation.   Despite best medical therapy symptoms persist.  At this point in time I discussed with the patient the options for intervention versus continued medical therapy.  Based on current anatomy and covered endovenous options I have recommended  compression with diabetic socks knee-high. .        PAST MEDICAL HISTORY:   Past Medical History:   Diagnosis Date    Arthritis     Carotid artery stenosis 05/13/2021    LEFT    Cataracts, bilateral     Cramp and spasm 07/11/2022    Essential (primary) hypertension     Fibrocystic breast     History of breast surgery     aspriaton of cyst x3    Hyperlipidemia     Hypertension     Memory loss     Pain in leg, unspecified     Spider veins 09/10/2018    Spinal stenosis, lumbosacral region 09/10/2018     Varicose veins of bilateral lower extremities with other complications 2018      PAST SURGICAL HISTORY:   Past Surgical History:   Procedure Laterality Date    BREAST BIOPSY Bilateral     5 bx total ? how many on each breast last one in 80's  all were benign    BREAST SURGERY      aspriaton of cyst x3    CATARACT EXTRACTION, BILATERAL      CHOLECYSTECTOMY  2007    COLONOSCOPY      CYST REMOVAL      BREAST IN 1963 AND 1970    HERNIA REPAIR      x2    TONSILLECTOMY      UTERINE FIBROID EMBOLIZATION        FAMILY HISTORY:   Family History   Problem Relation Age of Onset    Alzheimer's disease Mother     Pancreatic cancer Father     Heart disease Father     Alzheimer's disease Maternal Aunt     Alzheimer's disease Maternal Uncle     Alzheimer's disease Paternal Aunt     Alzheimer's disease Other     Pancreatic cancer Other       SOCIAL HISTORY:   Social History     Tobacco Use    Smoking status: Former     Current packs/day: 0.00     Average packs/day: 0.5 packs/day for 37.0 years (18.5 ttl pk-yrs)     Types: Cigarettes     Start date:      Quit date:      Years since quittin.7    Smokeless tobacco: Never   Vaping Use    Vaping status: Never Used   Substance Use Topics    Alcohol use: Yes     Comment: One or more times monthly     Drug use: Never      MEDICATIONS:   Current Outpatient Medications on File Prior to Visit   Medication Sig Dispense Refill    apixaban (Eliquis) 5 MG tablet tablet Take 1 tablet by mouth Every 12 (Twelve) Hours. 60 tablet 11    atenolol (TENORMIN) 25 MG tablet TAKE 1 TABLET BY MOUTH EVERY 12 HOURS 180 tablet 3    ciclopirox (LOPROX) 1 % shampoo Shampoo into scalp and let sit on for 5 minutes 3 times per week.  0    ezetimibe (ZETIA) 10 MG tablet Take 1 tablet by mouth Daily. 90 tablet 1    fluocinolone (SYNALAR) 0.01 % external solution APPLY D TO THE SCALP      fexofenadine (Allegra Allergy) 60 MG tablet Take 1 tablet by mouth Daily. For allergies and nose (Patient  "not taking: Reported on 9/12/2024) 90 tablet 3    Florastor 250 MG capsule Take 1 capsule by mouth Daily. (Patient not taking: Reported on 9/12/2024) 50 capsule 5     No current facility-administered medications on file prior to visit.       ALLERGIES: Amoxicillin, Penicillins, and Statins       Objective   Vitals:    09/12/24 1404   BP: 110/70   Weight: 72.1 kg (159 lb)   Height: 157.5 cm (62.01\")     Body mass index is 29.07 kg/m².          PHYSICAL EXAM:   Physical Exam     Result Review   LABS:                   Results Review:       I reviewed the patient's new clinical results.    The following radiologic or non-invasive studies have been reviewed by me: Carotid duplex reviewed with less than 50% stenosis bilaterally.  Duplex Venous Lower Extremity - Bilateral CAR 07/12/2024    Interpretation Summary    Normal bilateral lower extremity venous duplex scan.     No radiology results for the last 30 days.                ASSESSMENT/PLAN:   Diagnoses and all orders for this visit:    1. Bilateral carotid artery stenosis (Primary)  -     Duplex Carotid Ultrasound CAR; Future    2. Varicose veins of bilateral lower extremities with other complications    3. Venous stasis       84 y.o. female with with carotid disease that is stabilized over time and is less than 50%.  Her left side is probably close approaching 50% but is not significant enough to worry about we can certainly check it in a year.  Her leg swelling is under control with diabetic socks although she is wearing cru length she does have a step-off and the swelling in the calf offered to get her a pair of knee-high's and I think that would be better as far as her diabetic socks go.  She will stay in the 9 to 12 mm tor and see if those work for her.  Will get her set today.  Will see her back in a year for her carotid scan.    I discussed the plan with the patient who is agreeable to the plan of care at this point. Thank you for this consult.   Follow " Up  Return in about 1 year (around 9/12/2025).    Tad Song MD   09/12/24

## 2024-09-16 LAB
BH CV XLRA MEAS LEFT CAROTID BULB EDV: 35.7 CM/SEC
BH CV XLRA MEAS LEFT CAROTID BULB PSV: 93.9 CM/SEC
BH CV XLRA MEAS LEFT DIST CCA EDV: 27.3 CM/SEC
BH CV XLRA MEAS LEFT DIST CCA PSV: 84.1 CM/SEC
BH CV XLRA MEAS LEFT DIST ICA EDV: -23.8 CM/SEC
BH CV XLRA MEAS LEFT DIST ICA PSV: -76.4 CM/SEC
BH CV XLRA MEAS LEFT ICA/CCA RATIO: 1.12
BH CV XLRA MEAS LEFT MID CCA EDV: 21.7 CM/SEC
BH CV XLRA MEAS LEFT MID CCA PSV: 80.6 CM/SEC
BH CV XLRA MEAS LEFT MID ICA EDV: -33.6 CM/SEC
BH CV XLRA MEAS LEFT MID ICA PSV: -83.4 CM/SEC
BH CV XLRA MEAS LEFT PROX CCA EDV: -29.4 CM/SEC
BH CV XLRA MEAS LEFT PROX CCA PSV: -112.1 CM/SEC
BH CV XLRA MEAS LEFT PROX ECA EDV: -14.7 CM/SEC
BH CV XLRA MEAS LEFT PROX ECA PSV: -87.6 CM/SEC
BH CV XLRA MEAS LEFT PROX ICA EDV: 35.7 CM/SEC
BH CV XLRA MEAS LEFT PROX ICA PSV: 93.9 CM/SEC
BH CV XLRA MEAS LEFT PROX SCLA PSV: 93.9 CM/SEC
BH CV XLRA MEAS LEFT VERTEBRAL A EDV: 16.8 CM/SEC
BH CV XLRA MEAS LEFT VERTEBRAL A PSV: 54.6 CM/SEC
BH CV XLRA MEAS RIGHT CAROTID BULB EDV: -18 CM/SEC
BH CV XLRA MEAS RIGHT CAROTID BULB PSV: -64 CM/SEC
BH CV XLRA MEAS RIGHT DIST CCA EDV: 18.6 CM/SEC
BH CV XLRA MEAS RIGHT DIST CCA PSV: 78.9 CM/SEC
BH CV XLRA MEAS RIGHT DIST ICA EDV: -36.2 CM/SEC
BH CV XLRA MEAS RIGHT DIST ICA PSV: -154.8 CM/SEC
BH CV XLRA MEAS RIGHT ICA/CCA RATIO: 1.96
BH CV XLRA MEAS RIGHT MID CCA EDV: -24.9 CM/SEC
BH CV XLRA MEAS RIGHT MID CCA PSV: -83.2 CM/SEC
BH CV XLRA MEAS RIGHT MID ICA EDV: -23.6 CM/SEC
BH CV XLRA MEAS RIGHT MID ICA PSV: -72.1 CM/SEC
BH CV XLRA MEAS RIGHT PROX CCA EDV: 19.9 CM/SEC
BH CV XLRA MEAS RIGHT PROX CCA PSV: 82 CM/SEC
BH CV XLRA MEAS RIGHT PROX ECA EDV: -10.6 CM/SEC
BH CV XLRA MEAS RIGHT PROX ECA PSV: -116.2 CM/SEC
BH CV XLRA MEAS RIGHT PROX ICA EDV: -22.4 CM/SEC
BH CV XLRA MEAS RIGHT PROX ICA PSV: -70.8 CM/SEC
BH CV XLRA MEAS RIGHT PROX SCLA PSV: 87 CM/SEC
BH CV XLRA MEAS RIGHT VERTEBRAL A EDV: 13.3 CM/SEC
BH CV XLRA MEAS RIGHT VERTEBRAL A PSV: 43.4 CM/SEC
LEFT ARM BP: NORMAL MMHG
RIGHT ARM BP: NORMAL MMHG

## 2024-10-15 ENCOUNTER — OFFICE VISIT (OUTPATIENT)
Age: 84
End: 2024-10-15
Payer: MEDICARE

## 2024-10-15 VITALS
HEIGHT: 62 IN | WEIGHT: 153 LBS | SYSTOLIC BLOOD PRESSURE: 124 MMHG | BODY MASS INDEX: 28.16 KG/M2 | HEART RATE: 78 BPM | DIASTOLIC BLOOD PRESSURE: 86 MMHG

## 2024-10-15 DIAGNOSIS — I48.19 ATRIAL FIBRILLATION, PERSISTENT: Primary | ICD-10-CM

## 2024-10-15 PROCEDURE — 3074F SYST BP LT 130 MM HG: CPT | Performed by: INTERNAL MEDICINE

## 2024-10-15 PROCEDURE — 93000 ELECTROCARDIOGRAM COMPLETE: CPT | Performed by: INTERNAL MEDICINE

## 2024-10-15 PROCEDURE — 3079F DIAST BP 80-89 MM HG: CPT | Performed by: INTERNAL MEDICINE

## 2024-10-15 PROCEDURE — 99213 OFFICE O/P EST LOW 20 MIN: CPT | Performed by: INTERNAL MEDICINE

## 2024-10-15 NOTE — PROGRESS NOTES
Date of Office Visit: 10/15/2024  Encounter Provider: Marcin Cano MD  Place of Service: [facility] CARDIOLOGY  Patient Name: Vikki Robledo  :1940    Chief Complaint   Patient presents with    persistent AFIB   :     HPI: Vikki Robledo is a 84 y.o. female who presents today for persistent atrial fibrillation on rate control strategy.     She has done well in the interval since last visit, no hospitalizations for heart failure.     We reviewed some general questions about her atrial fibrillation.     She has occasional trouble remembering her mediations, but sounds as though she has worked out a better system.     Her CHADS VASC score is 4.           Past Medical History:   Diagnosis Date    Arthritis     Carotid artery stenosis 2021    LEFT    Cataracts, bilateral     Cramp and spasm 2022    Essential (primary) hypertension     Fibrocystic breast     History of breast surgery     aspriaton of cyst x3    Hyperlipidemia     Hypertension     Memory loss     Pain in leg, unspecified     Spider veins 09/10/2018    Spinal stenosis, lumbosacral region 09/10/2018    Varicose veins of bilateral lower extremities with other complications 2018       Past Surgical History:   Procedure Laterality Date    BREAST BIOPSY Bilateral     5 bx total ? how many on each breast last one in 80's  all were benign    BREAST SURGERY      aspriaton of cyst x3    CATARACT EXTRACTION, BILATERAL      CHOLECYSTECTOMY      COLONOSCOPY      CYST REMOVAL      BREAST IN  AND 1970    HERNIA REPAIR      x2    TONSILLECTOMY      UTERINE FIBROID EMBOLIZATION         Social History     Socioeconomic History    Marital status:    Tobacco Use    Smoking status: Former     Current packs/day: 0.00     Average packs/day: 0.5 packs/day for 37.0 years (18.5 ttl pk-yrs)     Types: Cigarettes     Start date:      Quit date:      Years since quittin.8  "   Smokeless tobacco: Never   Vaping Use    Vaping status: Never Used   Substance and Sexual Activity    Alcohol use: Yes     Comment: One or more times monthly     Drug use: Never    Sexual activity: Defer       Family History   Problem Relation Age of Onset    Alzheimer's disease Mother     Pancreatic cancer Father     Heart disease Father     Alzheimer's disease Maternal Aunt     Alzheimer's disease Maternal Uncle     Alzheimer's disease Paternal Aunt     Alzheimer's disease Other     Pancreatic cancer Other        Review of Systems   Constitutional: Negative.   Cardiovascular: Negative.    Respiratory: Negative.     Gastrointestinal: Negative.        Allergies   Allergen Reactions    Amoxicillin Other (See Comments)     Skin redness    Penicillins Other (See Comments)     Cannot remember reaction    Statins Other (See Comments)     Myalgias         Current Outpatient Medications:     apixaban (Eliquis) 5 MG tablet tablet, Take 1 tablet by mouth Every 12 (Twelve) Hours., Disp: 60 tablet, Rfl: 11    atenolol (TENORMIN) 25 MG tablet, TAKE 1 TABLET BY MOUTH EVERY 12 HOURS, Disp: 180 tablet, Rfl: 3    ciclopirox (LOPROX) 1 % shampoo, Shampoo into scalp and let sit on for 5 minutes 3 times per week., Disp: , Rfl: 0    ezetimibe (ZETIA) 10 MG tablet, Take 1 tablet by mouth Daily., Disp: 90 tablet, Rfl: 1    fexofenadine (Allegra Allergy) 60 MG tablet, Take 1 tablet by mouth Daily. For allergies and nose, Disp: 90 tablet, Rfl: 3    Florastor 250 MG capsule, Take 1 capsule by mouth Daily., Disp: 50 capsule, Rfl: 5    fluocinolone (SYNALAR) 0.01 % external solution, APPLY D TO THE SCALP, Disp: , Rfl:       Objective:     Vitals:    10/15/24 1357   BP: 124/86   BP Location: Right arm   Patient Position: Sitting   Pulse: 78   Weight: 69.4 kg (153 lb)   Height: 157.5 cm (62.01\")     Body mass index is 27.98 kg/m².    PHYSICAL EXAM:    Vitals and nursing note reviewed.   Constitutional:       General: Not in acute " distress.  Pulmonary:      Effort: Pulmonary effort is normal. No respiratory distress.   Cardiovascular:      Normal rate. Regular rhythm.   Edema:     Peripheral edema absent.   Skin:     General: Skin is warm and dry.   Neurological:      Mental Status: Alert and oriented to person, place, and time.   Psychiatric:         Behavior: Behavior normal.         Thought Content: Thought content normal.         Judgment: Judgment normal.             ECG 12 Lead    Date/Time: 10/15/2024 2:39 PM  Performed by: Marcin Cano MD    Authorized by: Marcin Cano MD  Comparison: compared with previous ECG from 10/5/2023  Similar to previous ECG  Rhythm: atrial fibrillation    Clinical impression: normal ECG            Assessment:       Diagnosis Plan   1. Atrial fibrillation, persistent               Plan:       Persistent Atrial Fibrillation.  Rate control is adequate with atenolol.     She is on apixaban.  No issues with bleeding.  Fair compliance     Follow-up in one year    As always, it has been a pleasure to participate in your patient's care.      Sincerely,         Marcin Cano MD

## 2024-10-31 ENCOUNTER — TELEPHONE (OUTPATIENT)
Dept: INTERNAL MEDICINE | Facility: CLINIC | Age: 84
End: 2024-10-31
Payer: MEDICARE

## 2024-10-31 NOTE — TELEPHONE ENCOUNTER
Caller: Vikki Dye    Relationship: Self    Best call back number: [phone]        What was the call regarding: PATIENT WOULD LIKE TO HAVE SCHEDULED APPOINTMENT MOVED UP IF POSSIBLE SHE FEELS LIKE SHE HAS NOT BEEN ABLE TO SEE DR MISTRY DUE CANCELLATION     I RESCHEDULED THE [facility] FOR 2/4/24     SHE WANTED JUST AN OFFICE VISITS AS SOON POSSIBLE  SO I SCHEDULED 1/14/24     PLEASE GIVE CALL BACK

## 2024-10-31 NOTE — TELEPHONE ENCOUNTER
Read/relay     I called the patient and got her voicemail, left a msg  Dr Arteaga does not have any openings any earlier.

## 2024-11-18 ENCOUNTER — TELEPHONE (OUTPATIENT)
Dept: CARDIOLOGY | Facility: CLINIC | Age: 84
End: 2024-11-18
Payer: MEDICARE

## 2024-11-18 NOTE — TELEPHONE ENCOUNTER
Notified pt of recommendations from Gopal. Pt verbalized understanding.    Thank you,    Felicitas Thomas, RN  Triage [facility] [place]  11/18/24 13:34 EST

## 2024-11-18 NOTE — TELEPHONE ENCOUNTER
Dr. Cano/Gopal,    Pt called this afternoon. She said she wore her compression stockings too long one day. Her legs began to ache and she took them off. She noticed a round red jocy on the side of her left leg (under where her stockings were). She has had it a couple days now. It is not painful anymore, but was to begin with. She said it's about 1.5 inches wide. It is still warm to the touch today.    Do you have any recommendations for her? PCP?    Thank you,    Felicitas Thomas RN  [place] [facility] [place]  11/18/24 12:32 EST

## 2024-11-20 NOTE — TELEPHONE ENCOUNTER
Patient is calling back on this issue with her left leg.  She reports it is now more swollen and pink and tender to touch.  She has a hard time describing it further.  She is on eliquis 5 mg BID.  She takes it most of the time she states.    She wants to come in and see Gopal.  I told her it would be better if she called her PCP and made an appointment for someone to look at it.  I let her know she might need to go to the ER to be ruled out for DVT.      Do you have recommendations?    Elizabet Gonsalez RN  [facility] Triage  11/20/24 11:11 EST

## 2024-11-20 NOTE — TELEPHONE ENCOUNTER
Attempted to call patient. No answer and no option to leave a VM (VM full). Will continue to try to reach her.     Thank you,    Felicitas Thomas, RN  Triage [facility] [place]

## 2024-11-21 ENCOUNTER — TELEPHONE (OUTPATIENT)
Dept: INTERNAL MEDICINE | Facility: CLINIC | Age: 84
End: 2024-11-21
Payer: MEDICARE

## 2024-11-21 NOTE — TELEPHONE ENCOUNTER
Call edwin from [facility] - pt states she has leg pain that she believe is from her compression socks being on longer then they should have been that she had swelling and discomfort but its since improved. Pt is able to bear weight, no fever and leg not hot to touch. Pt sched a same day and made aware if leg starts to swell, get hot to touch, leak or she can't bear weight then go to ER , pt voiced understanding

## 2024-11-21 NOTE — TELEPHONE ENCOUNTER
Notified patient of recommendations. Patient verbalized understanding.    Amanda Laboy RN  [place] [facility] [place]

## 2024-11-22 ENCOUNTER — OFFICE VISIT (OUTPATIENT)
Dept: INTERNAL MEDICINE | Facility: CLINIC | Age: 84
End: 2024-11-22
Payer: MEDICARE

## 2024-11-22 VITALS
SYSTOLIC BLOOD PRESSURE: 114 MMHG | DIASTOLIC BLOOD PRESSURE: 70 MMHG | OXYGEN SATURATION: 93 % | HEART RATE: 102 BPM | WEIGHT: 157 LBS | BODY MASS INDEX: 28.71 KG/M2

## 2024-11-22 DIAGNOSIS — I87.8 VENOUS STASIS: ICD-10-CM

## 2024-11-22 DIAGNOSIS — I48.0 PAROXYSMAL ATRIAL FIBRILLATION: ICD-10-CM

## 2024-11-22 DIAGNOSIS — L03.116 CELLULITIS OF LEFT LOWER EXTREMITY: ICD-10-CM

## 2024-11-22 DIAGNOSIS — Z23 NEED FOR INFLUENZA VACCINATION: Primary | ICD-10-CM

## 2024-11-22 RX ORDER — MINOCYCLINE HYDROCHLORIDE 50 MG/1
50 TABLET ORAL 2 TIMES DAILY
Qty: 60 TABLET | Refills: 0 | Status: SHIPPED | OUTPATIENT
Start: 2024-11-22 | End: 2024-12-22

## 2024-11-22 NOTE — PROGRESS NOTES
"Chief Complaint  Cellulitis    Subjective        Vikki Robledo presents to [facility]  History of Present Illness  Delightful;lady whose had some repetitive and venous insufficiency as seen by Dr. Hairston in the past.  She has had a flare of swelling in the left lower extremity even on Eliquis.  She is on Eliquis and atenolol for atrial fibrillation.  Previous episode was in the right leg in the summer and was treated effectively with minocycline.  She has some swelling and erythema at the anterior left lower extremity above the ankle and below the knee.  There is no weeping or drainage.  The skin is shiny in appearance.      Objective   Vital Signs:  /70 (BP Location: Left arm, Patient Position: Sitting, Cuff Size: Adult)   Pulse 102   Wt 71.2 kg (157 lb)   SpO2 93%   BMI 28.71 kg/m²   Estimated body mass index is 28.71 kg/m² as calculated from the following:    Height as of 10/15/24: 157.5 cm (62.01\").    Weight as of this encounter: 71.2 kg (157 lb).             Physical Exam  Vitals reviewed.   Constitutional:       Appearance: She is well-developed.   HENT:      Head: Normocephalic and atraumatic.      Right Ear: Tympanic membrane and external ear normal.      Left Ear: Tympanic membrane and external ear normal.      Nose: Nose normal.   Eyes:      Conjunctiva/sclera: Conjunctivae normal.      Pupils: Pupils are equal, round, and reactive to light.   Neck:      Thyroid: No thyromegaly.      Vascular: No JVD.   Cardiovascular:      Rate and Rhythm: Normal rate and regular rhythm. Rhythm irregularly irregular.      Heart sounds: Normal heart sounds.   Pulmonary:      Effort: Pulmonary effort is normal.      Breath sounds: Normal breath sounds.   Abdominal:      General: Bowel sounds are normal.      Palpations: Abdomen is soft.   Musculoskeletal:         General: Normal range of motion.      Cervical back: Normal range of motion and neck supple.   Lymphadenopathy: "      Cervical: No cervical adenopathy.   Skin:     General: Skin is warm and dry.      Findings: No rash.   Neurological:      Mental Status: She is alert and oriented to person, place, and time.      Cranial Nerves: No cranial nerve deficit.      Coordination: Coordination normal.   Psychiatric:         Behavior: Behavior normal.         Thought Content: Thought content normal.         Judgment: Judgment normal.        Result Review :    The following data was reviewed by: Jewel Arteaga MD on 11/22/2024:                 Assessment and Plan     Assessment & Plan  Need for influenza vaccination    Orders:    Fluzone High-Dose 65+yrs    Venous stasis         Cellulitis of left lower extremity  Minocycline 50 mg bid for 7 days.       Paroxysmal atrial fibrillation  Continue Eliquis plus atenolol                 Follow Up     No follow-ups on file.  Patient was given instructions and counseling regarding her condition or for health maintenance advice. Please see specific information pulled into the AVS if appropriate.

## 2024-11-25 ENCOUNTER — TELEPHONE (OUTPATIENT)
Dept: INTERNAL MEDICINE | Facility: CLINIC | Age: 84
End: 2024-11-25
Payer: MEDICARE

## 2024-11-25 NOTE — TELEPHONE ENCOUNTER
Caller: Vikki Dye    Relationship to patient: Self    Best call back number:     Chief complaint:     Type of visit: OFFICE    Requested date: AS SOON AS POSSIBLE     If rescheduling, when is the original appointment:      Additional notes: PATIENT SAYS THAT SHE WAS IN TO SEE DR MISTRY LAST WEEK AND E WANTS TO SEE HER AGAIN THIS WEEK (AS HE STARTED HER ON A NEW MEDICATION)BUT HIS NEXT APPOINTMENT IS NOT UNTIL FEBRUARY 2025 SHE WANTS TO BE CALLED TO BE WORKED INTO HIS SCHEDULE.

## 2024-12-05 ENCOUNTER — OFFICE VISIT (OUTPATIENT)
Dept: INTERNAL MEDICINE | Facility: CLINIC | Age: 84
End: 2024-12-05
Payer: MEDICARE

## 2024-12-05 VITALS
HEART RATE: 76 BPM | OXYGEN SATURATION: 98 % | BODY MASS INDEX: 28.52 KG/M2 | SYSTOLIC BLOOD PRESSURE: 136 MMHG | WEIGHT: 156 LBS | DIASTOLIC BLOOD PRESSURE: 64 MMHG

## 2024-12-05 DIAGNOSIS — T22.112A: ICD-10-CM

## 2024-12-05 DIAGNOSIS — I87.2 STASIS DERMATITIS OF BOTH LEGS: ICD-10-CM

## 2024-12-05 DIAGNOSIS — I78.8 CAPILLARITIS: ICD-10-CM

## 2024-12-05 DIAGNOSIS — L03.116 CELLULITIS OF LEFT LOWER EXTREMITY: Primary | ICD-10-CM

## 2024-12-05 PROCEDURE — 1160F RVW MEDS BY RX/DR IN RCRD: CPT | Performed by: FAMILY MEDICINE

## 2024-12-05 PROCEDURE — 3075F SYST BP GE 130 - 139MM HG: CPT | Performed by: FAMILY MEDICINE

## 2024-12-05 PROCEDURE — 1159F MED LIST DOCD IN RCRD: CPT | Performed by: FAMILY MEDICINE

## 2024-12-05 PROCEDURE — 3078F DIAST BP <80 MM HG: CPT | Performed by: FAMILY MEDICINE

## 2024-12-05 PROCEDURE — 99214 OFFICE O/P EST MOD 30 MIN: CPT | Performed by: FAMILY MEDICINE

## 2024-12-05 PROCEDURE — 1126F AMNT PAIN NOTED NONE PRSNT: CPT | Performed by: FAMILY MEDICINE

## 2024-12-05 RX ORDER — MINOCYCLINE HYDROCHLORIDE 100 MG/1
100 TABLET ORAL DAILY
Qty: 30 TABLET | Refills: 2 | Status: SHIPPED | OUTPATIENT
Start: 2024-12-05 | End: 2025-02-03

## 2024-12-05 RX ORDER — INDAPAMIDE 2.5 MG/1
2.5 TABLET ORAL EVERY MORNING
Qty: 90 TABLET | Refills: 1 | Status: SHIPPED | OUTPATIENT
Start: 2024-12-05
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
## 2024-12-05 NOTE — ASSESSMENT & PLAN NOTE
She has some component of capillaritis will treat as above  Orders:    Ambulatory Referral to Vascular Surgery

## 2024-12-05 NOTE — ASSESSMENT & PLAN NOTE
Recurrent cellulitis appears to be more of a stasis dermatitis and will try addressing this with the socks she has on the day which are supportive as well as daily minocycline 100 mg plus indapamide 2.5 mg in the morning to help prevent lower extremity edema.  Orders:    Ambulatory Referral to Vascular Surgery  Evaluation of stasis dermatitis by vascular surgery pending
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
## 2025-01-14 ENCOUNTER — OFFICE VISIT (OUTPATIENT)
Dept: INTERNAL MEDICINE | Facility: CLINIC | Age: 85
End: 2025-01-14
Payer: MEDICARE

## 2025-01-14 VITALS
SYSTOLIC BLOOD PRESSURE: 114 MMHG | BODY MASS INDEX: 28.16 KG/M2 | HEART RATE: 50 BPM | DIASTOLIC BLOOD PRESSURE: 78 MMHG | OXYGEN SATURATION: 99 % | WEIGHT: 154 LBS

## 2025-01-14 DIAGNOSIS — I10 BENIGN ESSENTIAL HTN: ICD-10-CM

## 2025-01-14 DIAGNOSIS — E78.2 MIXED HYPERLIPIDEMIA: ICD-10-CM

## 2025-01-14 DIAGNOSIS — I48.19 ATRIAL FIBRILLATION, PERSISTENT: Primary | ICD-10-CM

## 2025-01-14 DIAGNOSIS — I87.8 VENOUS STASIS: ICD-10-CM

## 2025-01-14 DIAGNOSIS — Z78.9 STATIN INTOLERANCE: ICD-10-CM

## 2025-01-14 PROCEDURE — 1126F AMNT PAIN NOTED NONE PRSNT: CPT | Performed by: FAMILY MEDICINE

## 2025-01-14 PROCEDURE — 1159F MED LIST DOCD IN RCRD: CPT | Performed by: FAMILY MEDICINE

## 2025-01-14 PROCEDURE — 3074F SYST BP LT 130 MM HG: CPT | Performed by: FAMILY MEDICINE

## 2025-01-14 PROCEDURE — 1160F RVW MEDS BY RX/DR IN RCRD: CPT | Performed by: FAMILY MEDICINE

## 2025-01-14 PROCEDURE — G2211 COMPLEX E/M VISIT ADD ON: HCPCS | Performed by: FAMILY MEDICINE

## 2025-01-14 PROCEDURE — 99214 OFFICE O/P EST MOD 30 MIN: CPT | Performed by: FAMILY MEDICINE

## 2025-01-14 PROCEDURE — 3078F DIAST BP <80 MM HG: CPT | Performed by: FAMILY MEDICINE

## 2025-01-14 NOTE — ASSESSMENT & PLAN NOTE
Small dose of indapamide to reduce swelling without causing dehydration.  Follow-up with vascular surgery.  Minocycline 100 mg daily prevent cellulitis.

## 2025-01-14 NOTE — PROGRESS NOTES
"Chief Complaint  Cellulitis    Subjective        Vikki Robledo presents to [facility]  History of Present Illness  The patient returns for recheck of question of cellulitis.  We placed her on minocycline 100 mg daily to reduce the incidence of erythema on the lower extremities.  Now under her socks she has evidence of trace edema without weeping of both sides and a purpleish discoloration of the lower extremities particular on the left side.  This appears to be from chronic venous stasis.  She is not really wearing support hose.    Other than this finding we discussed her memory and she appears to have some aspect of mild cognitive impairment but she is able to make it to her appointment but was upset because she apparently lost the last AVS document she had.  She has an appointment with vascular surgery in February to discuss her lower extremities although it looks more like she is having mostly venous stasis that might include treatment with support hose is the only treatment.    She does not wish to submit any further memory testing and continues treatment for atrial fibrillation with Eliquis 5 mg twice daily as well as atenolol 25 mg daily and Zetia 10 mg daily for cholesterol with statin intolerance.  Minocycline is continued at 100 mg daily prevent cellulitis in the lower extremities which she has had.  I would like to consider reducing Eliquis down to 2.5 twice daily on next visit.      Objective   Vital Signs:  /78 (BP Location: Left arm, Patient Position: Sitting, Cuff Size: Adult)   Pulse 50   Wt 69.9 kg (154 lb)   SpO2 99%   BMI 28.16 kg/m²   Estimated body mass index is 28.16 kg/m² as calculated from the following:    Height as of 10/15/24: 157.5 cm (62.01\").    Weight as of this encounter: 69.9 kg (154 lb).       BMI is >= 25 and <30. (Overweight) The following options were offered after discussion;: none (medical contraindication)      Physical " Exam  Vitals reviewed.   Constitutional:       Appearance: She is well-developed.   HENT:      Head: Normocephalic and atraumatic.      Right Ear: Tympanic membrane and external ear normal.      Left Ear: Tympanic membrane and external ear normal.      Nose: Nose normal.   Eyes:      Conjunctiva/sclera: Conjunctivae normal.      Pupils: Pupils are equal, round, and reactive to light.   Neck:      Thyroid: No thyromegaly.      Vascular: No JVD.   Cardiovascular:      Rate and Rhythm: Normal rate. Rhythm irregularly irregular.      Heart sounds: Normal heart sounds.   Pulmonary:      Effort: Pulmonary effort is normal.      Breath sounds: Normal breath sounds.   Abdominal:      General: Bowel sounds are normal.      Palpations: Abdomen is soft.   Musculoskeletal:         General: Normal range of motion.      Cervical back: Normal range of motion and neck supple.      Right lower le+      Left lower le+      Comments: 1+ pedal edema with purplish discoloration from 3 or 4 cm below the knee on the left side and a similar finding as the right side which look to be venous stasis.  There is no obvious warmth or erythema.   Lymphadenopathy:      Cervical: No cervical adenopathy.   Skin:     General: Skin is warm and dry.      Findings: No rash.   Neurological:      Mental Status: She is alert and oriented to person, place, and time.      Cranial Nerves: No cranial nerve deficit.      Coordination: Coordination normal.   Psychiatric:         Behavior: Behavior normal.         Thought Content: Thought content normal.         Judgment: Judgment normal.      Result Review :    The following data was reviewed by: Jewel Arteaga MD on 2025:                 Assessment and Plan     Assessment & Plan  Atrial fibrillation, persistent  Consider reducing Eliquis to 2.5 twice daily on next visit in 3 months.       Benign essential HTN  latenolol 25 mg daily          Mixed hyperlipidemia    Zetia 10 mg daily         Statin  intolerance         Venous stasis  Small dose of indapamide to reduce swelling without causing dehydration.  Follow-up with vascular surgery.  Minocycline 100 mg daily prevent cellulitis.                 Follow Up     No follow-ups on file.  Patient was given instructions and counseling regarding her condition or for health maintenance advice. Please see specific information pulled into the AVS if appropriate.

## 2025-01-24 DIAGNOSIS — I10 BENIGN ESSENTIAL HTN: ICD-10-CM

## 2025-01-24 RX ORDER — ATENOLOL 25 MG/1
25 TABLET ORAL EVERY 12 HOURS
Qty: 180 TABLET | Refills: 3 | Status: SHIPPED | OUTPATIENT
Start: 2025-01-24

## 2025-01-28 ENCOUNTER — TRANSCRIBE ORDERS (OUTPATIENT)
Dept: ADMINISTRATIVE | Facility: HOSPITAL | Age: 85
End: 2025-01-28
Payer: MEDICARE

## 2025-01-28 DIAGNOSIS — Z12.31 VISIT FOR SCREENING MAMMOGRAM: Primary | ICD-10-CM

## 2025-02-04 ENCOUNTER — OFFICE VISIT (OUTPATIENT)
Dept: INTERNAL MEDICINE | Facility: CLINIC | Age: 85
End: 2025-02-04
Payer: MEDICARE

## 2025-02-04 VITALS
BODY MASS INDEX: 27.98 KG/M2 | OXYGEN SATURATION: 98 % | WEIGHT: 153 LBS | HEART RATE: 94 BPM | SYSTOLIC BLOOD PRESSURE: 138 MMHG | DIASTOLIC BLOOD PRESSURE: 80 MMHG

## 2025-02-04 DIAGNOSIS — G31.84 MILD COGNITIVE IMPAIRMENT: ICD-10-CM

## 2025-02-04 DIAGNOSIS — E78.2 MIXED HYPERLIPIDEMIA: ICD-10-CM

## 2025-02-04 DIAGNOSIS — I87.2 VENOUS INSUFFICIENCY: ICD-10-CM

## 2025-02-04 DIAGNOSIS — I10 BENIGN ESSENTIAL HTN: ICD-10-CM

## 2025-02-04 DIAGNOSIS — I48.19 ATRIAL FIBRILLATION, PERSISTENT: Primary | ICD-10-CM

## 2025-02-04 DIAGNOSIS — Z00.00 MEDICARE ANNUAL WELLNESS VISIT, SUBSEQUENT: ICD-10-CM

## 2025-02-04 PROCEDURE — 1170F FXNL STATUS ASSESSED: CPT | Performed by: FAMILY MEDICINE

## 2025-02-04 PROCEDURE — 3075F SYST BP GE 130 - 139MM HG: CPT | Performed by: FAMILY MEDICINE

## 2025-02-04 PROCEDURE — 1160F RVW MEDS BY RX/DR IN RCRD: CPT | Performed by: FAMILY MEDICINE

## 2025-02-04 PROCEDURE — 1159F MED LIST DOCD IN RCRD: CPT | Performed by: FAMILY MEDICINE

## 2025-02-04 PROCEDURE — G0439 PPPS, SUBSEQ VISIT: HCPCS | Performed by: FAMILY MEDICINE

## 2025-02-04 PROCEDURE — 99214 OFFICE O/P EST MOD 30 MIN: CPT | Performed by: FAMILY MEDICINE

## 2025-02-04 PROCEDURE — 3079F DIAST BP 80-89 MM HG: CPT | Performed by: FAMILY MEDICINE

## 2025-02-04 PROCEDURE — 1126F AMNT PAIN NOTED NONE PRSNT: CPT | Performed by: FAMILY MEDICINE

## 2025-02-04 RX ORDER — ATENOLOL 25 MG/1
25 TABLET ORAL EVERY 12 HOURS
Qty: 180 TABLET | Refills: 3 | Status: SHIPPED | OUTPATIENT
Start: 2025-02-04

## 2025-02-04 RX ORDER — EZETIMIBE 10 MG/1
10 TABLET ORAL DAILY
Qty: 90 TABLET | Refills: 1 | Status: SHIPPED | OUTPATIENT
Start: 2025-02-04

## 2025-02-04 RX ORDER — INDAPAMIDE 2.5 MG/1
2.5 TABLET ORAL EVERY MORNING
Qty: 90 TABLET | Refills: 1 | Status: SHIPPED | OUTPATIENT
Start: 2025-02-04

## 2025-02-04 RX ORDER — FEXOFENADINE HCL 60 MG/1
60 TABLET, FILM COATED ORAL DAILY
Qty: 90 TABLET | Refills: 3 | Status: SHIPPED | OUTPATIENT
Start: 2025-02-04
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
## 2025-02-04 NOTE — ASSESSMENT & PLAN NOTE
Atenolol plus indapamide continued  Orders:    atenolol (TENORMIN) 25 MG tablet; Take 1 tablet by mouth Every 12 (Twelve) Hours.

## 2025-02-04 NOTE — PATIENT INSTRUCTIONS
Medicare Wellness  Personal Prevention Plan of Service     Date of Office Visit:    Encounter Provider:  Jewel Arteaga MD  Place of Service:  [facility]  Patient Name: Vikki Robledo  :  1940    As part of the Medicare Wellness portion of your visit today, we are providing you with this personalized preventive plan of services (PPPS). This plan is based upon recommendations of the [place] Preventive Services Task Force (USPSTF) and the Advisory Committee on Immunization Practices (ACIP).    This lists the preventive care services that should be considered, and provides dates of when you are due. Items listed as completed are up-to-date and do not require any further intervention.    Health Maintenance   Topic Date Due    Pneumococcal Vaccine 65+ (2 of 2 - PCV) 2021    LIPID PANEL  10/11/2023    DXA SCAN  10/17/2024    ANNUAL WELLNESS VISIT  2024    BMI FOLLOWUP  2024    TDAP/TD VACCINES (2 - Td or Tdap) 2028    COVID-19 Vaccine  Completed    RSV Vaccine - Adults  Completed    INFLUENZA VACCINE  Completed    ZOSTER VACCINE  Completed       No orders of the defined types were placed in this encounter.      No follow-ups on file.

## 2025-02-04 NOTE — ASSESSMENT & PLAN NOTE
Atenolol for rate control and Eliquis 5 mg every 12 hours  Orders:    apixaban (Eliquis) 5 MG tablet tablet; Take 1 tablet by mouth Every 12 (Twelve) Hours.

## 2025-02-10 ENCOUNTER — TELEPHONE (OUTPATIENT)
Dept: INTERNAL MEDICINE | Facility: CLINIC | Age: 85
End: 2025-02-10
Payer: MEDICARE

## 2025-02-10 NOTE — TELEPHONE ENCOUNTER
I spoke with the pt she said she feels very lethargic and her mouth is very dry.  I explained to her she needs to hydrate with water and maybe some gatoraide, she replied  with she doesn't have anyone to run errands for her. She said she's never felt this way and isn't sure what to do. I explained to her only she knows how she feels and if she feels like this is emergent she needs to go to be evaluated.

## 2025-02-10 NOTE — TELEPHONE ENCOUNTER
Caller: Vikki Dye    Relationship: Self    Best call back number: [phone]     What is the best time to reach you: ANY    Who are you requesting to speak with (clinical staff, provider,  specific staff member): PCP        What was the call regarding: PATIENT STATES THEY ARE HAVING A VERY DRY MOUTH AND LETHARGIC. SHE IS WANTING TO SPEAK WITH SOMEONE ABOUT THAT AND ANOTHER ISSUE THAT SHE WILL NOT GIVE INFORMATION ABOUT    Is it okay if the provider responds through MyChart: PHONE CALL

## 2025-02-13 ENCOUNTER — OFFICE VISIT (OUTPATIENT)
Age: 85
End: 2025-02-13
Payer: MEDICARE

## 2025-02-13 VITALS
SYSTOLIC BLOOD PRESSURE: 148 MMHG | HEART RATE: 114 BPM | DIASTOLIC BLOOD PRESSURE: 77 MMHG | BODY MASS INDEX: 28.16 KG/M2 | HEIGHT: 62 IN | WEIGHT: 153 LBS

## 2025-02-13 DIAGNOSIS — I73.9 PVD (PERIPHERAL VASCULAR DISEASE): ICD-10-CM

## 2025-02-13 DIAGNOSIS — I87.2 VENOUS (PERIPHERAL) INSUFFICIENCY: ICD-10-CM

## 2025-02-13 DIAGNOSIS — I83.893 VARICOSE VEINS OF BILATERAL LOWER EXTREMITIES WITH OTHER COMPLICATIONS: ICD-10-CM

## 2025-02-13 DIAGNOSIS — I65.23 BILATERAL CAROTID ARTERY STENOSIS: Primary | ICD-10-CM

## 2025-02-13 DIAGNOSIS — I87.323 CHRONIC VENOUS HYPERTENSION WITH INFLAMMATION INVOLVING BOTH SIDES: ICD-10-CM

## 2025-02-13 DIAGNOSIS — I87.8 VENOUS STASIS: ICD-10-CM

## 2025-02-13 NOTE — PROGRESS NOTES
Patient Name: Vikki Robledo    MRN: [id number] Encounter Date: 02/13/2025      Consulting Service: Vascular Surgery    Referring Provider: Jewel Arteaga MD       CHIEF COMPLAINT:  Chief Complaint   Patient presents with    Lower Extremity Issue       Subjective    HPI: Vikki Robledo is a 84 y.o. female is being seen for evaluation/management of newly discovered/diagnosed carotid disease.  The patient has bilateral carotid stenosis based on carotid duplex.  The patient denies recent or past symptoms of TIA and stroke. They are currently on the following antiplatelet agent Aspirin and statin Lipitor.  During today's evaluation we discussed the pathophysiology of carotid disease in general and their's in particular.  We discussed the fact that carotid disease accounts for roughly 1/3 of the strokes that occur in this country.  They are aware that symptoms of stroke should be taken seriously whether or not their carotid disease is directly involved.  Furthermore we discussed the risk factors for progression of their disease including but not limited to smoking, hypertension and hypercholesterolemia.  At this point in time initial recommendations for long-term follow-up are being discussed. and complaints of venous insufficiency and lower extremity edema of bilateral lower extremity.   Symptoms include Song symptoms: Edema/Swelling, Rash/Irritation, Pigmentation, Pain/Aches, Heaviness/Fullness, and Throbbing.  Patient describes the discomfort from the veins as affecting their daily life.   Patient has negative family history of the varicose veins and negative family history of DVT.  At this point time attempts at symptomatic control using elevation, compression and nonsteroidals have not been been attempted.  Prior prior venous interventions  include  none .    PAST MEDICAL HISTORY:   Past Medical History:   Diagnosis Date    Arthritis     Carotid artery stenosis 05/13/2021    LEFT    Cataracts,  bilateral     Cramp and spasm 2022    Essential (primary) hypertension     Fibrocystic breast     History of breast surgery     aspriaton of cyst x3    Hyperlipidemia     Hypertension     Memory loss     Pain in leg, unspecified     Spider veins 09/10/2018    Spinal stenosis, lumbosacral region 09/10/2018    Varicose veins of bilateral lower extremities with other complications 2018      PAST SURGICAL HISTORY:   Past Surgical History:   Procedure Laterality Date    BREAST BIOPSY Bilateral     5 bx total ? how many on each breast last one in 80's  all were benign    BREAST SURGERY      aspriaton of cyst x3    CATARACT EXTRACTION, BILATERAL      CHOLECYSTECTOMY      COLONOSCOPY      CYST REMOVAL      BREAST IN  AND 1970    HERNIA REPAIR      x2    TONSILLECTOMY      UTERINE FIBROID EMBOLIZATION        FAMILY HISTORY:   Family History   Problem Relation Age of Onset    Alzheimer's disease Mother     Pancreatic cancer Father     Heart disease Father     Alzheimer's disease Maternal Aunt     Alzheimer's disease Maternal Uncle     Alzheimer's disease Paternal Aunt     Alzheimer's disease Other     Pancreatic cancer Other       SOCIAL HISTORY:   Social History     Tobacco Use    Smoking status: Former     Current packs/day: 0.00     Average packs/day: 0.5 packs/day for 37.0 years (18.5 ttl pk-yrs)     Types: Cigarettes     Start date:      Quit date:      Years since quittin.1    Smokeless tobacco: Never   Vaping Use    Vaping status: Never Used   Substance Use Topics    Alcohol use: Yes     Comment: One or more times monthly     Drug use: Never      MEDICATIONS:   Current Outpatient Medications on File Prior to Visit   Medication Sig Dispense Refill    apixaban (Eliquis) 5 MG tablet tablet Take 1 tablet by mouth Every 12 (Twelve) Hours. 60 tablet 11    atenolol (TENORMIN) 25 MG tablet Take 1 tablet by mouth Every 12 (Twelve) Hours. 180 tablet 3    ciclopirox (LOPROX) 1 % shampoo  "Shampoo into scalp and let sit on for 5 minutes 3 times per week.  0    ezetimibe (ZETIA) 10 MG tablet Take 1 tablet by mouth Daily. 90 tablet 1    fexofenadine (Allegra Allergy) 60 MG tablet Take 1 tablet by mouth Daily. For allergies and nose 90 tablet 3    fluocinolone (SYNALAR) 0.01 % external solution APPLY D TO THE SCALP      indapamide (LOZOL) 2.5 MG tablet Take 1 tablet by mouth Every Morning. TO PREVENT SWELLING 90 tablet 1     No current facility-administered medications on file prior to visit.       ALLERGIES: Amoxicillin, Penicillins, and Statins       Objective   Vitals:    02/13/25 1536   BP: 148/77   Pulse: 114   Weight: 69.4 kg (153 lb)   Height: 157.5 cm (62.01\")     Body mass index is 27.98 kg/m².          PHYSICAL EXAM:   Physical Exam  Constitutional:       Appearance: Normal appearance.   HENT:      Head: Normocephalic and atraumatic.      Nose: Nose normal.   Eyes:      Extraocular Movements: Extraocular movements intact.      Pupils: Pupils are equal, round, and reactive to light.   Cardiovascular:      Rate and Rhythm: Normal rate.      Pulses:           Carotid pulses are 2+ on the right side and 2+ on the left side.       Radial pulses are 2+ on the right side and 2+ on the left side.        Femoral pulses are 2+ on the right side and 2+ on the left side.       Popliteal pulses are 1+ on the right side and 1+ on the left side.        Dorsalis pedis pulses are 1+ on the right side and 1+ on the left side.        Posterior tibial pulses are 1+ on the right side and 1+ on the left side.      Heart sounds: Normal heart sounds.      Comments: Bilateral severe venous stasis injury to the shins and to the feet.  Pulmonary:      Effort: Pulmonary effort is normal.      Breath sounds: Normal breath sounds.   Abdominal:      General: Abdomen is flat. Bowel sounds are normal.      Palpations: Abdomen is soft.   Musculoskeletal:         General: Normal range of motion.      Cervical back: Normal range " of motion and neck supple.      Right lower le+ Edema present.      Left lower le+ Edema present.   Skin:     General: Skin is warm and dry.   Neurological:      General: No focal deficit present.      Mental Status: She is alert and oriented to person, place, and time. Mental status is at baseline.   Psychiatric:         Mood and Affect: Mood normal.         Thought Content: Thought content normal.          Result Review   LABS:                   Results Review:       I reviewed the patient's new clinical results.    The following radiologic or non-invasive studies have been reviewed by me: Scan less than 50% bilaterally.  Venous scan from last July negative for DVT or reflux  Duplex Carotid Bilateral CAR 2024    Interpretation Summary    Right internal carotid artery demonstrates a less than 50% stenosis.    Antegrade right vertebral flow.    Left internal carotid artery demonstrates a less than 50% stenosis.    Antegrade left vertebral flow.     No radiology results for the last 30 days.                ASSESSMENT/PLAN:   Diagnoses and all orders for this visit:    1. Bilateral carotid artery stenosis (Primary)    2. Varicose veins of bilateral lower extremities with other complications  -     Doppler Ankle Brachial Index Single Level CAR; Future  -     Venous w Reflux Lower Extremity - Unilateral CAR; Future    3. Venous stasis  -     Doppler Ankle Brachial Index Single Level CAR; Future  -     Venous w Reflux Lower Extremity - Unilateral CAR; Future    4. Venous (peripheral) insufficiency  -     Doppler Ankle Brachial Index Single Level CAR; Future  -     Venous w Reflux Lower Extremity - Unilateral CAR; Future    5. Chronic venous hypertension with inflammation involving both sides  -     Doppler Ankle Brachial Index Single Level CAR; Future  -     Venous w Reflux Lower Extremity - Unilateral CAR; Future    6. PVD (peripheral vascular disease)  -     Doppler Ankle Brachial Index Single Level CAR;  Future  -     Venous w Reflux Lower Extremity - Unilateral CAR; Future       84 y.o. female with appears to be severe stasis dermatitis and possibly some additional peripheral vascular these were have a hard time feeling her pulses.  She has been wearing compression at night not during the day and she is going to reverse that and start wearing them during the day and not at night.  At this point she has more swelling on the left and more stasis and organ to get a vein mapping on that side to determine if there is some reflux that was not really visible on the scan done last July.  Additionally him to get arterial testing as I have a hard time palpating her pulses.  Assuming things are stable from the artery and she is tolerating the knee-high sock use then I think we should have a good result from wearing the hose keeping her out of problems.  At this point the skin injury is likely permanent but we can hopefully keep her from getting worse over time.  See her back for her left class I mapping and her MAURICE she will wear her knee-high since he quit diabetic socks until then.    I discussed the plan with the patient who is agreeable to the plan of care at this point. Thank you for this consult.   Follow Up  Return in about 6 weeks (around 3/27/2025).    Tad Song MD   02/13/25

## 2025-02-28 DIAGNOSIS — I65.23 BILATERAL CAROTID ARTERY STENOSIS: Primary | ICD-10-CM

## 2025-03-13 RX ORDER — MINOCYCLINE HYDROCHLORIDE 100 MG/1
100 TABLET ORAL DAILY
Qty: 30 TABLET | Refills: 2 | Status: SHIPPED | OUTPATIENT
Start: 2025-03-13

## 2025-03-13 NOTE — TELEPHONE ENCOUNTER
Rx Refill Note  Requested Prescriptions     Pending Prescriptions Disp Refills    minocycline (DYNACIN) 100 MG tablet [Pharmacy Med Name: MINOCYCLINE 100MG TABLETS] 30 tablet 2     Sig: TAKE 1 TABLET BY MOUTH DAILY      Last office visit with prescribing clinician: 2/4/2025   Last telemedicine visit with prescribing clinician: Visit date not found   Next office visit with prescribing clinician: 4/15/2025                         Would you like a call back once the refill request has been completed: [] Yes [] No    If the office needs to give you a call back, can they leave a voicemail: [] Yes [] No    August Rivas MA  03/13/25, 10:35 EDT

## 2025-03-18 ENCOUNTER — HOSPITAL ENCOUNTER (OUTPATIENT)
Dept: MAMMOGRAPHY | Facility: HOSPITAL | Age: 85
Discharge: HOME | End: 2025-03-18
Admitting: FAMILY MEDICINE
Payer: MEDICARE

## 2025-03-18 DIAGNOSIS — Z12.31 VISIT FOR SCREENING MAMMOGRAM: ICD-10-CM

## 2025-03-18 PROCEDURE — 77067 SCR MAMMO BI INCL CAD: CPT

## 2025-03-18 PROCEDURE — 77063 BREAST TOMOSYNTHESIS BI: CPT

## 2025-03-19 NOTE — TELEPHONE ENCOUNTER
Hub okay to read.  [facility] is no longer doing any radiology in the office, we can send her order to anywhere of her choice.  [facility] or [facility].   
PATIENT CALLED STATING THAT SHE WAS SUPPOSED TO HAVE A DEXA SCAN PRIOR TO HER APPOINTMENT ON 04/04/22.    THE PATIENT RECEIVED A NOTIFICATION SAYING THAT HER DEXA SCAN HAS BEEN CANCELED, AND PROVIDED NO REASON FOR THE CANCELLATION.    THE PATIENT WOULD LIKE TO KNOW WHAT SHE IS SUPPOSED TO DO, BECAUSE THE IN OFFICE APPOINTMENT WITH DR. MISTRY WAS SUPPOSED TO DISCUSS THE RESULTS OF THE SCAN, AND SHE FEELS IT IS VERY VITAL THAT SHE HAVE THE SCAN DONE.    PLEASE ADVISE   [phone]   
Traumatic closed fracture of one rib with minimal displacement, left, initial encounter

## 2025-04-08 ENCOUNTER — TELEPHONE (OUTPATIENT)
Dept: INTERNAL MEDICINE | Facility: CLINIC | Age: 85
End: 2025-04-08
Payer: MEDICARE

## 2025-04-08 NOTE — TELEPHONE ENCOUNTER
Caller: Vikki Dye    Relationship: Self    Best call back number:  CELL OR  [phone] HOME    What medication are you requesting: fluocinolone (SYNALAR) 0.01 % external solution     What are your current symptoms:     How long have you been experiencing symptoms:     Have you had these symptoms before:    [] Yes  [] No    Have you been treated for these symptoms before:   [] Yes  [] No    If a prescription is needed, what is your preferred pharmacy and phone number:   PATIENT WILL CALL BACK WITH THE NAME OF THE PHARMACY SHE WANTS TO USE IN [place]    Additional notes: PATIENT IS CALLING IN ASKING FOR A REFILL ON THE MEDICATION LISTED ABOVE BUT THIS WAS NOT PRESCRIBED BY DR MISTRY.

## 2025-04-08 NOTE — TELEPHONE ENCOUNTER
Caller: Vikki Dye    Relationship: Self    Best call back number: [phone]     What medication are you requesting:     fluocinolone (SYNALAR) 0.01 % external solution       What are your current symptoms: SWELLING OF LEGS    How long have you been experiencing symptoms: ONGOING    Have you had these symptoms before:    [x] Yes  [] No    Have you been treated for these symptoms before:   [x] Yes  [] No    If a prescription is needed, what is your preferred pharmacy and phone number: [email or web address] [address] [phone] [facility] [phone]      Additional notes: PATIENT IS WANTING A NEW PRESCRIPTION OF THIS MEDICATION SENT TO MAIL IN PHARMACY. IT WOULD NEED TO BE SENT IN DR COLEMAN MISTRY NAME.

## 2025-04-10 ENCOUNTER — HOSPITAL ENCOUNTER (OUTPATIENT)
Facility: HOSPITAL | Age: 85
Discharge: HOME | End: 2025-04-10
Payer: MEDICARE

## 2025-04-10 ENCOUNTER — OFFICE VISIT (OUTPATIENT)
Age: 85
End: 2025-04-10
Payer: MEDICARE

## 2025-04-10 VITALS
SYSTOLIC BLOOD PRESSURE: 146 MMHG | HEIGHT: 62 IN | BODY MASS INDEX: 28.16 KG/M2 | WEIGHT: 153 LBS | DIASTOLIC BLOOD PRESSURE: 94 MMHG

## 2025-04-10 DIAGNOSIS — I87.323 CHRONIC VENOUS HYPERTENSION WITH INFLAMMATION INVOLVING BOTH SIDES: ICD-10-CM

## 2025-04-10 DIAGNOSIS — I65.23 BILATERAL CAROTID ARTERY STENOSIS: ICD-10-CM

## 2025-04-10 DIAGNOSIS — I87.8 VENOUS STASIS: ICD-10-CM

## 2025-04-10 DIAGNOSIS — I87.2 VENOUS (PERIPHERAL) INSUFFICIENCY: ICD-10-CM

## 2025-04-10 DIAGNOSIS — I83.893 VARICOSE VEINS OF BILATERAL LOWER EXTREMITIES WITH OTHER COMPLICATIONS: ICD-10-CM

## 2025-04-10 DIAGNOSIS — I73.9 PVD (PERIPHERAL VASCULAR DISEASE): ICD-10-CM

## 2025-04-10 DIAGNOSIS — I78.8 CAPILLARITIS: ICD-10-CM

## 2025-04-10 DIAGNOSIS — I65.29 STENOSIS OF CAROTID ARTERY, UNSPECIFIED LATERALITY: ICD-10-CM

## 2025-04-10 DIAGNOSIS — I83.893 VARICOSE VEINS OF BILATERAL LOWER EXTREMITIES WITH OTHER COMPLICATIONS: Primary | ICD-10-CM

## 2025-04-10 LAB
BH CV LEFT LOWER VAS GSV KNEE TRANSVERSE DIAMETER: 0.3 CM
BH CV LEFT LOWER VAS GSV MID CALF TRANS DIAMETER: 0.3 CM
BH CV LEFT LOWER VAS GSV PROX TRANSVERSE DIAMETER: 0.6 CM
BH CV LEFT LOWER VAS GSVBELOW KNEE TRANSVERSE DIAMETER: 0.2 CM
BH CV LEFT LOWER VAS PERFORATOR 1 REFLUX TIME: 2.49 SEC
BH CV LEFT LOWER VAS PERFORATOR 1 TRANS DIAMETER: 0.7 CM
BH CV LEFT LOWER VAS SAPHENOFEMORAL JUNCTION TRANSVERSE DIAMETER: 0.7 CM
BH CV LEFT LOWER VAS SSV PROX CALF REFLUX TIME: 2.55 SEC
BH CV LEFT LOWER VAS SSV PROX CALF TRANS DIAMETER: 0.3 CM
BH CV LOW VAS LEFT EXTERNAL ILIAC AUGMENT: NORMAL
BH CV LOW VAS LEFT EXTERNAL ILIAC COMPRESS: NORMAL
BH CV LOW VAS LEFT LESSER SAPH VESSEL: 1
BH CV LOWER ARTERIAL LEFT ABI RATIO: 0.94
BH CV LOWER ARTERIAL LEFT DORSALIS PEDIS SYS MAX: 122
BH CV LOWER ARTERIAL LEFT POST TIBIAL SYS MAX: 124
BH CV LOWER ARTERIAL RIGHT ABI RATIO: 1.1
BH CV LOWER ARTERIAL RIGHT DORSALIS PEDIS SYS MAX: 130
BH CV LOWER ARTERIAL RIGHT POST TIBIAL SYS MAX: 146
BH CV LOWER VAS LEFT GSV DIST THIGH COMPRESSIBILTY: NORMAL
BH CV LOWER VAS LEFT GSV MID CALF COMPRESSIBILTY: NORMAL
BH CV LOWER VASCULAR LEFT COMMON FEMORAL AUGMENT: NORMAL
BH CV LOWER VASCULAR LEFT COMMON FEMORAL COMPETENT: NORMAL
BH CV LOWER VASCULAR LEFT COMMON FEMORAL COMPRESS: NORMAL
BH CV LOWER VASCULAR LEFT COMMON FEMORAL PHASIC: NORMAL
BH CV LOWER VASCULAR LEFT COMMON FEMORAL SPONT: NORMAL
BH CV LOWER VASCULAR LEFT DISTAL FEMORAL COMPRESS: NORMAL
BH CV LOWER VASCULAR LEFT EXTERNAL ILIAC COMPETENT: NORMAL
BH CV LOWER VASCULAR LEFT EXTERNAL ILIAC PHASIC: NORMAL
BH CV LOWER VASCULAR LEFT EXTERNAL ILIAC SPONT: NORMAL
BH CV LOWER VASCULAR LEFT GASTRONEMIUS COMPRESS: NORMAL
BH CV LOWER VASCULAR LEFT GREATER SAPH AK COMPETENT: NORMAL
BH CV LOWER VASCULAR LEFT GREATER SAPH AK COMPRESS: NORMAL
BH CV LOWER VASCULAR LEFT GREATER SAPH BK COMPETENT: NORMAL
BH CV LOWER VASCULAR LEFT GREATER SAPH BK COMPRESS: NORMAL
BH CV LOWER VASCULAR LEFT GSV DIST THIGH COMPETENT: NORMAL
BH CV LOWER VASCULAR LEFT GSV MID CALF COMPETENT: NORMAL
BH CV LOWER VASCULAR LEFT LESSER SAPH COMPETENT: NORMAL
BH CV LOWER VASCULAR LEFT LESSER SAPH COMPRESS: NORMAL
BH CV LOWER VASCULAR LEFT MID FEMORAL AUGMENT: NORMAL
BH CV LOWER VASCULAR LEFT MID FEMORAL COMPETENT: NORMAL
BH CV LOWER VASCULAR LEFT MID FEMORAL COMPRESS: NORMAL
BH CV LOWER VASCULAR LEFT MID FEMORAL PHASIC: NORMAL
BH CV LOWER VASCULAR LEFT MID FEMORAL SPONT: NORMAL
BH CV LOWER VASCULAR LEFT PERFORATOR 1 COMPETENT: NORMAL
BH CV LOWER VASCULAR LEFT PERFORATOR 1 COMPRESS: NORMAL
BH CV LOWER VASCULAR LEFT PERFORATOR 1 VESSEL: 1
BH CV LOWER VASCULAR LEFT PERONEAL COMPRESS: NORMAL
BH CV LOWER VASCULAR LEFT POPLITEAL AUGMENT: NORMAL
BH CV LOWER VASCULAR LEFT POPLITEAL COMPETENT: NORMAL
BH CV LOWER VASCULAR LEFT POPLITEAL COMPRESS: NORMAL
BH CV LOWER VASCULAR LEFT POPLITEAL PHASIC: NORMAL
BH CV LOWER VASCULAR LEFT POPLITEAL SPONT: NORMAL
BH CV LOWER VASCULAR LEFT POSTERIOR TIBIAL COMPRESS: NORMAL
BH CV LOWER VASCULAR LEFT PROFUNDA FEMORAL COMPRESS: NORMAL
BH CV LOWER VASCULAR LEFT PROXIMAL FEMORAL COMPRESS: NORMAL
BH CV LOWER VASCULAR LEFT SAPHENOFEMORAL JUNCTION AUGMENT: NORMAL
BH CV LOWER VASCULAR LEFT SAPHENOFEMORAL JUNCTION COMPETENT: NORMAL
BH CV LOWER VASCULAR LEFT SAPHENOFEMORAL JUNCTION COMPRESS: NORMAL
BH CV LOWER VASCULAR LEFT SAPHENOFEMORAL JUNCTION PHASIC: NORMAL
BH CV LOWER VASCULAR LEFT SAPHENOFEMORAL JUNCTION SPONT: NORMAL
BH CV LOWER VASCULAR LEFT SAPHENOPOP JX PHASIC: NORMAL
BH CV LOWER VASCULAR LEFT SSV MID CALF COMPETENT: NORMAL
BH CV LOWER VASCULAR LEFT SSV MID CALF COMPRESS: NORMAL
BH CV LOWER VASCULAR RIGHT EXTERNAL ILIAC AUGMENT: NORMAL
BH CV LOWER VASCULAR RIGHT EXTERNAL ILIAC COMPETENT: NORMAL
BH CV LOWER VASCULAR RIGHT EXTERNAL ILIAC COMPRESS: NORMAL
BH CV LOWER VASCULAR RIGHT EXTERNAL ILIAC PHASIC: NORMAL
BH CV LOWER VASCULAR RIGHT EXTERNAL ILIAC SPONT: NORMAL
UPPER ARTERIAL LEFT ARM BRACHIAL SYS MAX: 132
UPPER ARTERIAL RIGHT ARM BRACHIAL SYS MAX: 120

## 2025-04-10 PROCEDURE — 93922 UPR/L XTREMITY ART 2 LEVELS: CPT

## 2025-04-10 PROCEDURE — 93971 EXTREMITY STUDY: CPT

## 2025-04-10 NOTE — PROGRESS NOTES
Patient Name: Vikki Robledo    MRN: [id number] Encounter Date: 04/10/2025      Consulting Service: Vascular Surgery    Referring Provider: No ref. provider found       CHIEF COMPLAINT:  Chief Complaint   Patient presents with    Varicose Veins       Subjective    HPI: Vikki Robledo is a 84 y.o. female is being seen for evaluation/management of known carotid disease.  Patient is followed for bilateral carotid stenosis.  The patient's carotid disease appears to be primarily atherosclerotic based in nature.  The patient has not had intervention at this time.  Currently the patient denies symptoms of TIA or stroke.  The patient has been compliant in controlling risk factors including cholesterol control.  Their current medical therapy consists of aspirin.  The patient currently is on ongoing statin therapy.  At this point in time patient presents for follow-up of their carotid disease with review of testing including carotid duplex.  Findings indicate disease stability.                                                                                                                                                                                                                                                       Patient is also being seen for evaluation/management of follow up for vein mapping of the left lower extremity.   Patient has been compliant with medical grade compression stocking use as well as elevation.   Despite best medical therapy symptoms persist.  At this point in time I discussed with the patient the options for intervention versus continued medical therapy.  Based on current anatomy and covered endovenous options I have recommended EVLA.    While the patient was then I discussed in detail at length the procedure itself as well as the risk benefits and complications thereof.  Risks include but are not limited to bleeding, infection, nerve injury, skin injury, failure to clear the  veins, failure to clear the pain, deep vein thrombosis and associated pulmonary emboli. For endovenous laser heat related injury was discussed.  Patient understands and will schedule at their convenience.                                                                                                                                                                                                                                                       Patient is also being seen for evaluation/management of peripheral vascular disease follow-up.  The patient has known peripheral vascular disease with symptoms of  leg discoloration .  Current symptoms of claudication at 1 block distance are noted without lifestyle limitation.  Prior interventions include none.  Currently the patient reports symptoms are stable.  Testing today includes ABIs.  Their current medical regimen includes antiplatelet medications.  The patient appears to have normal sensation.  Discussion as to the protection of feet and toes with prevention of injury including twice a day monitoring of feet and all digits, avoidance of walking barefoot, and the use of lightly colored socks to note any drainage were reviewed with the patient.  Plan moving forward will include continue to follow-up with noninvasive testing.    PAST MEDICAL HISTORY:   Past Medical History:   Diagnosis Date    Arthritis     Carotid artery stenosis 05/13/2021    LEFT    Cataracts, bilateral     Cramp and spasm 07/11/2022    Essential (primary) hypertension     Fibrocystic breast     History of breast surgery     aspriaton of cyst x3    Hyperlipidemia     Hypertension     Memory loss     Pain in leg, unspecified     Spider veins 09/10/2018    Spinal stenosis, lumbosacral region 09/10/2018    Varicose veins of bilateral lower extremities with other complications 09/11/2018      PAST SURGICAL HISTORY:   Past Surgical History:   Procedure Laterality Date    BREAST BIOPSY Bilateral     5 bx  total ? how many on each breast last one in 80's  all were benign    BREAST SURGERY      aspriaton of cyst x3    CATARACT EXTRACTION, BILATERAL      CHOLECYSTECTOMY  2007    COLONOSCOPY  2011    CYST REMOVAL      BREAST IN 1963 AND 1970    HERNIA REPAIR      x2    TONSILLECTOMY      UTERINE FIBROID EMBOLIZATION        FAMILY HISTORY:   Family History   Problem Relation Age of Onset    Alzheimer's disease Mother     Pancreatic cancer Father     Heart disease Father     Alzheimer's disease Maternal Aunt     Alzheimer's disease Maternal Uncle     Alzheimer's disease Paternal Aunt     Alzheimer's disease Other     Pancreatic cancer Other       SOCIAL HISTORY:   Social History     Tobacco Use    Smoking status: Former     Current packs/day: 0.00     Average packs/day: 0.5 packs/day for 37.0 years (18.5 ttl pk-yrs)     Types: Cigarettes     Start date:      Quit date:      Years since quittin.2     Passive exposure: Past    Smokeless tobacco: Never   Vaping Use    Vaping status: Never Used   Substance Use Topics    Alcohol use: Yes     Comment: One or more times monthly     Drug use: Never      MEDICATIONS:   Current Outpatient Medications on File Prior to Visit   Medication Sig Dispense Refill    apixaban (Eliquis) 5 MG tablet tablet Take 1 tablet by mouth Every 12 (Twelve) Hours. 60 tablet 11    atenolol (TENORMIN) 25 MG tablet Take 1 tablet by mouth Every 12 (Twelve) Hours. 180 tablet 3    ciclopirox (LOPROX) 1 % shampoo Shampoo into scalp and let sit on for 5 minutes 3 times per week.  0    ezetimibe (ZETIA) 10 MG tablet Take 1 tablet by mouth Daily. 90 tablet 1    fexofenadine (Allegra Allergy) 60 MG tablet Take 1 tablet by mouth Daily. For allergies and nose 90 tablet 3    fluocinolone (SYNALAR) 0.01 % external solution APPLY D TO THE SCALP      indapamide (LOZOL) 2.5 MG tablet Take 1 tablet by mouth Every Morning. TO PREVENT SWELLING 90 tablet 1    minocycline (DYNACIN) 100 MG tablet TAKE 1 TABLET  "BY MOUTH DAILY 30 tablet 2     No current facility-administered medications on file prior to visit.       ALLERGIES: Amoxicillin, Penicillins, and Statins       Objective   Vitals:    04/10/25 1035   BP: 146/94   Weight: 69.4 kg (153 lb)   Height: 157.5 cm (62.01\")     Body mass index is 27.98 kg/m².          PHYSICAL EXAM:   Physical Exam  Constitutional:       Appearance: Normal appearance.   HENT:      Head: Normocephalic and atraumatic.      Nose: Nose normal.   Eyes:      Extraocular Movements: Extraocular movements intact.      Pupils: Pupils are equal, round, and reactive to light.   Cardiovascular:      Rate and Rhythm: Normal rate.      Pulses: Normal pulses.      Heart sounds: Normal heart sounds.      Comments: Bilateral severe stasis left greater than right, no skin injury but definitely [place] tissue that improves with elevation  Pulmonary:      Effort: Pulmonary effort is normal.      Breath sounds: Normal breath sounds.   Abdominal:      General: Abdomen is flat. Bowel sounds are normal.      Palpations: Abdomen is soft.   Musculoskeletal:         General: Normal range of motion.      Cervical back: Normal range of motion and neck supple.      Right lower le+ Edema present.      Left lower le+ Edema present.   Skin:     General: Skin is warm and dry.   Neurological:      General: No focal deficit present.      Mental Status: She is alert and oriented to person, place, and time. Mental status is at baseline.   Psychiatric:         Mood and Affect: Mood normal.         Thought Content: Thought content normal.          Result Review   LABS:                   Results Review:       I reviewed the patient's new clinical results.    The following radiologic or non-invasive studies have been reviewed by me: We hide is within normal limits.  Left leg mapping shows successfully ablated medial branch of the saphenous in the thigh a high-grade reflux of the lesser saphenous vein but normal deep " veins.  Duplex Carotid Bilateral CAR 09/12/2024    Interpretation Summary    Right internal carotid artery demonstrates a less than 50% stenosis.    Antegrade right vertebral flow.    Left internal carotid artery demonstrates a less than 50% stenosis.    Antegrade left vertebral flow.     No radiology results for the last 30 days.                ASSESSMENT/PLAN:   Diagnoses and all orders for this visit:    1. Varicose veins of bilateral lower extremities with other complications (Primary)    2. Venous (peripheral) insufficiency    3. Venous stasis    4. Bilateral carotid artery stenosis  -     Duplex Carotid Ultrasound CAR; Future    5. Chronic venous hypertension with inflammation involving both sides    6. Capillaritis    7. Stenosis of carotid artery, unspecified laterality       84 y.o. female with venous stasis to the legs which is possibly very recently worsened.  She states it does get a lot better after sleeping at night as soon as she gets her legs down and comes roaring back.  She is wearing diabetic socks and these seem to be controlling her swelling.  She has 1 area of varicosity in the posterior calf that could be treated if it became symptomatic but at this point in time there is no intervention indicated for the insecure for the stasis changes.  Her deep veins are actually very good.  Her successful ablation by Dr. Hairston in the past seems to be holding.  At this point we would plan a 1 year follow-up on her carotid disease which is less than 50% on both sides and we can look at her legs at that time as well.  Her arterial testing is within normal limits and I would not repeat that at this time    I discussed the plan with the patient and family who are agreeable to the plan of care at this point. Thank you for this consult.   Follow Up  Return in about 3 months (around 7/10/2025).    Tad Song MD   04/10/25

## 2025-04-15 ENCOUNTER — OFFICE VISIT (OUTPATIENT)
Dept: INTERNAL MEDICINE | Facility: CLINIC | Age: 85
End: 2025-04-15
Payer: MEDICARE

## 2025-04-15 VITALS
SYSTOLIC BLOOD PRESSURE: 124 MMHG | HEART RATE: 73 BPM | WEIGHT: 146 LBS | DIASTOLIC BLOOD PRESSURE: 62 MMHG | BODY MASS INDEX: 26.7 KG/M2 | OXYGEN SATURATION: 96 %

## 2025-04-15 DIAGNOSIS — L98.9 SKIN LESION OF FACE: ICD-10-CM

## 2025-04-15 DIAGNOSIS — I65.23 BILATERAL CAROTID ARTERY STENOSIS: ICD-10-CM

## 2025-04-15 DIAGNOSIS — I87.323 CHRONIC VENOUS HYPERTENSION WITH INFLAMMATION INVOLVING BOTH SIDES: ICD-10-CM

## 2025-04-15 DIAGNOSIS — R73.09 ELEVATED GLUCOSE: ICD-10-CM

## 2025-04-15 DIAGNOSIS — E78.2 MIXED HYPERLIPIDEMIA: ICD-10-CM

## 2025-04-15 DIAGNOSIS — M51.362 DEGENERATION OF INTERVERTEBRAL DISC OF LUMBAR REGION WITH DISCOGENIC BACK PAIN AND LOWER EXTREMITY PAIN: ICD-10-CM

## 2025-04-15 DIAGNOSIS — E53.8 B12 DEFICIENCY: ICD-10-CM

## 2025-04-15 DIAGNOSIS — I48.19 ATRIAL FIBRILLATION, PERSISTENT: Primary | ICD-10-CM

## 2025-04-15 DIAGNOSIS — G31.84 MILD COGNITIVE IMPAIRMENT: ICD-10-CM

## 2025-04-15 DIAGNOSIS — I10 PRIMARY HYPERTENSION: ICD-10-CM

## 2025-04-15 DIAGNOSIS — M15.0 PRIMARY OSTEOARTHRITIS INVOLVING MULTIPLE JOINTS: ICD-10-CM

## 2025-04-15 DIAGNOSIS — E67.3 HYPERVITAMINOSIS D: ICD-10-CM

## 2025-04-15 DIAGNOSIS — E55.9 VITAMIN D DEFICIENCY: ICD-10-CM

## 2025-04-15 PROBLEM — I48.0 PAROXYSMAL ATRIAL FIBRILLATION: Status: RESOLVED | Noted: 2023-08-10 | Resolved: 2025-04-15

## 2025-04-15 NOTE — PROGRESS NOTES
Chief Complaint  Hypertension, Hyperlipidemia, and Atrial Fibrillation    Subjective        Vikki Robledo presents to [facility]  History of Present Illness  History of Present Illness  The patient presents for evaluation of atrial fibrillation, hypertension, hypercholesterolemia, and a cystic lesion.    She reports experiencing a bluish discoloration in both lower extremities, which typically resolves overnight but reappears upon ambulation. This symptom has been present for approximately 2 months. Lotion has been applied sparingly to the affected area. Consultation with Dr. Carrillo led to recommendations for daytime compression therapy and arterial testing.    Blood work to assess cholesterol levels has not been performed in the past 1.5 years, and she expresses interest in undergoing this test. She has a history of attending a cholesterol clinic for several years and is curious about the potential link between her leg symptoms and cholesterol levels. Currently, she is on ezetimibe for cholesterol management.    Her blood pressure was recorded as 128 today, prompting questions about the necessity of her antihypertensive medication. She is on indapamide and atenolol for blood pressure control.    A diagnosis of atrial fibrillation was made during a preoperative evaluation for hip surgery. She seeks information about its etiology and has a history of irregular heartbeats. Concerns about the impact of her weight on her condition are noted. She has a history of working night shifts and does not consume alcohol. Resuming exercise is being considered, as physical activity has not been engaged in for the past 2 to 3 months. Questions about the advisability of consuming tea and coffee given her health status are raised. She is currently on Eliquis 5 mg twice daily.    A low vitamin D level has been reported, and she is interested in exploring treatment options.    A small cystic  "lesion located laterally to the mouth has recently developed pus, and she seeks advice on whether this can be excised.    PAST SURGICAL HISTORY:  - Hip surgery    SOCIAL HISTORY  She does not drink alcohol regularly but may have a glass of wine every two or three weeks.    Objective   Vital Signs:  /62 (BP Location: Left arm, Patient Position: Sitting, Cuff Size: Adult)   Pulse 73   Wt 66.2 kg (146 lb)   SpO2 96%   BMI 26.70 kg/m²   Estimated body mass index is 26.7 kg/m² as calculated from the following:    Height as of 4/10/25: 157.5 cm (62.01\").    Weight as of this encounter: 66.2 kg (146 lb).            Physical Exam  Vitals reviewed.   Constitutional:       Appearance: She is well-developed.   HENT:      Head: Normocephalic and atraumatic.      Right Ear: Tympanic membrane and external ear normal.      Left Ear: Tympanic membrane and external ear normal.      Nose: Nose normal.   Eyes:      Conjunctiva/sclera: Conjunctivae normal.      Pupils: Pupils are equal, round, and reactive to light.   Neck:      Thyroid: No thyromegaly.      Vascular: No JVD.   Cardiovascular:      Rate and Rhythm: Normal rate. Rhythm irregular.      Heart sounds: Normal heart sounds.   Pulmonary:      Effort: Pulmonary effort is normal.      Breath sounds: Normal breath sounds.   Abdominal:      General: Bowel sounds are normal.      Palpations: Abdomen is soft.   Musculoskeletal:         General: Normal range of motion.      Cervical back: Normal range of motion and neck supple.      Right lower leg: Edema present.      Left lower leg: Edema present.      Comments: Blue discoloration bilaterally from venous stasis   Lymphadenopathy:      Cervical: No cervical adenopathy.   Skin:     General: Skin is warm and dry.      Findings: Bruising present. No rash.   Neurological:      Mental Status: She is alert and oriented to person, place, and time.      Cranial Nerves: No cranial nerve deficit.      Coordination: Coordination " normal.   Psychiatric:         Behavior: Behavior normal.         Thought Content: Thought content normal.         Judgment: Judgment normal.        Physical Exam  Mouth/Throat: Tiny cystic lesion lateral to the mouth  Respiratory: Clear to auscultation, no wheezing, rales or rhonchi  Cardiovascular: Regular rate and rhythm, no murmurs, rubs, or gallops  Extremities: Bluish discoloration in both lower legs when up and around, which disappears sometimes in the morning    Result Review :  The following data was reviewed by: Jewel Arteaga MD on 04/15/2025:    Data reviewed : Consultant notes vascular surgery    Results               Assessment and Plan   Diagnoses and all orders for this visit:    1. Atrial fibrillation, persistent (Primary)    2. Bilateral carotid artery stenosis  -     Urinalysis With Microscopic If Indicated (No Culture) - Urine, Clean Catch  -     TSH  -     T4, Free  -     T3, Free  -     Lipid Panel With / Chol / HDL Ratio  -     CBC & Differential  -     Comprehensive Metabolic Panel  -     Hemoglobin A1c  -     Sedimentation Rate  -     Vitamin B12  -     Vitamin D,25-Hydroxy  -     Folate    3. Chronic venous hypertension with inflammation involving both sides    4. Mixed hyperlipidemia  -     Urinalysis With Microscopic If Indicated (No Culture) - Urine, Clean Catch  -     TSH  -     T4, Free  -     T3, Free  -     Lipid Panel With / Chol / HDL Ratio  -     CBC & Differential  -     Comprehensive Metabolic Panel  -     Hemoglobin A1c  -     Sedimentation Rate  -     Vitamin B12  -     Vitamin D,25-Hydroxy  -     Folate    5. Primary hypertension  -     Urinalysis With Microscopic If Indicated (No Culture) - Urine, Clean Catch  -     TSH  -     T4, Free  -     T3, Free  -     Lipid Panel With / Chol / HDL Ratio  -     CBC & Differential  -     Comprehensive Metabolic Panel  -     Hemoglobin A1c  -     Sedimentation Rate  -     Vitamin B12  -     Vitamin D,25-Hydroxy  -     Folate    6. Vitamin  D deficiency  -     Urinalysis With Microscopic If Indicated (No Culture) - Urine, Clean Catch  -     TSH  -     T4, Free  -     T3, Free  -     Lipid Panel With / Chol / HDL Ratio  -     CBC & Differential  -     Comprehensive Metabolic Panel  -     Hemoglobin A1c  -     Sedimentation Rate  -     Vitamin B12  -     Vitamin D,25-Hydroxy  -     Folate    7. Primary osteoarthritis involving multiple joints    8. Degeneration of intervertebral disc of lumbar region with discogenic back pain and lower extremity pain    9. Mild cognitive impairment  -     Urinalysis With Microscopic If Indicated (No Culture) - Urine, Clean Catch  -     TSH  -     T4, Free  -     T3, Free  -     Lipid Panel With / Chol / HDL Ratio  -     CBC & Differential  -     Comprehensive Metabolic Panel  -     Hemoglobin A1c  -     Sedimentation Rate  -     Vitamin B12  -     Vitamin D,25-Hydroxy  -     Folate    10. B12 deficiency  -     Vitamin B12    11. Hypervitaminosis D  -     Vitamin D,25-Hydroxy    12. Elevated glucose  -     Hemoglobin A1c      Assessment & Plan  1. Atrial Fibrillation.  - Currently on atenolol for rate control and Eliquis 5 mg twice daily as a blood thinner.  - Prevalence of atrial fibrillation in women aged 80-90 was discussed, along with risk factors such as hypertension, coronary artery disease, and obesity.  - Advised to continue current medication regimen. Moderate consumption of tea, coffee, and alcohol deemed acceptable.  - Encouraged to engage in regular exercise and to avoid driving during peak traffic hours.    2. Hypertension.  - Blood pressure reading today was 128.  - Currently on indapamide and atenolol, which also helps control heart rate.  - Advised to continue current medication regimen.    3. Hypercholesterolemia.  - Expressed concerns about cholesterol levels and their potential impact on leg discoloration.  - Blood work will be conducted today to assess cholesterol levels.    4. Vitamin D Deficiency.  -  Mentioned a previous diagnosis of low vitamin D levels.  - Blood work will be conducted today to reassess vitamin D levels.    5. Cystic Lesion.  - Referral to a dermatologist will be made for further evaluation and management of the cystic lesion located laterally to the mouth.    Follow-up  - Follow up in 3 to 4 months.         Follow Up   No follow-ups on file.    Patient or patient representative verbalized consent for the use of Ambient Listening during the visit with  Jewel Arteaga MD for chart documentation. 4/15/2025  15:27 EDT    Patient was given instructions and counseling regarding her condition or for health maintenance advice. Please see specific information pulled into the AVS if appropriate.

## 2025-04-16 LAB
25(OH)D3+25(OH)D2 SERPL-MCNC: 49.4 NG/ML (ref 30–100)
ALBUMIN SERPL-MCNC: 3.6 G/DL (ref 3.5–5.2)
ALBUMIN/GLOB SERPL: 1.3 G/DL
ALP SERPL-CCNC: 106 U/L (ref 39–117)
ALT SERPL-CCNC: 14 U/L (ref 1–33)
APPEARANCE UR: CLEAR
AST SERPL-CCNC: 28 U/L (ref 1–32)
BASOPHILS # BLD AUTO: 0.04 10*3/MM3 (ref 0–0.2)
BASOPHILS NFR BLD AUTO: 0.7 % (ref 0–1.5)
BILIRUB SERPL-MCNC: 0.9 MG/DL (ref 0–1.2)
BILIRUB UR QL STRIP: NEGATIVE
BUN SERPL-MCNC: 32 MG/DL (ref 8–23)
BUN/CREAT SERPL: 32 (ref 7–25)
CALCIUM SERPL-MCNC: 9.8 MG/DL (ref 8.6–10.5)
CHLORIDE SERPL-SCNC: 100 MMOL/L (ref 98–107)
CHOLEST SERPL-MCNC: 150 MG/DL (ref 0–200)
CHOLEST/HDLC SERPL: 2.73 {RATIO}
CO2 SERPL-SCNC: 33.2 MMOL/L (ref 22–29)
COLOR UR: YELLOW
CREAT SERPL-MCNC: 1 MG/DL (ref 0.57–1)
EGFRCR SERPLBLD CKD-EPI 2021: 55.7 ML/MIN/1.73
EOSINOPHIL # BLD AUTO: 0.14 10*3/MM3 (ref 0–0.4)
EOSINOPHIL NFR BLD AUTO: 2.4 % (ref 0.3–6.2)
ERYTHROCYTE [DISTWIDTH] IN BLOOD BY AUTOMATED COUNT: 13 % (ref 12.3–15.4)
ERYTHROCYTE [SEDIMENTATION RATE] IN BLOOD BY WESTERGREN METHOD: <1 MM/HR (ref 0–30)
FOLATE SERPL-MCNC: >20 NG/ML (ref 4.78–24.2)
GLOBULIN SER CALC-MCNC: 2.8 GM/DL
GLUCOSE SERPL-MCNC: 57 MG/DL (ref 65–99)
GLUCOSE UR QL STRIP: NEGATIVE
HBA1C MFR BLD: 5.4 % (ref 4.8–5.6)
HCT VFR BLD AUTO: 53.2 % (ref 34–46.6)
HDLC SERPL-MCNC: 55 MG/DL (ref 40–60)
HGB BLD-MCNC: 17.1 G/DL (ref 12–15.9)
HGB UR QL STRIP: NEGATIVE
IMM GRANULOCYTES # BLD AUTO: 0.01 10*3/MM3 (ref 0–0.05)
IMM GRANULOCYTES NFR BLD AUTO: 0.2 % (ref 0–0.5)
KETONES UR QL STRIP: NEGATIVE
LDLC SERPL CALC-MCNC: 83 MG/DL (ref 0–100)
LEUKOCYTE ESTERASE UR QL STRIP: NEGATIVE
LYMPHOCYTES # BLD AUTO: 2.36 10*3/MM3 (ref 0.7–3.1)
LYMPHOCYTES NFR BLD AUTO: 40.1 % (ref 19.6–45.3)
MCH RBC QN AUTO: 28.6 PG (ref 26.6–33)
MCHC RBC AUTO-ENTMCNC: 32.1 G/DL (ref 31.5–35.7)
MCV RBC AUTO: 89 FL (ref 79–97)
MONOCYTES # BLD AUTO: 0.48 10*3/MM3 (ref 0.1–0.9)
MONOCYTES NFR BLD AUTO: 8.1 % (ref 5–12)
NEUTROPHILS # BLD AUTO: 2.86 10*3/MM3 (ref 1.7–7)
NEUTROPHILS NFR BLD AUTO: 48.5 % (ref 42.7–76)
NITRITE UR QL STRIP: NEGATIVE
NRBC BLD AUTO-RTO: 0 /100 WBC (ref 0–0.2)
PH UR STRIP: 6.5 [PH] (ref 5–8)
PLATELET # BLD AUTO: 141 10*3/MM3 (ref 140–450)
POTASSIUM SERPL-SCNC: 3.8 MMOL/L (ref 3.5–5.2)
PROT SERPL-MCNC: 6.4 G/DL (ref 6–8.5)
PROT UR QL STRIP: NEGATIVE
RBC # BLD AUTO: 5.98 10*6/MM3 (ref 3.77–5.28)
SODIUM SERPL-SCNC: 142 MMOL/L (ref 136–145)
SP GR UR STRIP: 1.02 (ref 1–1.03)
T3FREE SERPL-MCNC: 2.7 PG/ML (ref 2–4.4)
T4 FREE SERPL-MCNC: 1.12 NG/DL (ref 0.92–1.68)
TRIGL SERPL-MCNC: 61 MG/DL (ref 0–150)
TSH SERPL DL<=0.005 MIU/L-ACNC: 0.68 UIU/ML (ref 0.27–4.2)
UROBILINOGEN UR STRIP-MCNC: NORMAL MG/DL
VIT B12 SERPL-MCNC: 577 PG/ML (ref 211–946)
VLDLC SERPL CALC-MCNC: 12 MG/DL (ref 5–40)
WBC # BLD AUTO: 5.89 10*3/MM3 (ref 3.4–10.8)

## 2025-04-17 RX ORDER — FLUOCINOLONE ACETONIDE 0.1 MG/ML
SOLUTION TOPICAL 2 TIMES DAILY
Qty: 60 ML | Refills: 1 | Status: SHIPPED | OUTPATIENT
Start: 2025-04-17 | End: 2025-04-18 | Stop reason: SDUPTHER

## 2025-04-18 RX ORDER — FLUOCINOLONE ACETONIDE 0.1 MG/ML
SOLUTION TOPICAL 2 TIMES DAILY
Qty: 60 ML | Refills: 1 | Status: SHIPPED | OUTPATIENT
Start: 2025-04-18

## 2025-04-18 RX ORDER — MINOCYCLINE HYDROCHLORIDE 100 MG/1
100 TABLET ORAL DAILY
Qty: 30 TABLET | Refills: 2 | Status: SHIPPED | OUTPATIENT
Start: 2025-04-18

## 2025-04-18 RX ORDER — INDAPAMIDE 2.5 MG/1
2.5 TABLET ORAL EVERY MORNING
Qty: 90 TABLET | Refills: 1 | Status: SHIPPED | OUTPATIENT
Start: 2025-04-18

## 2025-04-18 NOTE — TELEPHONE ENCOUNTER
Caller: Jaquan Robledo Vikki Jessica    Relationship: Self    Best call back number: [phone]     Requested Prescriptions:   Requested Prescriptions     Pending Prescriptions Disp Refills    indapamide (LOZOL) 2.5 MG tablet 90 tablet 1     Sig: Take 1 tablet by mouth Every Morning. TO PREVENT SWELLING    minocycline (DYNACIN) 100 MG tablet 30 tablet 2     Sig: Take 1 tablet by mouth Daily.        Pharmacy where request should be sent: [facility] [address] [phone] [facility] [phone]      Last office visit with prescribing clinician: 4/15/2025   Last telemedicine visit with prescribing clinician: Visit date not found   Next office visit with prescribing clinician: 7/10/2025     Would you like a call back once the refill request has been completed: [] Yes [x] No    If the office needs to give you a call back, can they leave a voicemail: [] Yes [x] No    Rd Batista Rep   04/18/25 15:33 EDT

## 2025-04-18 NOTE — TELEPHONE ENCOUNTER
Rx Refill Note  Requested Prescriptions     Pending Prescriptions Disp Refills    minocycline (DYNACIN) 100 MG tablet 30 tablet 2     Sig: Take 1 tablet by mouth Daily.     Signed Prescriptions Disp Refills    indapamide (LOZOL) 2.5 MG tablet 90 tablet 1     Sig: Take 1 tablet by mouth Every Morning. TO PREVENT SWELLING     Authorizing Provider: COLEMAN MISTRY     Ordering User: LESLIE CHOPRA      Last office visit with prescribing clinician: 4/15/2025   Last telemedicine visit with prescribing clinician: Visit date not found   Next office visit with prescribing clinician: 7/10/2025       Leslie Chopra MA  04/18/25, 16:06 EDT

## 2025-04-23 ENCOUNTER — TELEPHONE (OUTPATIENT)
Dept: INTERNAL MEDICINE | Facility: CLINIC | Age: 85
End: 2025-04-23
Payer: MEDICARE

## 2025-04-23 NOTE — TELEPHONE ENCOUNTER
Caller: Vikki Dye    Relationship: Self    Best call back number: [phone]       Who are you requesting to speak with (clinical staff, provider,  specific staff member): DR MISTRY       What was the call regarding: PATIENT STATES SHE RECEIVED fluocinolone (SYNALAR) 0.01 % external solution FROM THE PHARMACY     PATIENT STATES SHE DOES NOT KNOW WHAT THIS DRUG IS FOR OR WHAT SHE NEEDS TO APPLY IT TO    PATIENT STATES IT WAS PRESCRIBED BY DR MISTRY AND IT WAS NOT DISCUSSED     Is it okay if the provider responds through MyChart: NO

## 2025-04-24 NOTE — TELEPHONE ENCOUNTER
"Hub to relay    \"This is a solution to be used on her scalp, we received a refill request and it was approved.  This is not anything new he added, she has had this in the past it look like it was originally filled on 3/29/21.   If she does not want it she does not have to pick it up and request the pharmacy take it off her profile.  I tried to call the pt but someone picked up her line and did not say anything\"  "

## 2025-05-09 ENCOUNTER — TELEPHONE (OUTPATIENT)
Dept: INTERNAL MEDICINE | Facility: CLINIC | Age: 85
End: 2025-05-09

## 2025-05-09 NOTE — TELEPHONE ENCOUNTER
Caller: Vikki Dye    Relationship: Self    Best call back number: [phone]     What test was performed: LABS    When was the test performed: 4/15/25    Where was the test performed: IN OFFICE LABS    Additional notes: PATIENT REQUESTS A TO HAVE A COPY OF HER LAB RESULTS MAILED TO HER HOME ADDRESS.    [address] [facility] [address]

## 2025-05-12 ENCOUNTER — RESULTS FOLLOW-UP (OUTPATIENT)
Dept: INTERNAL MEDICINE | Facility: CLINIC | Age: 85
End: 2025-05-12
Payer: MEDICARE

## 2025-05-14 NOTE — TELEPHONE ENCOUNTER
Hub staff attempted to follow warm transfer process and was unsuccessful     Caller: Vkiki Dye    Relationship to patient: Self    Best call back number: [phone]     Patient : PATIENT STATES SHE HAS NOT RECEIVED HER LAB RESULTS IN THE MAIL OR A CALL REGARDING HER LAB RESULTS AND ITS BEEN OVER A MONTH

## 2025-05-22 ENCOUNTER — TELEPHONE (OUTPATIENT)
Dept: INTERNAL MEDICINE | Facility: CLINIC | Age: 85
End: 2025-05-22
Payer: MEDICARE

## 2025-05-22 NOTE — TELEPHONE ENCOUNTER
I spoke with the pt and went over her results. Pt does not use mychart so I advised her to reach out to the help desk to deactivate her mychart.  I will mail her result letter to her as well.

## 2025-05-22 NOTE — TELEPHONE ENCOUNTER
Caller: Vikki Dye    Relationship: Self    Best call back number: [phone]     What is the best time to reach you: ANYTIME AFTER 1:00 PM    Who are you requesting to speak with (clinical staff, provider,  specific staff member): DR FIDELIA FAUSTIN    What was the call regarding: PATIENT DOES NOT UNDERSTAND LAB RESULTS AND WOULD LIKE A PHONE CALL TO DISCUSS THEM.    Is it okay if the provider responds through MyChart: NO SHE PREFERS PHONE CALL

## 2025-05-23 ENCOUNTER — TELEPHONE (OUTPATIENT)
Dept: INTERNAL MEDICINE | Facility: CLINIC | Age: 85
End: 2025-05-23
Payer: MEDICARE

## 2025-05-23 NOTE — TELEPHONE ENCOUNTER
Caller: Vikki Dye    Relationship: Self    Best call back number:     What is the best time to reach you:     Who are you requesting to speak with (clinical staff, provider,  specific staff member):     Do you know the name of the person who called:     What was the call regarding: PATIENT SAYS SHE GOT SOME LAB RESULTS IN THE MAIL AND WAS TOLD TO TAKE A VITAMIN D SUPPLEMENT. PATIENT SAYS SHE WENT TO GET THE SUPPLEMENTS FROM THE DRUGSTORE A COUPLE OF DAYS AGO AND ITS NOTED THAT IF SHE TAKES OTHER MEDICATION TO CONSULT WITH HER PHYSICIAN.  SHE SAYS SHE DOESN'T UNDERSTAND THE LAB RESULTS SO SHE WANTS TO BE CALLED TO DISCUSS THAT IN DETAIL AND WANTS TO KNOW IF IT IS OK IF SHE TAKES THE VITAMIN D OR NOT.

## 2025-05-23 NOTE — TELEPHONE ENCOUNTER
I spoke with the pt and advised I mailed her a copy of her labs and the only thing noted was she was pretty dehydrated and she needs to drink more water.  Her Vit D level is fine.  I advised her once she gets the lab/letter next week if she has additional questions to reach out to me.

## 2025-05-30 ENCOUNTER — TELEPHONE (OUTPATIENT)
Dept: INTERNAL MEDICINE | Facility: CLINIC | Age: 85
End: 2025-05-30
Payer: MEDICARE

## 2025-06-02 ENCOUNTER — TELEPHONE (OUTPATIENT)
Dept: INTERNAL MEDICINE | Facility: CLINIC | Age: 85
End: 2025-06-02
Payer: MEDICARE

## 2025-06-02 RX ORDER — EZETIMIBE 10 MG/1
10 TABLET ORAL DAILY
Qty: 90 TABLET | Refills: 1 | Status: CANCELLED | OUTPATIENT
Start: 2025-06-02

## 2025-06-02 RX ORDER — EZETIMIBE 10 MG/1
10 TABLET ORAL DAILY
Qty: 14 TABLET | Refills: 0 | Status: SHIPPED | OUTPATIENT
Start: 2025-06-02

## 2025-06-02 RX ORDER — FLUOCINOLONE ACETONIDE 0.1 MG/ML
SOLUTION TOPICAL 2 TIMES DAILY
Qty: 60 ML | Refills: 1 | Status: SHIPPED | OUTPATIENT
Start: 2025-06-02

## 2025-06-02 NOTE — TELEPHONE ENCOUNTER
Caller: Jaquan Robledo Vikki Jessica    Relationship: Self    Best call back number: [phone]    Requested Prescriptions:   Requested Prescriptions     Pending Prescriptions Disp Refills    ezetimibe (ZETIA) 10 MG tablet 90 tablet 1     Sig: Take 1 tablet by mouth Daily.        Pharmacy where request should be sent: [id number] [place] [id number] [facility] [phone] [facility] [phone]      Last office visit with prescribing clinician: 4/15/2025   Last telemedicine visit with prescribing clinician: Visit date not found   Next office visit with prescribing clinician: 7/10/2025     Additional details provided by patient: PATIENT NEEDS A 14 DAY SUPPLY FROM LOCAL PHARMACY     [facility] WILL SEND THE REST OF THE SUPPLY     PRESCRIPTION # [id number]        Would you like a call back once the refill request has been completed: [] Yes [x] No    If the office needs to give you a call back, can they leave a voicemail: [] Yes [x] No    Rd Breaux Rep   06/02/25 13:37 EDT

## 2025-06-06 RX ORDER — MINOCYCLINE HYDROCHLORIDE 100 MG/1
100 TABLET ORAL DAILY
Qty: 30 TABLET | Refills: 2 | Status: SHIPPED | OUTPATIENT
Start: 2025-06-06 | End: 2025-06-09 | Stop reason: SDUPTHER

## 2025-06-06 NOTE — TELEPHONE ENCOUNTER
Caller: Jaquan Robledo Amy    Relationship: Self    Best call back number: [phone]     Requested Prescriptions:   Requested Prescriptions     Pending Prescriptions Disp Refills    minocycline (DYNACIN) 100 MG tablet 30 tablet 2     Sig: Take 1 tablet by mouth Daily.        Pharmacy where request should be sent: [facility] [address] [phone] [facility] [phone]      Last office visit with prescribing clinician: 4/15/2025   Last telemedicine visit with prescribing clinician: Visit date not found   Next office visit with prescribing clinician: 7/10/2025       Would you like a call back once the refill request has been completed: [] Yes [x] No    If the office needs to give you a call back, can they leave a voicemail: [] Yes [x] No    Rd Batista   06/06/25 13:30 EDT

## 2025-06-09 RX ORDER — MINOCYCLINE HYDROCHLORIDE 100 MG/1
100 TABLET ORAL DAILY
Qty: 60 TABLET | Refills: 1 | Status: SHIPPED | OUTPATIENT
Start: 2025-06-09

## 2025-06-09 NOTE — TELEPHONE ENCOUNTER
Caller: Vikki Dye    Relationship: Self    Best call back number:     Requested Prescriptions:   Requested Prescriptions     Pending Prescriptions Disp Refills    minocycline (DYNACIN) 100 MG tablet 30 tablet 2     Sig: Take 1 tablet by mouth Daily.        Pharmacy where request should be sent: [facility] - [address] [phone] [facility] [phone]      Last office visit with prescribing clinician: 4/15/2025   Last telemedicine visit with prescribing clinician: Visit date not found   Next office visit with prescribing clinician: 7/10/2025     Additional details provided by patient: PATIENT SAYS THAT THIS NEEDS TO BE FOR A 60 DAY SUPPLY INSTEAD OF A 30 DAY SUPPLY AS THE MAIL ORDER CANNOT PRESCRIBE A 30 DAY SUPPLY    Does the patient have less than a 3 day supply:  [x] Yes  [] No      Rd Peralta Rep   06/09/25 14:30 EDT

## 2025-06-27 ENCOUNTER — TELEPHONE (OUTPATIENT)
Dept: INTERNAL MEDICINE | Facility: CLINIC | Age: 85
End: 2025-06-27
Payer: MEDICARE

## 2025-06-27 ENCOUNTER — APPOINTMENT (OUTPATIENT)
Dept: GENERAL RADIOLOGY | Facility: HOSPITAL | Age: 85
End: 2025-06-27
Payer: MEDICARE

## 2025-06-27 ENCOUNTER — HOSPITAL ENCOUNTER (EMERGENCY)
Facility: HOSPITAL | Age: 85
Discharge: HOME | End: 2025-06-27
Attending: STUDENT IN AN ORGANIZED HEALTH CARE EDUCATION/TRAINING PROGRAM
Payer: MEDICARE

## 2025-06-27 ENCOUNTER — APPOINTMENT (OUTPATIENT)
Dept: CT IMAGING | Facility: HOSPITAL | Age: 85
End: 2025-06-27
Payer: MEDICARE

## 2025-06-27 VITALS
HEART RATE: 76 BPM | OXYGEN SATURATION: 100 % | DIASTOLIC BLOOD PRESSURE: 77 MMHG | TEMPERATURE: 97.6 F | SYSTOLIC BLOOD PRESSURE: 101 MMHG | RESPIRATION RATE: 16 BRPM

## 2025-06-27 DIAGNOSIS — T14.8XXA HEMATOMA: Primary | ICD-10-CM
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
## 2025-06-27 PROCEDURE — 73070 X-RAY EXAM OF ELBOW: CPT

## 2025-06-27 PROCEDURE — 99284 EMERGENCY DEPT VISIT MOD MDM: CPT

## 2025-06-27 PROCEDURE — 72125 CT NECK SPINE W/O DYE: CPT

## 2025-06-27 PROCEDURE — 70450 CT HEAD/BRAIN W/O DYE: CPT

## 2025-06-27 NOTE — DISCHARGE INSTRUCTIONS
Please follow with your primary care physician within 3 to 4 days for repeat evaluation    I have placed an outpatient referral to wound care, they should contact you to schedule an appointment for follow-up of the hematoma in your right arm    Please return to the ER with new or worsening symptoms including but not limited to difficulty with ambulation, changes in mental status, chest pain, shortness of breath, uncontrolled pain

## 2025-06-27 NOTE — TELEPHONE ENCOUNTER
Pt fell day before yesterday and is reporting a 10 inch bruise with a large knot in the middle of it.  Pt takes daily blood thinners so she was advised to go to the ER she may need a scan which we can not do in the office.

## 2025-06-27 NOTE — ED PROVIDER NOTES
EMERGENCY DEPARTMENT ENCOUNTER  Room Number:  34/34  PCP: Jewel Arteaga MD  Independent Historians: Patient      HPI:  Chief Complaint: had concerns including Fall.       Context: Vikki Robledo is a 85 y.o. female with a medical history of HTN, HLD, cognitive impairment, A-fib on Eliquis who presents to the ED c/o acute injury sustained in a fall.  Patient had a fall 2 days ago after overexerting herself.  Patient had on her right side.  Patient does not believe she hit her head however she is on Eliquis.  Patient developed large area of bruising and swelling in the posterior aspect of the right upper extremity which prompted her presentation today.  Patient has been ambulatory since the fall and usually uses a walker to get around.    Review of prior external notes (non-ED) -and- Review of prior external test results outside of this encounter: Office visit with vascular surgery from 4/10/2025 reviewed and notable for presentation secondary to varicose veins of the bilateral lower extremities plan was to monitor and consider intervention if symptoms worsen.    Prescription drug monitoring program review:         PAST MEDICAL HISTORY  Active Ambulatory Problems     Diagnosis Date Noted    Degeneration of intervertebral disc of lumbar region 03/24/2016    Hyperlipidemia 03/24/2016    Hypertension 03/24/2016    Stenosis of carotid artery 03/24/2016    Carotid stenosis 09/23/2016    Muscle spasm 09/23/2016    Transient insomnia 09/23/2016    Psoriatic arthritis 07/10/2018    Psoriasis 07/10/2018    Ludy's node 07/10/2018    Medicare annual wellness visit, subsequent 07/10/2018    Vitamin D deficiency 02/28/2020    Hammer toe of left foot 02/28/2020    Balance disorder 02/28/2020    PVC's (premature ventricular contractions) 02/28/2020    Mild cognitive impairment 06/29/2020    History of left hip replacement 04/05/2023    History of colonic polyps 04/01/2014    Fracture of nasal bone 07/27/2018    Benign  essential HTN 07/03/2023    Back pain 02/11/2016    APC (atrial premature contractions) 01/09/2023    Spastic dysuria 07/03/2023    Dystrophic nail 07/03/2023    Toe deformity, acquired, left 08/25/2023    Atrial fibrillation, persistent 10/05/2023    Varicose veins of bilateral lower extremities with other complications 09/11/2018    Venous stasis 07/24/2024    Burn, forearm, first degree, left, initial encounter 12/05/2024    Cellulitis of left lower extremity 12/05/2024    Capillaritis 12/05/2024    Statin intolerance 01/14/2025    Venous (peripheral) insufficiency 02/13/2025    Chronic venous hypertension with inflammation involving both sides 02/13/2025     Resolved Ambulatory Problems     Diagnosis Date Noted    Primary osteoarthritis involving multiple joints 07/14/2022    Paroxysmal atrial fibrillation 08/10/2023     Past Medical History:   Diagnosis Date    Arthritis     Carotid artery stenosis 05/13/2021    Cataracts, bilateral     Cramp and spasm 07/11/2022    Essential (primary) hypertension     Fibrocystic breast     History of breast surgery     Memory loss     Pain in leg, unspecified     Spider veins 09/10/2018    Spinal stenosis, lumbosacral region 09/10/2018         PAST SURGICAL HISTORY  Past Surgical History:   Procedure Laterality Date    BREAST BIOPSY Bilateral     5 bx total ? how many on each breast last one in 80's  all were benign    BREAST SURGERY      aspriaton of cyst x3    CATARACT EXTRACTION, BILATERAL      CHOLECYSTECTOMY  2007    COLONOSCOPY  2011    CYST REMOVAL      BREAST IN 1963 AND 1970    HERNIA REPAIR      x2    TONSILLECTOMY      UTERINE FIBROID EMBOLIZATION  1970         FAMILY HISTORY  Family History   Problem Relation Age of Onset    Alzheimer's disease Mother     Pancreatic cancer Father     Heart disease Father     Alzheimer's disease Maternal Aunt     Alzheimer's disease Maternal Uncle     Alzheimer's disease Paternal Aunt     Alzheimer's disease Other     Pancreatic  cancer Other          SOCIAL HISTORY  Social History     Socioeconomic History    Marital status:    Tobacco Use    Smoking status: Former     Current packs/day: 0.00     Average packs/day: 0.5 packs/day for 37.0 years (18.5 ttl pk-yrs)     Types: Cigarettes     Start date:      Quit date:      Years since quittin.5     Passive exposure: Past    Smokeless tobacco: Never   Vaping Use    Vaping status: Never Used   Substance and Sexual Activity    Alcohol use: Yes     Comment: One or more times monthly     Drug use: Never    Sexual activity: Defer       Chronic or social conditions impacting patient care (Social Determinants of Health):  Social Drivers of Health     Tobacco Use: Medium Risk (4/15/2025)    Patient History     Smoking Tobacco Use: Former     Smokeless Tobacco Use: Never     Passive Exposure: Past   Alcohol Use: Not At Risk (2023)    AUDIT-C     Frequency of Alcohol Consumption: Never     Average Number of Drinks: Patient does not drink     Frequency of Binge Drinking: Never   Financial Resource Strain: Not on file   Food Insecurity: Not on file   Transportation Needs: Not on file   Physical Activity: Not on file   Stress: Not on file   Social Connections: Not on file   Interpersonal Safety: Not At Risk (2025)    Abuse Screen     Unsafe at Home or Work/School: no     Feels Threatened by Someone?: no     Does Anyone Keep You from Contacting Others or Doint Things Outside the Home?: no     Physical Sign of Abuse Present: no   Depression: Not at risk (2025)    PHQ-2     PHQ-2 Score: 0   Housing Stability: Unknown (2023)    Housing Stability     Current Living Arrangements: home     Potentially Unsafe Housing Conditions: Not on file   Utilities: Not on file   Health Literacy: Not on file   Employment: Not on file   Disabilities: Not At Risk (2023)    Disabilities     Concentrating, Remembering, or Making Decisions Difficulty: no     Doing Errands Independently  Difficulty: no       ALLERGIES  Amoxicillin, Penicillins, and Statins      REVIEW OF SYSTEMS  Review of Systems  Included in HPI  All systems reviewed and negative except for those discussed in HPI.      PHYSICAL EXAM    I have reviewed the triage vital signs and nursing notes.    ED Triage Vitals   Temp Heart Rate Resp BP SpO2   06/27/25 1352 06/27/25 1352 06/27/25 1352 06/27/25 1400 06/27/25 1352   97.6 °F (36.4 °C) 91 16 110/83 98 %      Temp src Heart Rate Source Patient Position BP Location FiO2 (%)   06/27/25 1352 06/27/25 1352 06/27/25 1400 06/27/25 1400 --   Oral Monitor Sitting Left arm        Physical Exam  GENERAL: alert, no acute distress  SKIN: Warm, dry  HENT: Normocephalic, atraumatic  EYES: no scleral icterus  CV: regular rhythm, regular rate  RESPIRATORY: normal effort, lungs clear  ABDOMEN: soft, nontender, nondistended  MUSCULOSKELETAL: no deformity large hematoma noted to the posterior aspect of the right upper extremity just posterior to the triceps with moderate tenderness to palpation.  Palpated from head to toe with no areas of bony tenderness identified.  NEURO: alert, moves all extremities, follows commands            LAB RESULTS  No results found for this or any previous visit (from the past 24 hours).      RADIOLOGY  CT Head Without Contrast  CT Head Without Contrast, CT Cervical Spine Without Contrast  Result Date: 6/27/2025  CT HEAD WO CONTRAST-, CT CERVICAL SPINE WO CONTRAST-  INDICATIONS: Trauma  TECHNIQUE: Radiation dose reduction techniques were utilized, including automated exposure control and exposure modulation based on body size. Noncontrast head CT, cervical spine CT  COMPARISON: None available  FINDINGS:  Head CT:  No acute intracranial hemorrhage, midline shift or mass effect. No acute territorial infarct is identified.  Mild periventricular hypodensities suggest chronic small vessel ischemic change in a patient this age.    Ventricles, cisterns, cerebral sulci are  unremarkable for patient age.  The visualized paranasal sinuses, orbits, mastoid air cells are unremarkable.   Cervical spine CT:  No acute fracture is identified. Alignment is range of normal.  There is an uncovertebral joint hypertrophy contribute to multilevel bilateral neuroforaminal narrowing. There appears to be at least mild central stenosis at C5/6, C6/7 related to disc osteophyte complex.  Bilateral carotid arterial calcifications are present.  Thyroid nodularity is apparent, but suboptimally evaluated with this technique, thyroid ultrasound correlation recommended as indicated.  On axial image 96, series 5, a 4 mm subpleural nodule of the superior segment of the left lower lobe is indeterminate, recommend 3-month follow-up chest CT to characterize change.         1. No acute intracranial hemorrhage or hydrocephalus. No acute cervical fracture identified; degenerative changes in the cervical spine. If there is further clinical concern, MRI could be considered for further evaluation.  2. Incidental findings as noted above.  This report was finalized on 6/27/2025 5:03 PM by Dr. Rojelio Wilkinson M.D on Workstation: [id number]      XR Elbow 2 View Right  Result Date: 6/27/2025  XR ELBOW 2 VW RIGHT-  DATE OF EXAM: 6/27/2025 3:03 PM  INDICATION: Fall, large right elbow hematoma.  COMPARISON: None available.  TECHNIQUE: 3 views of the elbow were obtained.  FINDINGS: Oblique positioning on the AP view. Diffuse osteopenia. No evidence of acute fracture or dislocation. Mild DJD at the ulnotrochlear compartment. No significant joint effusion. Patchy soft tissue swelling at the posterior distal upper arm.       1. Patchy soft tissue swelling at the posterior distal upper arm and diffuse osteopenia, without radiographic evidence of acute fracture or dislocation. 2. Mild DJD at the ulnotrochlear compartment.  This report was finalized on 6/27/2025 3:47 PM by Torin Luis MD on Workstation: [id number]           MEDICATIONS GIVEN IN ER  Medications - No data to display      ORDERS PLACED DURING THIS VISIT:  Orders Placed This Encounter   Procedures    CT Head Without Contrast    CT Cervical Spine Without Contrast    XR Elbow 2 View Right    Ambulatory Referral to Wound Clinic         OUTPATIENT MEDICATION MANAGEMENT:  No current Epic-ordered facility-administered medications on file.     Current Outpatient Medications Ordered in Epic   Medication Sig Dispense Refill    apixaban (Eliquis) 5 MG tablet tablet Take 1 tablet by mouth Every 12 (Twelve) Hours. 60 tablet 11    atenolol (TENORMIN) 25 MG tablet Take 1 tablet by mouth Every 12 (Twelve) Hours. 180 tablet 3    ciclopirox (LOPROX) 1 % shampoo Shampoo into scalp and let sit on for 5 minutes 3 times per week.  0    ezetimibe (ZETIA) 10 MG tablet Take 1 tablet by mouth Daily. 14 tablet 0    fexofenadine (Allegra Allergy) 60 MG tablet Take 1 tablet by mouth Daily. For allergies and nose 90 tablet 3    fluocinolone (SYNALAR) 0.01 % external solution APPLY TOPICALLY TO THE AFFECTED AREA TWICE DAILY AS DIRECTED 60 mL 1    indapamide (LOZOL) 2.5 MG tablet Take 1 tablet by mouth Every Morning. TO PREVENT SWELLING 90 tablet 1    minocycline (DYNACIN) 100 MG tablet Take 1 tablet by mouth Daily. 60 tablet 1         PROCEDURES  Procedures            PROGRESS, DATA ANALYSIS, CONSULTS, AND MEDICAL DECISION MAKING  All labs have been independently interpreted by me.  All radiology studies have been reviewed by me. All EKG's have been independently viewed and interpreted by me.  Discussion below represents my analysis of pertinent findings related to patient's condition, differential diagnosis, treatment plan and final disposition.    Differential diagnosis includes but is not limited to ICH, muscle strain, head injury, hematoma, elbow fracture.    Clinical Scores:                                       ED Course as of 06/27/25 1707   Fri Jun 27, 2025   1512 Elbow x-ray  interpreted by me demonstrates no evidence of fracture or malalignment [MW]   1706 Workup in the emergency department is overall unremarkable.  Patient does have a large hematoma in the posterior aspect of the right arm.  Will place referral to wound care for appropriate follow-up and management of the hematoma.  CT of head and neck are negative for acute.  Patient is ambulatory without assistance.  Will discharge to follow-up on outpatient basis.  Patient is agreeable. [MW]      ED Course User Index  [MW] Tad Rangel MD             AS OF 17:07 EDT VITALS:    BP - 123/67  HR - 70  TEMP - 97.6 °F (36.4 °C) (Oral)  O2 SATS - 100%    COMPLEXITY OF CARE  Admission was considered but after careful review of the patient's presentation, physical examination, diagnostic results, and response to treatment the patient may be safely discharged with outpatient follow-up.      DIAGNOSIS  Final diagnoses:   Hematoma   Fall, initial encounter         DISPOSITION  ED Disposition       ED Disposition   Discharge    Condition   Stable    Comment   --                Please note that portions of this document were completed with a voice recognition program.    Note Disclaimer: At [facility], we believe that sharing information builds trust and better relationships. You are receiving this note because you recently visited [facility]. It is possible you will see health information before a provider has talked with you about it. This kind of information can be easy to misunderstand. To help you fully understand what it means for your health, we urge you to discuss this note with your provider.         Tad Rangel MD  06/27/25 [id number]

## 2025-07-10 ENCOUNTER — OFFICE VISIT (OUTPATIENT)
Dept: INTERNAL MEDICINE | Facility: CLINIC | Age: 85
End: 2025-07-10
Payer: MEDICARE

## 2025-07-10 VITALS
SYSTOLIC BLOOD PRESSURE: 128 MMHG | WEIGHT: 142 LBS | DIASTOLIC BLOOD PRESSURE: 64 MMHG | OXYGEN SATURATION: 97 % | HEART RATE: 78 BPM | BODY MASS INDEX: 25.96 KG/M2

## 2025-07-10 DIAGNOSIS — I10 PRIMARY HYPERTENSION: ICD-10-CM

## 2025-07-10 DIAGNOSIS — I48.19 ATRIAL FIBRILLATION, PERSISTENT: ICD-10-CM

## 2025-07-10 DIAGNOSIS — E78.2 MIXED HYPERLIPIDEMIA: ICD-10-CM

## 2025-07-10 DIAGNOSIS — G31.84 MILD COGNITIVE IMPAIRMENT: Primary | ICD-10-CM

## 2025-07-10 DIAGNOSIS — I87.2 VENOUS STASIS DERMATITIS OF BOTH LOWER EXTREMITIES: ICD-10-CM

## 2025-07-10 DIAGNOSIS — S40.021S: ICD-10-CM

## 2025-07-10 PROBLEM — S40.021A: Status: ACTIVE | Noted: 2025-07-10

## 2025-07-10 RX ORDER — MEMANTINE HYDROCHLORIDE 7 MG/1
7 CAPSULE, EXTENDED RELEASE ORAL DAILY
Qty: 90 CAPSULE | Refills: 1 | Status: SHIPPED | OUTPATIENT
Start: 2025-07-10

## 2025-07-10 NOTE — PROGRESS NOTES
Chief Complaint  Hypertension, Hyperlipidemia, Atrial Fibrillation, and Osteoarthritis    Subjective        Vikki Robledo presents to [facility]  History of Present Illness  History of Present Illness  The patient presents for evaluation of memory issues, depression, and bruising on her right arm.    She recently experienced a fall in her home while organizing a large storage room, a task she had been undertaking for several months. On the day of the fall, she was particularly fatigued due to overexertion and did not have her cane or any other support at hand, which led to her losing balance and falling. She had to crawl on the floor as she did not have a phone with her. She sustained multiple bruises on her arm during the fall. X-rays of her head and neck were taken, which showed no abnormalities.    She reports feeling slightly depressed and anxious, often worrying excessively about minor issues. She expresses concern about potential memory problems, attributing them to the stress of her current activities. She also mentions a recent family bereavement, which has added to her emotional burden. She feels guilty for not reaching out to her godson following the death of his stepdaughter. She is reluctant to seek treatment for her depression or anxiety, as she believes she can manage these issues independently.    She has noticed some changes in her legs, including darkening of the skin. She is curious about the cause of these changes and whether they could be related to high cholesterol levels. She has been using support stockings to manage the condition.    She has a dystrophic irregular big toenail on her left foot. She is unsure if anything can be done besides an operation, which she does not want. It does not irritate her that much. Her second toe locks over the big toe. She went to a doctor 6 or 7 years ago, who said it was a very hard thing to do and did not think she  "should do it. She used to go to a lady who cuts nails, and she could take off some of that. Sometimes it gets snagged in something.    FAMILY HISTORY  Her mother had Alzheimer's disease.    Objective   Vital Signs:  /64 (BP Location: Left arm, Patient Position: Sitting, Cuff Size: Adult)   Pulse 78   Wt 64.4 kg (142 lb)   SpO2 97%   BMI 25.96 kg/m²   Estimated body mass index is 25.96 kg/m² as calculated from the following:    Height as of 4/10/25: 157.5 cm (62.01\").    Weight as of this encounter: 64.4 kg (142 lb).            Physical Exam  Vitals reviewed.   Constitutional:       Appearance: She is well-developed.   HENT:      Head: Normocephalic and atraumatic.      Right Ear: Tympanic membrane and external ear normal.      Left Ear: Tympanic membrane and external ear normal.      Nose: Nose normal.   Eyes:      Conjunctiva/sclera: Conjunctivae normal.      Pupils: Pupils are equal, round, and reactive to light.   Neck:      Thyroid: No thyromegaly.      Vascular: No JVD.   Cardiovascular:      Rate and Rhythm: Normal rate. Rhythm irregularly irregular.      Heart sounds: Normal heart sounds.   Pulmonary:      Effort: Pulmonary effort is normal.      Breath sounds: Normal breath sounds.   Abdominal:      General: Bowel sounds are normal.      Palpations: Abdomen is soft.   Musculoskeletal:         General: Normal range of motion.      Cervical back: Normal range of motion and neck supple.      Comments: Bruising right posterior upper arm extending down to the hand on the right side   Feet:      Comments: Purplish discoloration extending from the knee to the foot both sides without tenderness.  Left foot with second toe overlapping part of left great toe there is a dystrophic great toenail on the left as well  Lymphadenopathy:      Cervical: No cervical adenopathy.   Skin:     General: Skin is warm and dry.      Findings: No rash.   Neurological:      Mental Status: She is alert and oriented to person, " place, and time.      Cranial Nerves: No cranial nerve deficit.      Coordination: Coordination normal.   Psychiatric:         Behavior: Behavior normal.         Thought Content: Thought content normal.         Judgment: Judgment normal.        Physical Exam  Extremities: Bruising present down the back of the right arm.    Result Review :  The following data was reviewed by: Jewel Arteaga MD on 07/10/2025:  Common labs          4/15/2025    15:50   Common Labs   Glucose 57    BUN 32    Creatinine 1.00    Sodium 142    Potassium 3.8    Chloride 100    Calcium 9.8    Albumin 3.6    Total Bilirubin 0.9    Alkaline Phosphatase 106    AST (SGOT) 28    ALT (SGPT) 14    WBC 5.89    Hemoglobin 17.1    Hematocrit 53.2    Platelets 141    Total Cholesterol 150    Triglycerides 61    HDL Cholesterol 55    LDL Cholesterol  83    Hemoglobin A1C 5.40      Data reviewed : Recent hospitalization notes [facility]   Results  Imaging   - X-ray of the head: Normal   - X-ray of the neck: Normal             Assessment and Plan   Diagnoses and all orders for this visit:    1. Mild cognitive impairment (Primary)  -     memantine (NAMENDA XR) 7 MG capsule sustained-release 24 hr extended release capsule; Take 1 capsule by mouth Daily.  Dispense: 90 capsule; Refill: 1    2. Contusion of multiple sites of right upper extremity, sequela    3. Venous stasis dermatitis of both lower extremities    4. Atrial fibrillation, persistent    5. Mixed hyperlipidemia    6. Primary hypertension      Assessment & Plan  1. Memory issues.  - Reports experiencing some memory problems, potentially related to recent activities and stress.  - No significant memory issues identified during the visit.  - Discussed the option of a memory pill; patient prefers to start with one pill daily.  - Prescription for memory medication provided; follow-up if memory issues worsen.    2. Depression.  - Expressed concerns about feeling depressed and anxious, particularly related  to minor issues and fear of developing Alzheimer's disease.  - Declined medication for depression or anxiety at this time, preferring to manage these issues independently.  - Discussed the potential for depression to masquerade as memory trouble.  - Will monitor mental health and reassess if symptoms persist or worsen.    3. Bruising on right arm.  - Significant bruising on the right arm following a recent fall.  - X-rays of head and neck were normal, indicating no fractures.  - Advised to arrange surroundings to prevent future falls and reduce clutter in the home.  - Monitoring for improvement in bruising and pain.    4. Dystrophic irregular big toenail on left foot.  - Dystrophic irregular big toenail occasionally gets snagged and causes irritation.  - Discussed treatment options, including numbing the toe and removing the nail.  - Patient is not interested in the procedure at this time.  - Will continue to monitor for any changes or increased irritation.    5. Leg discoloration.  - Discoloration likely due to capillary leakage close to the skin surface, possibly exacerbated by age-related loss of subcutaneous fat.  - Advised to continue wearing supportive stockings to manage the condition.  - Monitoring for any progression or worsening of symptoms.    Follow-up  - Follow-up appointment scheduled for 10/2025.         Follow Up   Return in about 3 months (around 10/15/2025) for Recheck.    Patient or patient representative verbalized consent for the use of Ambient Listening during the visit with  Jewel Arteaga MD for chart documentation. 7/10/2025  14:06 EDT    Patient was given instructions and counseling regarding her condition or for health maintenance advice. Please see specific information pulled into the AVS if appropriate.

## 2025-07-11 ENCOUNTER — OFFICE VISIT (OUTPATIENT)
Dept: WOUND CARE | Facility: HOSPITAL | Age: 85
End: 2025-07-11
Payer: MEDICARE

## 2025-07-11 PROCEDURE — G0463 HOSPITAL OUTPT CLINIC VISIT: HCPCS

## 2025-07-30 ENCOUNTER — TELEPHONE (OUTPATIENT)
Dept: INTERNAL MEDICINE | Facility: CLINIC | Age: 85
End: 2025-07-30
Payer: MEDICARE

## 2025-07-30 RX ORDER — CICLOPIROX 1 G/100ML
SHAMPOO TOPICAL
Refills: 0 | Status: CANCELLED | OUTPATIENT
Start: 2025-07-30

## 2025-07-30 NOTE — TELEPHONE ENCOUNTER
Caller: Vikki Dye    Relationship: Self    Best call back number: [phone]     Requested Prescriptions:   Requested Prescriptions     Pending Prescriptions Disp Refills    ciclopirox (LOPROX) 1 % shampoo  0        Pharmacy where request should be sent: [facility] - [address] [phone] [facility] [phone] FX     Last office visit with prescribing clinician: 7/10/2025   Last telemedicine visit with prescribing clinician: Visit date not found   Next office visit with prescribing clinician: 10/14/2025     Additional details provided by patient: PATIENT IS WANTING TO GET A REFILL ON THIS MEDICATION AND IF THEY HAVE A LARGER BOTTLE OF IT SHE WOULD APPRECIATE THAT AS WELL.     PLEASE CALL TO ADVISE.     Does the patient have less than a 3 day supply:  [x] Yes  [] No    Rd Morocho Rep   07/30/25 15:57 EDT

## 2025-07-31 RX ORDER — CICLOPIROX 1 G/100ML
SHAMPOO TOPICAL
Qty: 120 ML | Refills: 2 | Status: SHIPPED | OUTPATIENT
Start: 2025-07-31

## 2025-07-31 NOTE — TELEPHONE ENCOUNTER
Rx Refill Note  Requested Prescriptions     Pending Prescriptions Disp Refills    ciclopirox (LOPROX) 1 % shampoo 120 mL 2     Sig: Shampoo into scalp and let sit on for 5 minutes 3 times per week      Last office visit with prescribing clinician: 7/10/2025   Last telemedicine visit with prescribing clinician: Visit date not found   Next office visit with prescribing clinician: 10/14/2025       Leslie Schwab MA  07/31/25, 11:33 EDT

## 2025-08-01 ENCOUNTER — OFFICE VISIT (OUTPATIENT)
Dept: WOUND CARE | Facility: HOSPITAL | Age: 85
End: 2025-08-01
Payer: MEDICARE

## 2025-08-01 PROCEDURE — G0463 HOSPITAL OUTPT CLINIC VISIT: HCPCS

## 2025-08-11 RX ORDER — FLUOCINOLONE ACETONIDE 0.1 MG/ML
SOLUTION TOPICAL 2 TIMES DAILY
Qty: 60 ML | Refills: 1 | Status: SHIPPED | OUTPATIENT
Start: 2025-08-11

## 2025-08-13 RX ORDER — EZETIMIBE 10 MG/1
10 TABLET ORAL DAILY
Qty: 90 TABLET | Refills: 3 | Status: SHIPPED | OUTPATIENT
Start: 2025-08-13